# Patient Record
Sex: MALE | Race: WHITE | Employment: OTHER | URBAN - METROPOLITAN AREA
[De-identification: names, ages, dates, MRNs, and addresses within clinical notes are randomized per-mention and may not be internally consistent; named-entity substitution may affect disease eponyms.]

---

## 2017-06-27 PROBLEM — Z95.0 CARDIAC PACEMAKER IN SITU: Status: ACTIVE | Noted: 2017-06-27

## 2018-05-08 ENCOUNTER — TELEPHONE (OUTPATIENT)
Dept: CARDIOLOGY CLINIC | Age: 83
End: 2018-05-08

## 2018-05-21 ENCOUNTER — HOSPITAL ENCOUNTER (OUTPATIENT)
Age: 83
Discharge: HOME | End: 2018-05-23

## 2018-05-21 PROCEDURE — 88305 TISSUE EXAM BY PATHOLOGIST: CPT

## 2018-05-24 ENCOUNTER — NURSE ONLY (OUTPATIENT)
Dept: NON INVASIVE DIAGNOSTICS | Age: 83
End: 2018-05-24
Payer: MEDICARE

## 2018-05-24 DIAGNOSIS — Z95.0 CARDIAC PACEMAKER IN SITU: Primary | ICD-10-CM

## 2018-05-24 PROCEDURE — 93296 REM INTERROG EVL PM/IDS: CPT | Performed by: INTERNAL MEDICINE

## 2018-05-24 PROCEDURE — 93294 REM INTERROG EVL PM/LDLS PM: CPT | Performed by: INTERNAL MEDICINE

## 2018-06-01 ENCOUNTER — TELEPHONE (OUTPATIENT)
Dept: CARDIOLOGY CLINIC | Age: 83
End: 2018-06-01

## 2018-08-15 ENCOUNTER — HOSPITAL ENCOUNTER (EMERGENCY)
Age: 83
Discharge: HOME | End: 2018-08-15
Payer: MEDICARE

## 2018-08-15 VITALS
TEMPERATURE: 97.6 F | HEIGHT: 73 IN | SYSTOLIC BLOOD PRESSURE: 128 MMHG | WEIGHT: 195 LBS | DIASTOLIC BLOOD PRESSURE: 71 MMHG | BODY MASS INDEX: 25.84 KG/M2 | OXYGEN SATURATION: 98 % | RESPIRATION RATE: 18 BRPM | HEART RATE: 72 BPM

## 2018-08-15 DIAGNOSIS — L23.7 ALLERGIC CONTACT DERMATITIS DUE TO PLANT: Primary | ICD-10-CM

## 2018-08-15 PROCEDURE — 96372 THER/PROPH/DIAG INJ SC/IM: CPT

## 2018-08-15 PROCEDURE — 6370000000 HC RX 637 (ALT 250 FOR IP): Performed by: NURSE PRACTITIONER

## 2018-08-15 PROCEDURE — 99282 EMERGENCY DEPT VISIT SF MDM: CPT

## 2018-08-15 PROCEDURE — 6360000002 HC RX W HCPCS: Performed by: NURSE PRACTITIONER

## 2018-08-15 RX ORDER — METHYLPREDNISOLONE 4 MG/1
TABLET ORAL
Qty: 1 KIT | Status: SHIPPED | OUTPATIENT
Start: 2018-08-15 | End: 2018-08-21

## 2018-08-15 RX ORDER — LORATADINE 10 MG/1
10 TABLET ORAL DAILY
Qty: 10 TABLET | Refills: 0 | Status: SHIPPED | OUTPATIENT
Start: 2018-08-15 | End: 2018-08-25

## 2018-08-15 RX ORDER — FAMOTIDINE 20 MG/1
20 TABLET, FILM COATED ORAL ONCE
Status: COMPLETED | OUTPATIENT
Start: 2018-08-15 | End: 2018-08-15

## 2018-08-15 RX ORDER — METHYLPREDNISOLONE SODIUM SUCCINATE 125 MG/2ML
125 INJECTION, POWDER, LYOPHILIZED, FOR SOLUTION INTRAMUSCULAR; INTRAVENOUS ONCE
Status: COMPLETED | OUTPATIENT
Start: 2018-08-15 | End: 2018-08-15

## 2018-08-15 RX ADMIN — FAMOTIDINE 20 MG: 20 TABLET ORAL at 17:47

## 2018-08-15 RX ADMIN — METHYLPREDNISOLONE SODIUM SUCCINATE 125 MG: 125 INJECTION, POWDER, FOR SOLUTION INTRAMUSCULAR; INTRAVENOUS at 17:48

## 2018-08-15 NOTE — ED PROVIDER NOTES
shortness of breath, change in voice, perineal itching, metallic taste, feeling of sudden doom, light-headedness, loss of consciousness or additional symptoms. ROS   Pertinent positives and negatives are stated within HPI, all other systems reviewed and are negative. Past Surgical History:  has a past surgical history that includes Fixation Kyphoplasty (07/11/12); ECHO Compl W Dop Color Flow (7/26/2012); Prostate biopsy (9/6/12); fracture surgery (1975); Colonoscopy; ECHO Transesophageal (12/14/2012); ECHO Transesophageal (12/28/2012); transesophageal echocardiogram (12/28/2012); Aortic valve replacement (12-); Cardioversion (12/23/2013); ECHO Transesophageal (12/23/2013); Cardiac catheterization (11/25/2013); A-V cardiac pacemaker insertion (Left, 01/28/2014); joint replacement (Bilateral, 2002 AND 2003); other surgical history (12/18/15); and Varicose vein surgery. Social History:  reports that he quit smoking about 58 years ago. His smoking use included Cigarettes. He has a 0.60 pack-year smoking history. He has never used smokeless tobacco. He reports that he drinks about 3.5 oz of alcohol per week . He reports that he does not use drugs. Family History: family history includes COPD in his mother. Allergies: Patient has no known allergies. Physical Exam           ED Triage Vitals [08/15/18 1730]   BP Temp Temp Source Pulse Resp SpO2 Height Weight   128/71 97.6 °F (36.4 °C) Oral 72 18 98 % 6' 1\" (1.854 m) 195 lb (88.5 kg)      Oxygen Saturation Interpretation: Normal.    General Appearance/Constitutional:  Alert, development consistent with age. HEENT:  NC/NT. PERRLA. Airway patent. Neck:  Normal ROM. Supple. Respiratory:  Clear to auscultation and breath sounds equal.  CV:  Regular rate and rhythm, normal heart sounds, without pathological murmurs, ectopy, gallops, or rubs. GI:  Abdomen Soft, nontender, good bowel sounds. No firm or pulsatile mass.   Back:  No costovertebral tenderness. Extremities: No tenderness or edema noted. Integument:  Normal turgor. Warm, dry, erythematous raised welts noted on right antecubital, right abdomen and left shoulder region. Lymphatics: No lymphangitis or adenopathy noted. Neurological:  Oriented. Motor functions intact. Physical Exam   Skin:          Lab / Imaging Results     (All laboratory and radiology results have been personally reviewed by myself)  Labs:  No results found for this visit on 08/15/18. Imaging: All Radiology results interpreted by Radiologist unless otherwise noted. No orders to display       ED Course / Medical Decision Making     Medications   methylPREDNISolone sodium (SOLU-MEDROL) injection 125 mg (125 mg Intramuscular Given 8/15/18 1748)   famotidine (PEPCID) tablet 20 mg (20 mg Oral Given 8/15/18 1747)        Re-examination:  8/15/18       Time: 1750  Patients symptoms are improving. Consult(s):   None    Procedure(s):   none    MDM:   Allergic contact dermatitis due to unknown plant. Patient has no respiratory involvement and denies any chest pain, shortness of breath, wheezing or any other symptoms other than localized itching. Symptoms improving and will give a Medrol Dosepak with Claritin for home maintenance to prevent rebound reaction. Discussed importance of follow up with PCP for re evaluation. Counseling: The emergency provider has spoken with the patient and discussed todays results, in addition to providing specific details for the plan of care and counseling regarding the diagnosis and prognosis. Questions are answered at this time and they are agreeable with the plan. Assessment      1. Allergic contact dermatitis due to plant      Plan   Discharge to home  Patient condition is good    New Medications     New Prescriptions    LORATADINE (CLARITIN) 10 MG TABLET    Take 1 tablet by mouth daily for 10 days    METHYLPREDNISOLONE (MEDROL, PAYAM,) 4 MG TABLET    Take by mouth. Electronically signed by CROW Rdz CNP   DD: 8/15/18  **This report was transcribed using voice recognition software. Every effort was made to ensure accuracy; however, inadvertent computerized transcription errors may be present.   END OF ED PROVIDER NOTE      CROW Rdz CNP  08/16/18 7851

## 2018-08-23 ENCOUNTER — NURSE ONLY (OUTPATIENT)
Dept: NON INVASIVE DIAGNOSTICS | Age: 83
End: 2018-08-23
Payer: MEDICARE

## 2018-08-23 DIAGNOSIS — Z95.0 CARDIAC PACEMAKER IN SITU: Primary | ICD-10-CM

## 2018-08-23 PROCEDURE — 93296 REM INTERROG EVL PM/IDS: CPT | Performed by: INTERNAL MEDICINE

## 2018-08-23 PROCEDURE — 93294 REM INTERROG EVL PM/LDLS PM: CPT | Performed by: INTERNAL MEDICINE

## 2018-11-08 ENCOUNTER — HOSPITAL ENCOUNTER (OUTPATIENT)
Age: 83
Discharge: HOME | End: 2018-11-10
Payer: MEDICARE

## 2018-11-08 ENCOUNTER — HOSPITAL ENCOUNTER (OUTPATIENT)
Dept: GENERAL RADIOLOGY | Age: 83
Discharge: HOME | End: 2018-11-10
Payer: MEDICARE

## 2018-11-08 DIAGNOSIS — M25.511 RIGHT SHOULDER PAIN, UNSPECIFIED CHRONICITY: ICD-10-CM

## 2018-11-08 PROCEDURE — 73030 X-RAY EXAM OF SHOULDER: CPT

## 2018-11-23 ENCOUNTER — NURSE ONLY (OUTPATIENT)
Dept: NON INVASIVE DIAGNOSTICS | Age: 83
End: 2018-11-23
Payer: MEDICARE

## 2018-11-23 DIAGNOSIS — Z95.0 CARDIAC PACEMAKER IN SITU: Primary | ICD-10-CM

## 2018-11-23 PROCEDURE — 93294 REM INTERROG EVL PM/LDLS PM: CPT | Performed by: INTERNAL MEDICINE

## 2018-11-23 PROCEDURE — 93296 REM INTERROG EVL PM/IDS: CPT | Performed by: INTERNAL MEDICINE

## 2019-01-23 ENCOUNTER — HOSPITAL ENCOUNTER (OUTPATIENT)
Age: 84
Discharge: HOME | End: 2019-01-25
Payer: MEDICARE

## 2019-01-23 ENCOUNTER — HOSPITAL ENCOUNTER (OUTPATIENT)
Dept: GENERAL RADIOLOGY | Age: 84
Discharge: HOME | End: 2019-01-25
Payer: MEDICARE

## 2019-01-23 DIAGNOSIS — M25.561 ACUTE PAIN OF RIGHT KNEE: ICD-10-CM

## 2019-01-23 PROCEDURE — 73564 X-RAY EXAM KNEE 4 OR MORE: CPT

## 2019-02-21 ENCOUNTER — OFFICE VISIT (OUTPATIENT)
Dept: NON INVASIVE DIAGNOSTICS | Age: 84
End: 2019-02-21
Payer: MEDICARE

## 2019-02-21 VITALS
BODY MASS INDEX: 28.63 KG/M2 | HEART RATE: 72 BPM | HEIGHT: 73 IN | RESPIRATION RATE: 16 BRPM | WEIGHT: 216 LBS | SYSTOLIC BLOOD PRESSURE: 130 MMHG | DIASTOLIC BLOOD PRESSURE: 78 MMHG

## 2019-02-21 DIAGNOSIS — I49.5 SSS (SICK SINUS SYNDROME) (HCC): Chronic | ICD-10-CM

## 2019-02-21 DIAGNOSIS — Z95.0 CARDIAC PACEMAKER IN SITU: Primary | ICD-10-CM

## 2019-02-21 PROCEDURE — 99214 OFFICE O/P EST MOD 30 MIN: CPT | Performed by: INTERNAL MEDICINE

## 2019-02-21 PROCEDURE — 93280 PM DEVICE PROGR EVAL DUAL: CPT | Performed by: INTERNAL MEDICINE

## 2019-02-21 RX ORDER — OLMESARTAN MEDOXOMIL 20 MG/1
TABLET ORAL
COMMUNITY
Start: 2010-08-04 | End: 2019-02-21 | Stop reason: SDUPTHER

## 2019-02-21 RX ORDER — CETIRIZINE HYDROCHLORIDE 10 MG/1
10 TABLET ORAL DAILY
COMMUNITY
End: 2021-03-08 | Stop reason: ALTCHOICE

## 2019-03-08 ENCOUNTER — HOSPITAL ENCOUNTER (OUTPATIENT)
Dept: CT IMAGING | Age: 84
Discharge: HOME | End: 2019-03-10
Payer: MEDICARE

## 2019-03-08 ENCOUNTER — HOSPITAL ENCOUNTER (OUTPATIENT)
Age: 84
Discharge: HOME | End: 2019-03-10
Payer: MEDICARE

## 2019-03-08 DIAGNOSIS — J32.9 CHRONIC SINUSITIS, UNSPECIFIED LOCATION: ICD-10-CM

## 2019-03-08 PROCEDURE — 70486 CT MAXILLOFACIAL W/O DYE: CPT

## 2019-05-28 ENCOUNTER — NURSE ONLY (OUTPATIENT)
Dept: NON INVASIVE DIAGNOSTICS | Age: 84
End: 2019-05-28
Payer: MEDICARE

## 2019-05-28 DIAGNOSIS — Z95.0 CARDIAC PACEMAKER IN SITU: Primary | ICD-10-CM

## 2019-05-28 DIAGNOSIS — I48.0 PAROXYSMAL ATRIAL FIBRILLATION (HCC): ICD-10-CM

## 2019-05-28 PROCEDURE — 93294 REM INTERROG EVL PM/LDLS PM: CPT | Performed by: INTERNAL MEDICINE

## 2019-05-28 PROCEDURE — 93296 REM INTERROG EVL PM/IDS: CPT | Performed by: INTERNAL MEDICINE

## 2019-08-28 ENCOUNTER — NURSE ONLY (OUTPATIENT)
Dept: NON INVASIVE DIAGNOSTICS | Age: 84
End: 2019-08-28
Payer: MEDICARE

## 2019-08-28 DIAGNOSIS — Z95.0 CARDIAC PACEMAKER IN SITU: Primary | ICD-10-CM

## 2019-08-28 DIAGNOSIS — I48.0 PAROXYSMAL ATRIAL FIBRILLATION (HCC): ICD-10-CM

## 2019-08-28 PROCEDURE — 93294 REM INTERROG EVL PM/LDLS PM: CPT | Performed by: INTERNAL MEDICINE

## 2019-08-28 PROCEDURE — 93296 REM INTERROG EVL PM/IDS: CPT | Performed by: INTERNAL MEDICINE

## 2019-09-03 NOTE — PROGRESS NOTES
See [facility] [place] report. Remote monitoring reviewed over a 90 day period.   End of 90 day monitoring period date of service 08/28/19

## 2019-12-06 ENCOUNTER — TELEPHONE (OUTPATIENT)
Dept: NON INVASIVE DIAGNOSTICS | Age: 84
End: 2019-12-06

## 2020-02-28 ENCOUNTER — NURSE ONLY (OUTPATIENT)
Dept: NON INVASIVE DIAGNOSTICS | Age: 85
End: 2020-02-28
Payer: MEDICARE

## 2020-02-28 PROCEDURE — 93294 REM INTERROG EVL PM/LDLS PM: CPT | Performed by: INTERNAL MEDICINE

## 2020-02-28 PROCEDURE — 93296 REM INTERROG EVL PM/IDS: CPT | Performed by: INTERNAL MEDICINE

## 2020-03-03 NOTE — PROGRESS NOTES
See [facility] [place] report. Remote monitoring reviewed over a 90 day period.   End of 90 day monitoring period date of service 02/29/2020

## 2020-03-05 ENCOUNTER — OFFICE VISIT (OUTPATIENT)
Dept: CARDIOLOGY CLINIC | Age: 85
End: 2020-03-05
Payer: MEDICARE

## 2020-03-05 VITALS
DIASTOLIC BLOOD PRESSURE: 60 MMHG | HEIGHT: 72 IN | RESPIRATION RATE: 16 BRPM | HEART RATE: 66 BPM | BODY MASS INDEX: 28.63 KG/M2 | SYSTOLIC BLOOD PRESSURE: 116 MMHG | WEIGHT: 211.4 LBS

## 2020-03-05 PROCEDURE — 93000 ELECTROCARDIOGRAM COMPLETE: CPT | Performed by: INTERNAL MEDICINE

## 2020-03-05 PROCEDURE — 99213 OFFICE O/P EST LOW 20 MIN: CPT | Performed by: INTERNAL MEDICINE

## 2020-03-05 NOTE — PROGRESS NOTES
Highest education level: Not on file   Occupational History    Not on file   Social Needs    Financial resource strain: Not on file    Food insecurity:     Worry: Not on file     Inability: Not on file    Transportation needs:     Medical: Not on file     Non-medical: Not on file   Tobacco Use    Smoking status: Former Smoker     Packs/day: 0.30     Years: 2.00     Pack years: 0.60     Types: Cigarettes     Last attempt to quit: 1960     Years since quittin.2    Smokeless tobacco: Never Used   Substance and Sexual Activity    Alcohol use: Yes     Alcohol/week: 5.8 standard drinks     Comment: 3 drinks a week    Drug use: No    Sexual activity: Not on file   Lifestyle    Physical activity:     Days per week: Not on file     Minutes per session: Not on file    Stress: Not on file   Relationships    Social connections:     Talks on phone: Not on file     Gets together: Not on file     Attends Restorationism service: Not on file     Active member of club or organization: Not on file     Attends meetings of clubs or organizations: Not on file     Relationship status: Not on file    Intimate partner violence:     Fear of current or ex partner: Not on file     Emotionally abused: Not on file     Physically abused: Not on file     Forced sexual activity: Not on file   Other Topics Concern    Not on file   Social History Narrative    ** Merged History Encounter **            Allergies: Allergies   Allergen Reactions    Pollen Extract      Other reaction(s):  Other: See Comments  sneezing       Current Medications:  Current Outpatient Medications   Medication Sig Dispense Refill    cetirizine (ZYRTEC) 10 MG tablet Take 10 mg by mouth daily      levothyroxine (SYNTHROID) 25 MCG tablet Take 25 mcg by mouth daily      PRIMIDONE PO Take 100 mg by mouth 2 times daily   5    warfarin (COUMADIN) 6 MG tablet Take 6 mg by mouth [facility] manages pts coumadin      metoprolol (TOPROL-XL) 25 MG XL tablet Take 1 tablet by mouth 2 times daily. 60 tablet 3    magnesium oxide (MAG-OX) 400 (240 MG) MG tablet Take 1 tablet by mouth daily. (Patient taking differently: Take 250 mg by mouth daily ) 30 tablet 3    olmesartan (BENICAR) 20 MG tablet Take 1 tablet by mouth daily. 30 tablet 3     No current facility-administered medications for this visit. Physical Exam:  /60   Resp 16   Ht 6' (1.829 m)   Wt 211 lb 6.4 oz (95.9 kg)   BMI 28.67 kg/m²   Wt Readings from Last 3 Encounters:   03/05/20 211 lb 6.4 oz (95.9 kg)   02/21/19 216 lb (98 kg)   08/15/18 195 lb (88.5 kg)     Appearance: Awake, alert and oriented x 3, no acute respiratory distress  Skin: Intact, no rash  Head: Normocephalic, atraumatic  Eyes: EOMI, no conjunctival erythema  ENMT: No pharyngeal erythema, MMM, no rhinorrhea  Neck: Supple, no elevated JVP, no carotid bruits  Lungs: Clear to auscultation bilaterally. No wheezes, rales, or rhonchi.   Cardiac: Regular rate and rhythm, +S1S2, ESM 2/6 over the right upper sternal border   Abdomen: Soft, nontender, +bowel sounds  Extremities: Moves all extremities x 4, no lower extremity edema  Neurologic: No focal motor deficits apparent, normal mood and affect, alert and oriented x 3  Peripheral Pulses: Intact posterior tibial pulses bilaterally    Laboratory Tests:  Lab Results   Component Value Date    CREATININE 0.8 12/18/2015    BUN 13 12/18/2015     12/18/2015    K 4.3 12/18/2015    CL 98 12/18/2015    CO2 30 (H) 12/18/2015     Lab Results   Component Value Date    MG 2.1 09/25/2014     Lab Results   Component Value Date    WBC 4.7 12/18/2015    HGB 14.3 12/18/2015    HCT 43.3 12/18/2015    MCV 93.0 12/18/2015     12/18/2015     Lab Results   Component Value Date    ALT 14 08/24/2014    AST 38 08/24/2014    ALKPHOS 70 08/24/2014    BILITOT 0.3 08/24/2014     Lab Results   Component Value Date    CKTOTAL 43 12/16/2014    CKMB 1.4 12/16/2014    TROPONINI <0.01 12/16/2014    TROPONINI <0.01

## 2020-03-05 NOTE — PATIENT INSTRUCTIONS
· Continue current medications without any change. · Continue anticoagulation therapy with warfarin and rate control with metoprolol. · Follow up in one year and follow up with pcp for INR monitoring.

## 2020-05-29 ENCOUNTER — NURSE ONLY (OUTPATIENT)
Dept: NON INVASIVE DIAGNOSTICS | Age: 85
End: 2020-05-29
Payer: MEDICARE

## 2020-05-29 PROCEDURE — 93296 REM INTERROG EVL PM/IDS: CPT | Performed by: INTERNAL MEDICINE

## 2020-05-29 PROCEDURE — 93294 REM INTERROG EVL PM/LDLS PM: CPT | Performed by: INTERNAL MEDICINE

## 2020-08-14 ENCOUNTER — TELEPHONE (OUTPATIENT)
Dept: CARDIOLOGY CLINIC | Age: 85
End: 2020-08-14

## 2020-08-14 NOTE — TELEPHONE ENCOUNTER
Patient's wife called stating the neurologist wants patient to take Remeron for essential tremors. They would like to make sure this is okay with Dr. Boy Moore. Please advise.

## 2020-08-28 ENCOUNTER — NURSE ONLY (OUTPATIENT)
Dept: NON INVASIVE DIAGNOSTICS | Age: 85
End: 2020-08-28
Payer: MEDICARE

## 2020-08-28 PROCEDURE — 93296 REM INTERROG EVL PM/IDS: CPT | Performed by: INTERNAL MEDICINE

## 2020-08-28 PROCEDURE — 93294 REM INTERROG EVL PM/LDLS PM: CPT | Performed by: INTERNAL MEDICINE

## 2020-08-31 NOTE — PROGRESS NOTES
See [facility] [place] report. Remote monitoring reviewed over a 90 day period.   End of 90 day monitoring period date of service 08/29/2020

## 2020-11-30 ENCOUNTER — NURSE ONLY (OUTPATIENT)
Dept: NON INVASIVE DIAGNOSTICS | Age: 85
End: 2020-11-30
Payer: MEDICARE

## 2020-11-30 PROCEDURE — 93294 REM INTERROG EVL PM/LDLS PM: CPT | Performed by: INTERNAL MEDICINE

## 2020-11-30 PROCEDURE — 93296 REM INTERROG EVL PM/IDS: CPT | Performed by: INTERNAL MEDICINE

## 2020-12-01 NOTE — PROGRESS NOTES
See [facility] [place] report. Remote monitoring reviewed over a 90 day period.   End of 90 day monitoring period date of service 11/29/2020

## 2021-02-04 ENCOUNTER — TELEPHONE (OUTPATIENT)
Dept: ADMINISTRATIVE | Age: 86
End: 2021-02-04

## 2021-03-01 ENCOUNTER — NURSE ONLY (OUTPATIENT)
Dept: NON INVASIVE DIAGNOSTICS | Age: 86
End: 2021-03-01
Payer: MEDICARE

## 2021-03-01 DIAGNOSIS — I48.0 PAROXYSMAL ATRIAL FIBRILLATION (HCC): ICD-10-CM

## 2021-03-01 DIAGNOSIS — I49.5 SSS (SICK SINUS SYNDROME) (HCC): ICD-10-CM

## 2021-03-01 DIAGNOSIS — Z95.0 CARDIAC PACEMAKER IN SITU: Primary | ICD-10-CM

## 2021-03-01 PROCEDURE — 93294 REM INTERROG EVL PM/LDLS PM: CPT | Performed by: INTERNAL MEDICINE

## 2021-03-01 PROCEDURE — 93296 REM INTERROG EVL PM/IDS: CPT | Performed by: INTERNAL MEDICINE

## 2021-03-08 ENCOUNTER — OFFICE VISIT (OUTPATIENT)
Dept: CARDIOLOGY CLINIC | Age: 86
End: 2021-03-08
Payer: MEDICARE

## 2021-03-08 VITALS
HEIGHT: 72 IN | WEIGHT: 216 LBS | BODY MASS INDEX: 29.26 KG/M2 | HEART RATE: 66 BPM | SYSTOLIC BLOOD PRESSURE: 122 MMHG | DIASTOLIC BLOOD PRESSURE: 78 MMHG

## 2021-03-08 DIAGNOSIS — R60.0 BILATERAL EDEMA OF LOWER EXTREMITY: ICD-10-CM

## 2021-03-08 DIAGNOSIS — I48.0 PAROXYSMAL ATRIAL FIBRILLATION (HCC): Primary | Chronic | ICD-10-CM

## 2021-03-08 DIAGNOSIS — R06.09 DOE (DYSPNEA ON EXERTION): ICD-10-CM

## 2021-03-08 DIAGNOSIS — Z95.2 S/P AVR: ICD-10-CM

## 2021-03-08 PROCEDURE — 93000 ELECTROCARDIOGRAM COMPLETE: CPT | Performed by: INTERNAL MEDICINE

## 2021-03-08 PROCEDURE — 99214 OFFICE O/P EST MOD 30 MIN: CPT | Performed by: INTERNAL MEDICINE

## 2021-03-08 RX ORDER — MIRTAZAPINE 15 MG/1
15 TABLET, FILM COATED ORAL NIGHTLY
COMMUNITY
Start: 2021-02-03

## 2021-03-08 RX ORDER — CANDESARTAN 16 MG/1
8 TABLET ORAL DAILY
COMMUNITY
Start: 2020-12-07

## 2021-03-08 RX ORDER — GABAPENTIN 100 MG/1
300 CAPSULE ORAL 3 TIMES DAILY
COMMUNITY
Start: 2021-01-04

## 2021-03-08 NOTE — PROGRESS NOTES
OUTPATIENT CARDIOLOGY FOLLOW-UP    Name: Mackenzie Ansari    Age: 81 y.o. Primary Care Physician: Noralyn Litten, DO    Date of Service: 3/8/2021    Chief Complaint:   Chief Complaint   Patient presents with    Atrial Fibrillation    1 Year Follow Up       Interim History:   Mr. Emmanuel Snyder is a 67-year-old gentleman with history of severe aortic stenosis underwent bioprosthetic AVR on 12/10/2013, also had a maze and left atrial clipping, hypertension and hypothyroidism on hormonal replacement therapy. He had a symptomatic sick sinus syndrome with a history of tachybradycardia syndrome and received Medtronic dual-chamber pacemaker, history of proximal atrial fibrillation with rapid ventricular response and a bilateral pulmonary emboli in July 2012, history of L1 fracture secondary to fall status post kyphoplasty is here for follow-up visit. He was last seen in the office was in to 3/5/2020. Since his last visit, he has not had any ER visits or hospitalizations. He is having essential tremors and following with a neurologist at [facility]. He was on primidone for tremors which was discontinued. Currently he is taking Remeron 15 mg a day. No new cardiac complaints since last cardiology evaluation. He denies recent chest pain, SOB, palpitations, lightheadedness, dizziness, syncope, PND, or orthopnea. He is following with his primary care provider for INR monitoring and denies any bleeding complications including blood or black stools. SR on EKG.     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, TEMPORAY,HAD 2 EPISODES LASTED 10-15 SECONDS    Cancer ([address] 2013    skin    COPD (chronic obstructive pulmonary disease) ([address]     Emphysema of lung ([address]     Environmental allergies     POLLEN    Fractured pelvis ([address] 1975    and fractured wrist after fall from tree    Frequency of urination     Hx of blood clots 07/2012    after kyphoplasty,PE    Hypertension     pt wife states BP is controlled    PE (pulmonary embolism)     Pulmonary embolism ([address] 07/2012    after kyphoplasty    Right leg DVT (HCC)     SSS (sick sinus syndrome) ([address]     Thyroid disease     Wears dentures     PARTIAL UPPER       Past Surgical History:  Past Surgical History:   Procedure Laterality Date    A-V CARDIAC PACEMAKER INSERTION Left 01/28/2014    AORTIC VALVE REPLACEMENT  12-    bioprosthesis for severe AS    CARDIAC CATHETERIZATION  11/25/2013    CARDIOVERSION  12/23/2013    Dr Herminia Hernandez FLOW  7/26/2012         ECHOCARDIOGRAM TRANSESOPHAGEAL  12/14/2012         ECHOCARDIOGRAM TRANSESOPHAGEAL  12/28/2012         ECHOCARDIOGRAM TRANSESOPHAGEAL  12/23/2013         FIXATION KYPHOPLASTY  07/11/12    Kyphoplasty L1-L2, Bone BX, Insertion Epidural Cath with Steroid Injection    FRACTURE SURGERY  1975    wrist    JOINT REPLACEMENT Bilateral 2002 AND 2003    bilateral knee replacement    OTHER SURGICAL HISTORY  12/18/15    radiofrequency ablation r leg    PROSTATE BIOPSY  9/6/12    TRANSESOPHAGEAL ECHOCARDIOGRAM  12/28/2012    per Dr. Pat Mina. ..unable to cardiovert d/t clot in atrium    VARICOSE VEIN SURGERY         Family History:  Family History   Problem Relation Age of Onset    COPD Mother        Social History:  Social History     Socioeconomic History    Marital status:      Spouse name: Not on file    Number of children: Not on file    Years of education: Not on file    Highest education level: Not on file   Occupational History    Not on file   Social Needs    Financial resource strain: Not on file    Food insecurity     Worry: Not on file     Inability: Not on file    Transportation needs     Medical: Not on file     Non-medical: Not on file   Tobacco Use    Smoking status: Former Smoker     Packs/day: 0.30     Years: 2.00     Pack years: 0.60     Types: Cigarettes     Quit date: 1960     Years since quittin.2    Smokeless tobacco: Never Used   Substance and Sexual Activity    Alcohol use: Yes     Alcohol/week: 5.8 standard drinks     Comment: 3 drinks a week    Drug use: No    Sexual activity: Not on file   Lifestyle    Physical activity     Days per week: Not on file     Minutes per session: Not on file    Stress: Not on file   Relationships    Social connections     Talks on phone: Not on file     Gets together: Not on file     Attends Adventism service: Not on file     Active member of club or organization: Not on file     Attends meetings of clubs or organizations: Not on file     Relationship status: Not on file    Intimate partner violence     Fear of current or ex partner: Not on file     Emotionally abused: Not on file     Physically abused: Not on file     Forced sexual activity: Not on file   Other Topics Concern    Not on file   Social History Narrative    ** Merged History Encounter **            Allergies: Allergies   Allergen Reactions    Pollen Extract      Other reaction(s): Other: See Comments  sneezing       Current Medications:  Current Outpatient Medications   Medication Sig Dispense Refill    mirtazapine (REMERON) 15 MG tablet Start with half tab at night for 1 week, may increase to 1 tab at night.  gabapentin (NEURONTIN) 100 MG capsule 300 mg 2 times daily.        candesartan (ATACAND) 16 MG tablet       levothyroxine (SYNTHROID) 25 MCG tablet Take 25 mcg by mouth daily      warfarin (COUMADIN) 6 MG tablet Take 3 mg by mouth [facility] manages pts coumadin      metoprolol (TOPROL-XL) 25 MG XL tablet Take 1 tablet by mouth 2 times daily. 60 tablet 3    magnesium oxide (MAG-OX) 400 (240 MG) MG tablet Take 1 tablet by mouth daily. (Patient taking differently: Take 250 mg by mouth daily ) 30 tablet 3    cetirizine (ZYRTEC) 10 MG tablet Take 10 mg by mouth daily      PRIMIDONE PO Take 100 mg by mouth 2 times daily   5    olmesartan (BENICAR) 20 MG tablet Take 1 tablet by mouth daily. (Patient not taking: Reported on 3/8/2021) 30 tablet 3     No current facility-administered medications for this visit. Physical Exam:  /78   Pulse 66   Ht 6' (1.829 m)   Wt 216 lb (98 kg)   BMI 29.29 kg/m²   Wt Readings from Last 3 Encounters:   03/08/21 216 lb (98 kg)   03/05/20 211 lb 6.4 oz (95.9 kg)   02/21/19 216 lb (98 kg)     Appearance: Awake, alert and oriented x 3, no acute respiratory distress  Skin: Intact, no rash  Head: Normocephalic, atraumatic  Eyes: EOMI, no conjunctival erythema  ENMT: No pharyngeal erythema, MMM, no rhinorrhea  Neck: Supple, no elevated JVP, no carotid bruits  Lungs: Clear to auscultation bilaterally. No wheezes, rales, or rhonchi. Cardiac: Regular rate and rhythm, +S1S2, ESM 2/6 over the right upper sternal border, midsystolic murmur 3/6 heard over the left lower sternal border that increases with inspiration.   Abdomen: Soft, nontender, +bowel sounds  Extremities: Moves all extremities x 4, 2+lower extremity edema  Neurologic: No focal motor deficits apparent, normal mood and affect, alert and oriented x 3  Peripheral Pulses: Intact posterior tibial pulses bilaterally    Laboratory Tests:  Lab Results   Component Value Date    CREATININE 0.8 12/18/2015    BUN 13 12/18/2015     12/18/2015    K 4.3 12/18/2015    CL 98 12/18/2015    CO2 30 (H) 12/18/2015     Lab Results   Component Value Date    MG 2.1 09/25/2014     Lab Results   Component Value Date    WBC 4.7 12/18/2015    HGB 14.3 12/18/2015    HCT 43.3 12/18/2015    MCV 93.0 12/18/2015     12/18/2015     Lab Results   Component Value Date    ALT 14 08/24/2014    AST 38 08/24/2014    ALKPHOS 70 08/24/2014    BILITOT 0.3 08/24/2014     Lab Results   Component Value Date    CKTOTAL 43 12/16/2014    CKMB 1.4 12/16/2014    TROPONINI <0.01 12/16/2014    TROPONINI <0.01 02/01/2014    TROPONINI 0.03 12/22/2013     Lab Results   Component Value Date    INR 1.4 12/18/2015    INR 2.3 12/16/2014    INR 2.8 09/25/2014    PROTIME 15.3 (H) 12/18/2015    PROTIME 25.7 (H) 12/16/2014    PROTIME 30.5 (H) 09/25/2014     Lab Results   Component Value Date    TSH 3.830 12/16/2014     Lab Results   Component Value Date    LABA1C 5.9 12/09/2013     No results found for: EAG  Lab Results   Component Value Date    CHOL 189 08/25/2014     Lab Results   Component Value Date    TRIG 135 08/25/2014     Lab Results   Component Value Date    HDL 35 08/25/2014     Lab Results   Component Value Date    LDLCALC 127 (H) 08/25/2014     Lab Results   Component Value Date    LABVLDL 27 08/25/2014     No results found for: [facility]    Cardiac Tests:  ECG:Normal sinus rhythm, first-degree AV block, with intermittent ventricular pacing abnormal ECG. Echocardiogram: 3/31/2017   Normal left ventricle size and systolic function   Ejection fraction is visually estimated at 60%. [address] left ventricular concentric hypertrophy noted.   No wall motion abnormalities   The left atrium is severely dilated.   Severely enlarged right atrium.   Mild mitral regurgitation is present.   Well seated bio prosthetic aortic valve # 34   Pacer wire visualized in right ventricle.       Recent pacer evaluation done on 2/28/2020: Battery life 3 years, A pacing 70%, ventricular pacing 36%, mode switch 0%, AT AF burden 0%    The ASCVD Risk score (Cortez Bass., et al., 2013) failed to calculate for the following reasons:     The 2013 ASCVD risk score is only valid for ages 36 to 78        ASSESSMENT:  ·  Paroxysmal atrial fibrillation now in sinus rhythm, AF burden 0% since his last pacer evaluation. · Severity stenosis status post bioprosthetic AVR, MAZE procedure,  PRO clipping-2013  · Hypertension on Benicar, well controlled  · Sick sinus syndrome status post DDDR, [facility] pacer implanted on 1/28/2014  · Hypothyroidism on hormone replacement therapy  · Benign essential tremors  · Bilateral lower extremity edema and exertional dyspnea rule out pulmonary hypertension and prosthetic valve dysfunction. Plan:   · We will schedule him for an echocardiogram to assess bioprosthetic aortic valve function and to rule out prosthetic valve dysfunction and also to evaluate pulmonary hypertension. · Continue current medications without any change. · Continue anticoagulation therapy with warfarin and rate control with metoprolol. · Follow up in one year and follow up with pcp for INR monitoring. · Follow-up with me in 3 months     The patient's current medication list, allergies, problem list and results of all previously ordered testing were reviewed at today's visit.     Teo Barrett MD  [facility]) Cardiology

## 2021-03-08 NOTE — PATIENT INSTRUCTIONS
· We will schedule him for an echocardiogram to assess bioprosthetic aortic valve and to rule out prosthetic valve dysfunction and also to evaluate pulmonary hypertension. · Continue current medications without any change. · Continue anticoagulation therapy with warfarin and rate control with metoprolol. · Follow up in one year and follow up with pcp for INR monitoring.    · Follow-up with me in 3 months

## 2021-03-30 ENCOUNTER — TELEPHONE (OUTPATIENT)
Dept: CARDIOLOGY | Age: 86
End: 2021-03-30

## 2021-03-30 NOTE — TELEPHONE ENCOUNTER
Scheduled echo with patients wife.   Electronically signed by Steff Burroughs on 3/30/2021 at 11:37 AM

## 2021-04-14 ENCOUNTER — TELEPHONE (OUTPATIENT)
Dept: CARDIOLOGY | Age: 86
End: 2021-04-14

## 2021-04-15 ENCOUNTER — HOSPITAL ENCOUNTER (OUTPATIENT)
Dept: CARDIOLOGY | Age: 86
Discharge: HOME | End: 2021-04-15
Payer: MEDICARE

## 2021-04-15 LAB
LV EF: 63 %
LVEF MODALITY: NORMAL

## 2021-04-15 PROCEDURE — 93306 TTE W/DOPPLER COMPLETE: CPT

## 2021-04-19 ENCOUNTER — TELEPHONE (OUTPATIENT)
Dept: CARDIOLOGY CLINIC | Age: 86
End: 2021-04-19

## 2021-04-19 NOTE — TELEPHONE ENCOUNTER
----- Message from Jan Sanchez MD sent at 4/19/2021  1:59 PM EDT -----  His echo showed normal LV function but mitral appears leaking and it appears severe. I would recommend REZA for further evaluation.

## 2021-04-27 ENCOUNTER — TELEPHONE (OUTPATIENT)
Dept: CARDIOLOGY CLINIC | Age: 86
End: 2021-04-27

## 2021-04-27 NOTE — TELEPHONE ENCOUNTER
COVID scheduled for 4/30/21 ([address] [place] [address]) for REZA 5/6/21 [facility]). Patient notified to self quarantine after testing until procedure, except for INR on 5/5/21. Patient states he understands and will comply.

## 2021-04-30 ENCOUNTER — HOSPITAL ENCOUNTER (OUTPATIENT)
Age: 86
Discharge: HOME | End: 2021-05-02
Payer: MEDICARE

## 2021-04-30 DIAGNOSIS — Z01.818 PREOP TESTING: ICD-10-CM

## 2021-04-30 PROCEDURE — U0005 INFEC AGEN DETEC AMPLI PROBE: HCPCS

## 2021-04-30 PROCEDURE — U0003 INFECTIOUS AGENT DETECTION BY NUCLEIC ACID (DNA OR RNA); SEVERE ACUTE RESPIRATORY SYNDROME CORONAVIRUS 2 (SARS-COV-2) (CORONAVIRUS DISEASE [COVID-19]), AMPLIFIED PROBE TECHNIQUE, MAKING USE OF HIGH THROUGHPUT TECHNOLOGIES AS DESCRIBED BY CMS-2020-01-R: HCPCS

## 2021-05-01 LAB
SARS-COV-2: NOT DETECTED
SOURCE: NORMAL

## 2021-05-05 ENCOUNTER — TELEPHONE (OUTPATIENT)
Dept: CARDIOLOGY CLINIC | Age: 86
End: 2021-05-05

## 2021-05-05 NOTE — TELEPHONE ENCOUNTER
INR today (2.7). Per verbal from Dr. Fareed Hyatt, okay for REZA tomorrow, continue current dose of Coumadin. Patient's wife notified.

## 2021-05-06 ENCOUNTER — ANESTHESIA (OUTPATIENT)
Dept: NON INVASIVE DIAGNOSTICS | Age: 86
End: 2021-05-06

## 2021-05-06 ENCOUNTER — ANESTHESIA EVENT (OUTPATIENT)
Dept: NON INVASIVE DIAGNOSTICS | Age: 86
End: 2021-05-06

## 2021-05-06 ENCOUNTER — HOSPITAL ENCOUNTER (OUTPATIENT)
Dept: NON INVASIVE DIAGNOSTICS | Age: 86
Discharge: HOME | End: 2021-05-06
Payer: MEDICARE

## 2021-05-06 VITALS
HEART RATE: 65 BPM | SYSTOLIC BLOOD PRESSURE: 140 MMHG | DIASTOLIC BLOOD PRESSURE: 77 MMHG | BODY MASS INDEX: 28.71 KG/M2 | WEIGHT: 212 LBS | HEIGHT: 72 IN | OXYGEN SATURATION: 96 % | RESPIRATION RATE: 18 BRPM | TEMPERATURE: 97.2 F

## 2021-05-06 VITALS
OXYGEN SATURATION: 96 % | SYSTOLIC BLOOD PRESSURE: 92 MMHG | RESPIRATION RATE: 30 BRPM | DIASTOLIC BLOOD PRESSURE: 50 MMHG

## 2021-05-06 DIAGNOSIS — Z01.818 PREOP TESTING: Primary | ICD-10-CM

## 2021-05-06 LAB
LV EF: 63 %
LVEF MODALITY: NORMAL

## 2021-05-06 PROCEDURE — 93312 ECHO TRANSESOPHAGEAL: CPT | Performed by: INTERNAL MEDICINE

## 2021-05-06 PROCEDURE — 6360000002 HC RX W HCPCS: Performed by: NURSE ANESTHETIST, CERTIFIED REGISTERED

## 2021-05-06 PROCEDURE — 93320 DOPPLER ECHO COMPLETE: CPT

## 2021-05-06 PROCEDURE — 7100000011 HC PHASE II RECOVERY - ADDTL 15 MIN

## 2021-05-06 PROCEDURE — 6360000002 HC RX W HCPCS

## 2021-05-06 PROCEDURE — 7100000010 HC PHASE II RECOVERY - FIRST 15 MIN

## 2021-05-06 PROCEDURE — 93320 DOPPLER ECHO COMPLETE: CPT | Performed by: INTERNAL MEDICINE

## 2021-05-06 PROCEDURE — 3700000001 HC ADD 15 MINUTES (ANESTHESIA)

## 2021-05-06 PROCEDURE — 93325 DOPPLER ECHO COLOR FLOW MAPG: CPT

## 2021-05-06 PROCEDURE — 93312 ECHO TRANSESOPHAGEAL: CPT

## 2021-05-06 PROCEDURE — 3700000000 HC ANESTHESIA ATTENDED CARE

## 2021-05-06 PROCEDURE — 93325 DOPPLER ECHO COLOR FLOW MAPG: CPT | Performed by: INTERNAL MEDICINE

## 2021-05-06 PROCEDURE — 2580000003 HC RX 258: Performed by: NURSE ANESTHETIST, CERTIFIED REGISTERED

## 2021-05-06 RX ORDER — SODIUM CHLORIDE 0.9 % (FLUSH) 0.9 %
5-40 SYRINGE (ML) INJECTION PRN
Status: DISCONTINUED | OUTPATIENT
Start: 2021-05-06 | End: 2021-05-07 | Stop reason: HOSPADM

## 2021-05-06 RX ORDER — PROPOFOL 10 MG/ML
INJECTION, EMULSION INTRAVENOUS PRN
Status: DISCONTINUED | OUTPATIENT
Start: 2021-05-06 | End: 2021-05-06 | Stop reason: SDUPTHER

## 2021-05-06 RX ORDER — SODIUM CHLORIDE 9 MG/ML
25 INJECTION, SOLUTION INTRAVENOUS PRN
Status: DISCONTINUED | OUTPATIENT
Start: 2021-05-06 | End: 2021-05-07 | Stop reason: HOSPADM

## 2021-05-06 RX ORDER — SODIUM CHLORIDE 9 MG/ML
INJECTION, SOLUTION INTRAVENOUS CONTINUOUS PRN
Status: DISCONTINUED | OUTPATIENT
Start: 2021-05-06 | End: 2021-05-06 | Stop reason: SDUPTHER

## 2021-05-06 RX ORDER — SODIUM CHLORIDE 0.9 % (FLUSH) 0.9 %
5-40 SYRINGE (ML) INJECTION EVERY 12 HOURS SCHEDULED
Status: DISCONTINUED | OUTPATIENT
Start: 2021-05-06 | End: 2021-05-07 | Stop reason: HOSPADM

## 2021-05-06 RX ADMIN — SODIUM CHLORIDE: 9 INJECTION, SOLUTION INTRAVENOUS at 07:50

## 2021-05-06 RX ADMIN — PROPOFOL 250 MG: 10 INJECTION, EMULSION INTRAVENOUS at 08:30

## 2021-05-06 ASSESSMENT — PAIN DESCRIPTION - ORIENTATION
ORIENTATION: INNER
ORIENTATION: INNER

## 2021-05-06 ASSESSMENT — PAIN DESCRIPTION - PROGRESSION
CLINICAL_PROGRESSION: NOT CHANGED
CLINICAL_PROGRESSION: NOT CHANGED

## 2021-05-06 ASSESSMENT — PAIN DESCRIPTION - LOCATION
LOCATION: THROAT

## 2021-05-06 ASSESSMENT — PAIN DESCRIPTION - PAIN TYPE
TYPE: ACUTE PAIN
TYPE: ACUTE PAIN

## 2021-05-06 ASSESSMENT — PAIN SCALES - GENERAL: PAINLEVEL_OUTOF10: 0

## 2021-05-06 ASSESSMENT — COPD QUESTIONNAIRES: CAT_SEVERITY: MODERATE

## 2021-05-06 ASSESSMENT — PAIN - FUNCTIONAL ASSESSMENT: PAIN_FUNCTIONAL_ASSESSMENT: 0-10

## 2021-05-06 NOTE — ANESTHESIA POSTPROCEDURE EVALUATION
Department of Anesthesiology  Postprocedure Note    Patient: Kendall Munson  MRN: [id number]  YOB: 1935  Date of evaluation: 5/6/2021  Time:  11:01 AM     Procedure Summary     Date: 05/06/21 Room / Location: [facility] Echocardiography    Anesthesia Start: 0806 Anesthesia Stop: 6487    Procedure: TRANSESOPHAGEAL ECHO Diagnosis: Paroxysmal atrial fibrillation    Scheduled Providers:  Responsible Provider: Taj Deras MD    Anesthesia Type: MAC ASA Status: 3          Anesthesia Type: MAC    Molly Phase I: Molly Score: 10    Molly Phase II: Molly Score: 10    Last vitals: Reviewed and per EMR flowsheets.        Anesthesia Post Evaluation    Patient location during evaluation: PACU  Patient participation: complete - patient participated  Level of consciousness: awake and alert  Airway patency: patent  Nausea & Vomiting: no vomiting and no nausea  Complications: no  Cardiovascular status: hemodynamically stable  Respiratory status: acceptable  Hydration status: stable

## 2021-05-06 NOTE — PROCEDURES
Preprocedure diagnosis: Mod to severe MR    Procedures: REZA    Prior tests anticoagulated    Primary procedure  Written informed consent was obtained, the REZA was performed under stable fasting condition. The patient was set up for monitoring of surface EKG and pulse oximetry continuously. Blood pressure was monitored and with automatic cuff measurements. Supplemental oxygen was administered. The procedure was performed under anesthesia by anesthesiology. After ensuring adequate anesthesia, REZA probe was intubated without difficulty. Result: Moderate MR. Complication: None    Patient was monitored until awake and vitals remained stable. Courtney Bailey M.D.   [id number] [facility] cardiology

## 2021-05-06 NOTE — ANESTHESIA PRE PROCEDURE
facility-administered medications for this encounter. 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 Jose Landon AUG 2014    BILATERAL, TEMPORAY,HAD 2 EPISODES LASTED 10-15 SECONDS / 2016 / no further episodes    Cancer ([address] 2013    skin    COPD (chronic obstructive pulmonary disease) (HCC)     Emphysema of lung (HCC)     mild    Environmental allergies     POLLEN    Essential tremor     head and hands    Fractured pelvis ([address] 1975    and fractured wrist after fall from tree    Frequency of urination     [place] (hard of hearing)     no aides    Hx of blood clots 07/2012    after kyphoplasty,PE    Hypertension     Mitral regurgitation     per echo 4/15/2021/ moderate to severe    Pulmonary embolism ([address] 07/2012    after kyphoplasty    Right leg DVT (HCC)     SSS (sick sinus syndrome) ([address]     pacer in place / [facility]    Thyroid disease     Wears dentures     PARTIAL UPPER       Past Surgical History:        Procedure Laterality Date    A-V CARDIAC PACEMAKER INSERTION Left 01/28/2014    AORTIC VALVE REPLACEMENT  12-    bioprosthesis for severe AS    CARDIAC CATHETERIZATION  11/25/2013    CARDIOVERSION  12/23/2013    Dr Gregg Perry REZA/DCCV    COLONOSCOPY      ECHO COMPL W DOP COLOR FLOW 2012         ECHOCARDIOGRAM TRANSESOPHAGEAL  2012         ECHOCARDIOGRAM TRANSESOPHAGEAL  2012         ECHOCARDIOGRAM TRANSESOPHAGEAL  2013         FIXATION KYPHOPLASTY  12    Kyphoplasty L1-L2, Bone BX, Insertion Epidural Cath with Steroid Injection    FRACTURE SURGERY  1975    wrist    JOINT REPLACEMENT Bilateral  AND     bilateral knee replacement    OTHER SURGICAL HISTORY  12/18/15    radiofrequency ablation r leg    PROSTATE BIOPSY  12    TRANSESOPHAGEAL ECHOCARDIOGRAM  2012    per Dr. Aan Rosa. ..unable to cardiovert d/t clot in atrium    VARICOSE VEIN SURGERY         Social History:    Social History     Tobacco Use    Smoking status: Former Smoker     Packs/day: 0.30     Years: 2.00     Pack years: 0.60     Types: Cigarettes     Quit date: 1960     Years since quittin.3    Smokeless tobacco: Never Used   Substance Use Topics    Alcohol use: Yes     Alcohol/week: 5.8 standard drinks     Comment: 3 drinks a week                                Counseling given: Not Answered      Vital Signs (Current):   Vitals:    21 0657   BP: (!) 144/79   Pulse: 66   Resp: 20   SpO2: 95%   Weight: 212 lb (96.2 kg)   Height: 6' (1.829 m)                                              BP Readings from Last 3 Encounters:   21 (!) 144/79   21 122/78   20 116/60       NPO Status: Time of last liquid consumption:                         Time of last solid consumption:                         Date of last liquid consumption: 21                        Date of last solid food consumption: 21    BMI:   Wt Readings from Last 3 Encounters:   21 212 lb (96.2 kg)   21 216 lb (98 kg)   21 216 lb (98 kg)     Body mass index is 28.75 kg/m².     CBC:   Lab Results   Component Value Date    WBC 4.7 2015    RBC 4.65 2015    HGB 14.3 2015    HCT 43.3 2015    MCV 93.0 2015    RDW 12.8 12/18/2015     12/18/2015       CMP:   Lab Results   Component Value Date     12/18/2015    K 4.3 12/18/2015    CL 98 12/18/2015    CO2 30 12/18/2015    BUN 13 12/18/2015    CREATININE 0.8 12/18/2015    GFRAA >60 12/18/2015    LABGLOM >60 12/18/2015    GLUCOSE 100 12/18/2015    PROT 6.9 08/24/2014    CALCIUM 9.2 12/18/2015    BILITOT 0.3 08/24/2014    ALKPHOS 70 08/24/2014    AST 38 08/24/2014    ALT 14 08/24/2014       POC Tests: No results for input(s): POCGLU, POCNA, POCK, POCCL, POCBUN, POCHEMO, POCHCT in the last 72 hours. Coags:   Lab Results   Component Value Date    PROTIME 15.3 12/18/2015    INR 1.4 12/18/2015    APTT 42.0 12/16/2014       HCG (If Applicable): No results found for: PREGTESTUR, PREGSERUM, HCG, HCGQUANT     ABGs:   Lab Results   Component Value Date    M8STDLAW 98.3 07/09/2012        Type & Screen (If Applicable):  No results found for: LABABO, LABRH    Drug/Infectious Status (If Applicable):  No results found for: HIV, HEPCAB    COVID-19 Screening (If Applicable):   Lab Results   Component Value Date    COVID19 Not Detected 04/30/2021           Anesthesia Evaluation  Patient summary reviewed  Airway: Mallampati: II  TM distance: >3 FB   Neck ROM: full  Mouth opening: > = 3 FB Dental:    (+) partials      Pulmonary: breath sounds clear to auscultation  (+) COPD: moderate,                            ROS comment: Former smoker   Cardiovascular:    (+) hypertension:, valvular problems/murmurs (S/P AVR): AS and MR, pacemaker:, dysrhythmias: atrial fibrillation,       ECG reviewed  Rhythm: regular  Rate: normal  Echocardiogram reviewed                  Neuro/Psych:   (+) TIA,              ROS comment: Blindness  Elem GI/Hepatic/Renal:        (-) liver disease and no renal disease       Endo/Other:    (+) hypothyroidism::., malignancy/cancer (skin). Abdominal:         (-) obese     Vascular:   + DVT, PE.                                      Anesthesia Plan      MAC ASA 3       Induction: intravenous. MIPS: Prophylactic antiemetics administered. Anesthetic plan and risks discussed with patient. Plan discussed with CRNA.                   Wild Mosquera MD   5/6/2021

## 2021-05-06 NOTE — H&P
head and hands    Fractured pelvis ([address] 1975    and fractured wrist after fall from tree    Frequency of urination     [place] (hard of hearing)     no aides    Hx of blood clots 07/2012    after kyphoplasty,PE    Hypertension     Mitral regurgitation     per echo 4/15/2021/ moderate to severe    Pulmonary embolism ([address] 07/2012    after kyphoplasty    Right leg DVT (HCC)     SSS (sick sinus syndrome) ([address]     pacer in place / [facility]    Thyroid disease     Wears dentures     PARTIAL UPPER       Past Surgical History:    Past Surgical History:   Procedure Laterality Date    A-V CARDIAC PACEMAKER INSERTION Left 01/28/2014    AORTIC VALVE REPLACEMENT  12-    bioprosthesis for severe AS    CARDIAC CATHETERIZATION  11/25/2013    CARDIOVERSION  12/23/2013    Dr Leavitt Nahunta FLOW  7/26/2012         ECHOCARDIOGRAM TRANSESOPHAGEAL  12/14/2012         ECHOCARDIOGRAM TRANSESOPHAGEAL  12/28/2012         ECHOCARDIOGRAM TRANSESOPHAGEAL  12/23/2013         FIXATION KYPHOPLASTY  07/11/12    Kyphoplasty L1-L2, Bone BX, Insertion Epidural Cath with Steroid Injection    FRACTURE SURGERY  1975    wrist    JOINT REPLACEMENT Bilateral 2002 AND 2003    bilateral knee replacement    OTHER SURGICAL HISTORY  12/18/15    radiofrequency ablation r leg    PROSTATE BIOPSY  9/6/12    TRANSESOPHAGEAL ECHOCARDIOGRAM  12/28/2012    per Dr. Rocio Aguirre. ..unable to cardiovert d/t clot in atrium    VARICOSE VEIN SURGERY         Medications Prior to admit:  Prior to Admission medications    Medication Sig Start Date End Date Taking? Authorizing Provider   mirtazapine (REMERON) 15 MG tablet Take 15 mg by mouth nightly  2/3/21  Yes Historical Provider, MD   gabapentin (NEURONTIN) 100 MG capsule 300 mg 3 times daily.   1/4/21  Yes Historical Provider, MD   candesartan (ATACAND) 16 MG tablet Take 16 mg by mouth daily  12/7/20  Yes Historical Provider, MD   levothyroxine (SYNTHROID) 25 MCG tablet Take 25 mcg by mouth nightly  5/10/16  Yes Historical Provider, MD   warfarin (COUMADIN) 6 MG tablet Take 3.5 mg by mouth daily [facility] pts coumadin   Yes Historical Provider, MD   metoprolol (TOPROL-XL) 25 MG XL tablet Take 1 tablet by mouth 2 times daily. 14  Yes Carry MD Kina   magnesium oxide (MAG-OX) 400 (240 MG) MG tablet Take 1 tablet by mouth daily. Patient taking differently: Take 250 mg by mouth daily  14   Carry MD Kina       Current Medications:    No current facility-administered medications for this encounter. Facility-Administered Medications Ordered in Other Encounters: 0.9 % sodium chloride infusion, , , Continuous PRN    Allergies:  Pollen extract    Social History:    Social History     Socioeconomic History    Marital status:      Spouse name: Not on file    Number of children: Not on file    Years of education: Not on file    Highest education level: Not on file   Occupational History    Not on file   Social Needs    Financial resource strain: Not on file    Food insecurity     Worry: Not on file     Inability: Not on file    Transportation needs     Medical: Not on file     Non-medical: Not on file   Tobacco Use    Smoking status: Former Smoker     Packs/day: 0.30     Years: 2.00     Pack years: 0.60     Types: Cigarettes     Quit date: 1960     Years since quittin.3    Smokeless tobacco: Never Used   Substance and Sexual Activity    Alcohol use:  Yes     Alcohol/week: 5.8 standard drinks     Comment: 3 drinks a week    Drug use: Never    Sexual activity: Not on file   Lifestyle    Physical activity     Days per week: Not on file     Minutes per session: Not on file    Stress: Not on file   Relationships    Social connections     Talks on phone: Not on file     Gets together: Not on file     Attends Synagogue service: Not on file     Active member of club or organization: Not on file     Attends meetings of clubs or organizations: Not on file     Relationship status: Not on file    Intimate partner violence     Fear of current or ex partner: Not on file     Emotionally abused: Not on file     Physically abused: Not on file     Forced sexual activity: Not on file   Other Topics Concern    Not on file   Social History Narrative    ** Merged History Encounter **            Family History:   Family History   Problem Relation Age of Onset    COPD Mother        REVIEW OF SYSTEMS:     · Constitutional: Denies fatigue, fevers, chills or night sweats  · Eyes: Denies visual changes or drainage  · ENT: Denies headaches or hearing loss. No mouth sores or sore throat. No epistaxis   · Cardiovascular: Denies chest pain, pressure or palpitations. No lower extremity swelling. · Respiratory: Denies LUONG, cough, orthopnea or PND. No hemoptysis   · Gastrointestinal: Denies hematemesis or anorexia. No hematochezia or melena    · Genitourinary: Denies urgency, dysuria or hematuria. · Musculoskeletal: Denies gait disturbance, weakness or joint complaints  · Integumentary: Denies rash, hives or pruritis   · Neurological: Denies dizziness, headaches or seizures. No numbness or tingling  · Psychiatric: Denies anxiety or depression. · Endocrine: Denies temperature intolerance. No recent weight change. .  · Hematologic/Lymphatic: Denies abnormal bruising or bleeding. No swollen lymph nodes    PHYSICAL EXAM:   BP (!) 144/79   Pulse 66   Resp 20   Ht 6' (1.829 m)   Wt 212 lb (96.2 kg)   SpO2 95%   BMI 28.75 kg/m²   CONST:  Well developed, well nourished who appears of stated age. Awake, alert and cooperative. No apparent distress. HEENT:   Head- Normocephalic, atraumatic   Eyes- Conjunctivae pink, anicteric  Throat- Oral mucosa pink and moist  Neck-  No stridor, trachea midline, no jugular venous distention. No carotid bruit. CHEST: Chest symmetrical and non-tender to palpation.  No accessory muscle use or intercostal retractions

## 2021-05-06 NOTE — PROGRESS NOTES
PT POST REZA REPORT RECEIVED PT DROWSY AWAKE WILL WEAN O2AS TOLERATED  0915 O2 OFF AT THIS TIME PT MORE AWAKE TAKING WATER AND ICE CHIPS  0920 DR BERKOWITZ SPOKE WITH PT WIFE AND UPDATED ON PROCEDURE AND FOLLOW UP  0930 DISCHARGE INSTRUCTIONS GIVEN TO PT WIFE AT THIS TIME AND VERBALIZED UNDERSTANDING OF INSTRUCTIONS PAMPHLETS GIVEN  ASSISTING PT WITH GETTING DRESSED AND UPDATED ON DISCHARGE INSTRUCTIONS WITH PT AS WELL AND FOLLOW UP

## 2021-05-07 ENCOUNTER — TELEPHONE (OUTPATIENT)
Dept: CARDIOLOGY CLINIC | Age: 86
End: 2021-05-07

## 2021-05-07 NOTE — TELEPHONE ENCOUNTER
----- Message from Analisa Byrnes MD sent at 5/7/2021 12:33 PM EDT -----  REZA showed moderate mitral regurgitation. No surgery at this time continue ,current medication follow-up with me as scheduled.

## 2021-05-07 NOTE — TELEPHONE ENCOUNTER
Patient's wife notified of REZA results and Dr. Garcia's recommendations. F/U scheduled for 6/14/21 at 3:00 p.m.

## 2021-06-01 ENCOUNTER — NURSE ONLY (OUTPATIENT)
Dept: NON INVASIVE DIAGNOSTICS | Age: 86
End: 2021-06-01
Payer: MEDICARE

## 2021-06-01 DIAGNOSIS — I49.5 SSS (SICK SINUS SYNDROME) (HCC): ICD-10-CM

## 2021-06-01 DIAGNOSIS — I48.0 PAROXYSMAL ATRIAL FIBRILLATION (HCC): ICD-10-CM

## 2021-06-01 DIAGNOSIS — Z95.0 CARDIAC PACEMAKER IN SITU: Primary | ICD-10-CM

## 2021-06-02 PROCEDURE — 93296 REM INTERROG EVL PM/IDS: CPT | Performed by: INTERNAL MEDICINE

## 2021-06-02 PROCEDURE — 93294 REM INTERROG EVL PM/LDLS PM: CPT | Performed by: INTERNAL MEDICINE

## 2021-06-28 ENCOUNTER — OFFICE VISIT (OUTPATIENT)
Dept: CARDIOLOGY CLINIC | Age: 86
End: 2021-06-28
Payer: MEDICARE

## 2021-06-28 VITALS
SYSTOLIC BLOOD PRESSURE: 110 MMHG | RESPIRATION RATE: 18 BRPM | WEIGHT: 212.3 LBS | HEIGHT: 72 IN | DIASTOLIC BLOOD PRESSURE: 62 MMHG | BODY MASS INDEX: 28.76 KG/M2 | HEART RATE: 66 BPM

## 2021-06-28 DIAGNOSIS — R07.89 ATYPICAL CHEST PAIN: ICD-10-CM

## 2021-06-28 DIAGNOSIS — I48.0 PAROXYSMAL ATRIAL FIBRILLATION (HCC): Primary | ICD-10-CM

## 2021-06-28 PROCEDURE — 93000 ELECTROCARDIOGRAM COMPLETE: CPT | Performed by: INTERNAL MEDICINE

## 2021-06-28 PROCEDURE — 99214 OFFICE O/P EST MOD 30 MIN: CPT | Performed by: INTERNAL MEDICINE

## 2021-06-28 NOTE — PATIENT INSTRUCTIONS
· Echo results were reviewed with the patient and normally functioning valve. · Schedule for an exercise nuclear stress test to evaluate atypical chest pain, and advised to got to ER if the chest pain lasts more than 5 minutes. · Continue current medications without any change. · Continue anticoagulation therapy with warfarin and rate control with metoprolol. · Follow up with pcp for INR monitoring.    · Follow-up with me in 3 months

## 2021-06-28 NOTE — PROGRESS NOTES
OUTPATIENT CARDIOLOGY FOLLOW-UP    Name: Caleb Fontana    Age: 81 y.o. Primary Care Physician: Mendel Galt, DO    Date of Service: 6/28/2021    Chief Complaint:   Chief Complaint   Patient presents with    Atrial Fibrillation     3 month ov- pt has complaints of pressure in chest and swelling in feet and legs       Interim History:   Mr. Zoe Adair is a 28-year-old gentleman with history of severe aortic stenosis underwent bioprosthetic AVR on 12/10/2013, also had a maze and left atrial clipping, hypertension and hypothyroidism on hormonal replacement therapy. He had a symptomatic sick sinus syndrome with a history of tachybradycardia syndrome and received [employer]onic dual-chamber pacemaker, history of proximal atrial fibrillation with rapid ventricular response and a bilateral pulmonary emboli in July 2012, history of L1 fracture secondary to fall status post kyphoplasty is here for follow-up visit. He was last seen in the office was in to 3/8/2021. Since his last visit, he has not had any ER visits or hospitalizations. He is having essential tremors and following with a neurologist at [facility]. He was on primidone for tremors which was discontinued. Currently he is taking Remeron 15 mg a day. No new cardiac complaints since last cardiology evaluation except for intermittent midsternal chest pressure for the past 3 to 4 months. He has been experiencing chest pressure 5-6 over 10 that last for about 3 to 4 minutes happens both at rest as well as with exertion and did not notice any associated symptoms such as dyspnea, sweating or nausea. He denies any aggravating relieving factors. Pain subsides spontaneously. The last episode of discomfort he had was about a 3 to 4 days back. Currently, he denies active chest pain. He denies SOB, palpitations, lightheadedness, dizziness, syncope, PND, or orthopnea.  He is following with his primary care provider for INR monitoring and denies any bleeding complications including blood or black stools. SR on EKG.     Review of Systems:   Cardiac: As per HPI  General: No fever, chills  Pulmonary: As per HPI  HEENT: No visual disturbances, difficult swallowing  GI: No nausea, vomiting  Endocrine: No thyroid disease or DM  Musculoskeletal: BURNETT x 4, no focal motor deficits  Skin: Intact, no rashes  Neuro/Psych: No headache or seizures    Past Medical History:  Past Medical History:   Diagnosis Date    Anticoagulated on Coumadin     Anxiety     Arthritis     Atrial fibrillation ([address]     follows Dr. Jennifer Pringle AUG 2014    BILATERAL, TEMPORAY,HAD 2 EPISODES LASTED 10-15 SECONDS / 2016 / no further episodes    Cancer ([address] 2013    skin    COPD (chronic obstructive pulmonary disease) (HCC)     Emphysema of lung (HCC)     mild    Environmental allergies     POLLEN    Essential tremor     head and hands    Fractured pelvis ([address] 1975    and fractured wrist after fall from tree    Frequency of urination     [place] (hard of hearing)     no aides    Hx of blood clots 07/2012    after kyphoplasty,PE    Hypertension     Mitral regurgitation     per echo 4/15/2021/ moderate to severe    Pulmonary embolism ([address] 07/2012    after kyphoplasty    Right leg DVT (HCC)     SSS (sick sinus syndrome) ([address]     pacer in place / [facility]    Thyroid disease     Wears dentures     PARTIAL UPPER       Past Surgical History:  Past Surgical History:   Procedure Laterality Date    A-V CARDIAC PACEMAKER INSERTION Left 01/28/2014    AORTIC VALVE REPLACEMENT  12-    bioprosthesis for severe AS    CARDIAC CATHETERIZATION  11/25/2013    CARDIOVERSION  12/23/2013    Dr Russell Gaspar  7/26/2012         ECHOCARDIOGRAM TRANSESOPHAGEAL  12/14/2012         ECHOCARDIOGRAM TRANSESOPHAGEAL  12/28/2012         ECHOCARDIOGRAM TRANSESOPHAGEAL  12/23/2013         FIXATION KYPHOPLASTY  07/11/12    Kyphoplasty L1-L2, Bone BX, Insertion Epidural Cath with Steroid Injection    FRACTURE SURGERY  1975    wrist    JOINT REPLACEMENT Bilateral  AND     bilateral knee replacement    OTHER SURGICAL HISTORY  12/18/15    radiofrequency ablation r leg    PROSTATE BIOPSY  12    TRANSESOPHAGEAL ECHOCARDIOGRAM  2012    per Dr. Yumiko Lo. ..unable to cardiovert d/t clot in atrium    VARICOSE VEIN SURGERY         Family History:  Family History   Problem Relation Age of Onset    COPD Mother        Social History:  Social History     Socioeconomic History    Marital status:      Spouse name: Not on file    Number of children: Not on file    Years of education: Not on file    Highest education level: Not on file   Occupational History    Not on file   Tobacco Use    Smoking status: Former Smoker     Packs/day: 0.30     Years: 2.00     Pack years: 0.60     Types: Cigarettes     Quit date: 1960     Years since quittin.5    Smokeless tobacco: Never Used   Vaping Use    Vaping Use: Never used   Substance and Sexual Activity    Alcohol use: Yes     Alcohol/week: 5.8 standard drinks     Comment: 3 drinks a week    Drug use: Never    Sexual activity: Not on file   Other Topics Concern    Not on file   Social History Narrative    ** Merged History Encounter **          Social Determinants of Health     Financial Resource Strain:     Difficulty of Paying Living Expenses:    Food Insecurity:     Worried About Running Out of Food in the Last Year:     Ran Out of Food in the Last Year:    Transportation Needs:     Lack of Transportation (Medical):      Lack of Transportation (Non-Medical):    Physical Activity:     Days of Exercise per Week:     Minutes of Exercise per Session:    Stress:     Feeling of Stress :    Social Connections:     Frequency of Communication with Friends and Family:     Frequency of Social Gatherings with Friends and Family:     Attends Yazdanism Services:     Active Member of Clubs or Organizations:     Attends Club or Organization Meetings:     Marital Status:    Intimate Partner Violence:     Fear of Current or Ex-Partner:     Emotionally Abused:     Physically Abused:     Sexually Abused: Allergies: Allergies   Allergen Reactions    Pollen Extract      Other reaction(s): Other: See Comments  sneezing       Current Medications:  Current Outpatient Medications   Medication Sig Dispense Refill    mirtazapine (REMERON) 15 MG tablet Take 15 mg by mouth nightly       gabapentin (NEURONTIN) 100 MG capsule 300 mg 3 times daily.  candesartan (ATACAND) 16 MG tablet Take 16 mg by mouth daily       levothyroxine (SYNTHROID) 25 MCG tablet Take 25 mcg by mouth nightly       warfarin (COUMADIN) 6 MG tablet Take 3.5 mg by mouth daily [facility] manages pts coumadin      metoprolol (TOPROL-XL) 25 MG XL tablet Take 1 tablet by mouth 2 times daily. 60 tablet 3    magnesium oxide (MAG-OX) 400 (240 MG) MG tablet Take 1 tablet by mouth daily. (Patient taking differently: Take 250 mg by mouth daily ) 30 tablet 3     No current facility-administered medications for this visit. Physical Exam:  /62   Pulse 66   Resp 18   Ht 6' (1.829 m)   Wt 212 lb 4.8 oz (96.3 kg)   BMI 28.79 kg/m²   Wt Readings from Last 3 Encounters:   06/28/21 212 lb 4.8 oz (96.3 kg)   05/06/21 212 lb (96.2 kg)   04/30/21 216 lb (98 kg)     Appearance: Awake, alert and oriented x 3, no acute respiratory distress  Skin: Intact, no rash  Head: Normocephalic, atraumatic  Eyes: EOMI, no conjunctival erythema  ENMT: No pharyngeal erythema, MMM, no rhinorrhea  Neck: Supple, no elevated JVP, no carotid bruits  Lungs: Clear to auscultation bilaterally. No wheezes, rales, or rhonchi. Cardiac: Regular rate and rhythm, +S1S2, ESM 2/6 over the right upper sternal border, midsystolic murmur 3/6 heard over the left lower sternal border that increases with inspiration.   Abdomen: Soft, nontender, abnormalities   The left atrium is severely dilated.   Severely enlarged right atrium.   Mild mitral regurgitation is present.   Well seated bio prosthetic aortic valve # 29   Pacer wire visualized in right ventricle. TTE5/15/2021:  Normal left ventricle size and systolic function. Ejection fraction is visually estimated at 60-65%. No regional wall motion abnormalities seen. Mild concentric left ventricular hypertrophy. Stage III diastolic dysfunction. The left atrium is severely dilated. Mildly dilated right ventricle. Right ventricle global systolic function is normal . TAPSE 21 mm. Moderate-to-severe mitral regurgitation with anteriorly directed jet. Normally functioning bioprosthetic valve in aortic position. Aortic chaitanya peak velocity 2 m/s, mean gradient 9 mmHg. DVI 0.35. ACT 90   ms. No evidence of aortic valve regurgitation. Mild to moderate tricuspid regurgitation. RVSP is 37 mmHg. Normal estimated PA systolic pressure. No evidence for hemodynamically significant pericardial effusion. Consider REZA for further evaluation of mitral regurgitation. REZA5/6/2021    Normal left ventricle size and systolic function. Ejection fraction is visually estimated at 60-65%. No regional wall motion abnormalities seen. Normal left ventricle wall thickness. Mildly dilated left atrium. Normal right ventricular size and function. Moderate mitral regurgitation with anteriorly directed jet. VC 0.6 cm, ERO   0.6 cm2, no PV flow reversal.   Normally functioning bioprosthetic valve in aortic position. No hemodynamically significant aortic stenosis is present. TAYLOR by direct   planimetry is 2.5 cm2. Mild tricuspid regurgitation. RVSP is 26 mmHg. Normal estimated PA systolic pressure. No evidence for hemodynamically significant pericardial effusion. Recent pacer evaluation done on 2/28/2020:  Battery life 3 years, A pacing 70%, ventricular pacing 36%, mode switch 0%, AT AF

## 2021-07-12 ENCOUNTER — TELEPHONE (OUTPATIENT)
Dept: CARDIOLOGY | Age: 86
End: 2021-07-12

## 2021-07-26 ENCOUNTER — TELEPHONE (OUTPATIENT)
Dept: CARDIOLOGY CLINIC | Age: 86
End: 2021-07-26

## 2021-07-26 DIAGNOSIS — R07.9 CHEST PAIN, UNSPECIFIED TYPE: Primary | ICD-10-CM

## 2021-07-26 NOTE — TELEPHONE ENCOUNTER
He has predominantly atrial paced rhythm, technically he should go for exercise.,  Okay to switch to [facility] nuclear stress test

## 2021-07-27 ENCOUNTER — TELEPHONE (OUTPATIENT)
Dept: CARDIOLOGY | Age: 86
End: 2021-07-27

## 2021-07-28 ENCOUNTER — TELEPHONE (OUTPATIENT)
Dept: CARDIOLOGY | Age: 86
End: 2021-07-28

## 2021-07-29 ENCOUNTER — TELEPHONE (OUTPATIENT)
Dept: CARDIOLOGY | Age: 86
End: 2021-07-29

## 2021-07-29 NOTE — TELEPHONE ENCOUNTER
CALLED AND SPOKE WITH PATIENTS WIFE TO RESCHEDULE STRESS TEST FOR 08-03   REVIEWED COVID CHECKLIST WITH PATIENT.

## 2021-07-29 NOTE — TELEPHONE ENCOUNTER
CALLED PATIENT AND LEFT MESSAGE TO RESCHEDULE STRESS TEST.  WE WERE ABLE TO OBTAINED THE AUTH    Electronically signed by Yamilka Vegas on 7/29/2021 at 8:30 AM

## 2021-08-02 ENCOUNTER — TELEPHONE (OUTPATIENT)
Dept: CARDIOLOGY | Age: 86
End: 2021-08-02

## 2021-08-02 NOTE — TELEPHONE ENCOUNTER
Spoke with patient's wife and confirmed Lexiscan stress test on August 3, 2021 at 0730. Instructions for test and COVID-19 preprocedure checklist reviewed with wife.

## 2021-08-03 ENCOUNTER — HOSPITAL ENCOUNTER (OUTPATIENT)
Dept: CARDIOLOGY | Age: 86
Discharge: HOME | End: 2021-08-03
Payer: MEDICARE

## 2021-08-03 VITALS — WEIGHT: 212 LBS | BODY MASS INDEX: 28.71 KG/M2 | HEIGHT: 72 IN

## 2021-08-03 DIAGNOSIS — R07.89 ATYPICAL CHEST PAIN: ICD-10-CM

## 2021-08-03 DIAGNOSIS — R07.9 CHEST PAIN, UNSPECIFIED TYPE: ICD-10-CM

## 2021-08-03 PROCEDURE — 3430000000 HC RX DIAGNOSTIC RADIOPHARMACEUTICAL: Performed by: INTERNAL MEDICINE

## 2021-08-03 PROCEDURE — A9500 TC99M SESTAMIBI: HCPCS | Performed by: INTERNAL MEDICINE

## 2021-08-03 PROCEDURE — 78452 HT MUSCLE IMAGE SPECT MULT: CPT

## 2021-08-03 PROCEDURE — 6360000002 HC RX W HCPCS: Performed by: INTERNAL MEDICINE

## 2021-08-03 PROCEDURE — 93017 CV STRESS TEST TRACING ONLY: CPT

## 2021-08-03 PROCEDURE — 2580000003 HC RX 258: Performed by: INTERNAL MEDICINE

## 2021-08-03 RX ORDER — SODIUM CHLORIDE 0.9 % (FLUSH) 0.9 %
10 SYRINGE (ML) INJECTION PRN
Status: DISCONTINUED | OUTPATIENT
Start: 2021-08-03 | End: 2021-08-04 | Stop reason: HOSPADM

## 2021-08-03 RX ADMIN — SODIUM CHLORIDE, PRESERVATIVE FREE 10 ML: 5 INJECTION INTRAVENOUS at 10:44

## 2021-08-03 RX ADMIN — Medication 30.6 MILLICURIE: at 10:44

## 2021-08-03 RX ADMIN — SODIUM CHLORIDE, PRESERVATIVE FREE 10 ML: 5 INJECTION INTRAVENOUS at 10:45

## 2021-08-03 RX ADMIN — Medication 9.7 MILLICURIE: at 07:47

## 2021-08-03 RX ADMIN — SODIUM CHLORIDE, PRESERVATIVE FREE 10 ML: 5 INJECTION INTRAVENOUS at 07:47

## 2021-08-03 RX ADMIN — REGADENOSON 0.4 MG: 0.08 INJECTION, SOLUTION INTRAVENOUS at 10:44

## 2021-08-03 NOTE — PROCEDURES
[address] [facility] [address]  [phone]                Pharmacologic Stress Nuclear Gated SPECT Study    Name: [address] Account Number: [de-identified]    :  1935          Sex: male         Date of Study:  8/3/2021    Height: 6' (182.9 cm)         Weight: 212 lb (96.2 kg)     Ordering Provider: Tiffanie Morrow MD          PCP: Stephanie Cee DO      Cardiologist: Tiffanie Morrow MD     [address]          Interpreting Physician: Cole Oakley. Sandro Hutchison MD  _________________________________________________________________________________    Indication:   Detecting the presence and location of coronary artery disease    Clinical History:   Patient has {MISC; KNOWN HISTORY YES/NO:50231} of coronary artery disease. Resting ECG:    OK int *** sec, QRS int *** sec, QT int *** sec; HR *** bpm  {Resting ECG Findings:74771}    Procedure:   Pharmacologic stress testing was performed with regadenoson 0.4 mg for 15 seconds. Additionally, low-level exercise was performed along with the infusion. The heart rate was *** at baseline and asha to *** beats during the infusion. This corresponds to ***% of maximum predicted heart rate. The blood pressure at baseline was *** and blood pressure at the end of infusion was ***. Blood pressure response was {Blank  Single:::\"hypertensive\",\"hypotensive\",\"normal\"} during the stress procedure. The patient {Blank single:::\"experienced *** during the infusion\",\"tolerated the infusion well\"}. ECG during the infusion {Blank single:::\"developed ST segment changes consistent with ischemia\",\"did not change\"}. IMAGING: Myocardial perfusion imaging was performed at rest 30-35 minutes following the intravenous injection of *** mCi of (Tc-Sestamibi) followed by 10 ml of Normal Saline.   As per infusion protocol, the patient was injected intravenously with *** mCi of (Tc-Sestamibi) followed by 10 ml of Normal Saline. Gated post-stress tomographic imaging was performed 20-25 minutes after stress. FINDINGS: The overall quality of the study was {RATIN}. Left ventricular cavity size was noted to be {Blank single:54926::\"enlarged on rest study only\",\"enlarged on stress study only\",\"enlarged on both rest and stress studies\",\"normal\"}. Rotational analog analysis demonstrated {Nuclear Stress Test Findings:99644::\"no patient motion or abnormal extracardiac radioactivity\"}. The gated SPECT stress imaging in the short, vertical long, and horizontal long axis demonstrated normal homogeneous tracer distribution throughout the myocardium. IF TEST NORMAL-HIGHLIGHT AND DELETE FOLLOWING SECTIONS:    A {MILD, MOD, SEV:58580} defect was present in the {Nuclear Stress Test Defect Segments:75069} wall(s) that was  {Blank single:40473::\"small\",\"moderate\",\"large\"} sized by quantification. There also was a {MILD, MOD, SEV:66027} defect present in the {Nuclear Stress Test Defect Segments:29940} wall(s) that was  {Blank single:98310::\"small\",\"moderate\",\"large\"} sized by quantification:     The resting images {Blank single:74625::\"are normal\",\"reveal partial reversibility\",\"reveal complete reversibility\",\"show no change\"}. Gated SPECT left ventricular ejection fraction was calculated to be ***%, with {Myocardial Wall Motion:45634::\"normal myocardial thickening and wall motion\"}. Impression:    1. ECG during the infusion {Blank single:73409::\"developed ST segment changes consistent with ischemia\",\"did not change\"}. 2. The myocardial perfusion imaging was {Myocardial perfusion imagin}. IF TEST NORMAL-HIGHLIGHT AND DELETE FOLLOWING SECTIONS:  The abnormality was a {Myocardial imaging defects:50886}    3.  Overall left ventricular systolic function was {Blank single:80719::\"abnormal with regional wall motion abnormalities\",\"abnormal without regional wall motion abnormalities\",\"normal without regional wall motion abnormalities\"}. 4. {Blank single:00171::\"High\",\"Intermediate\",\"Low\"} risk {Blank single:92907::\"pre-operative\",\"post MI\",\"general\"} pharmacologic stress test.    Thank you for sending your patient to this [employer].      {Esignature:[id number]}

## 2021-08-03 NOTE — PROCEDURES
[address] [facility] [address] [facility] - [address], [address] [facility]  [phone]                Pharmacologic Stress Nuclear Gated SPECT Study    Name: [address] Account Number: [de-identified]    :  1935          Sex: male         Date of Study:  8/3/2021    Height: 6' (182.9 cm)         Weight: 212 lb (96.2 kg)     Ordering Provider: Hoang Correa MD          PCP: Morgan Glynn DO      Cardiologist: Hoang Correa MD              Interpreting Physician: Oj Koo MD   _________________________________________________________________________________    Indication:   Detecting the presence and location of coronary artery disease    Clinical History:   Patient has no known history of coronary artery disease. Resting ECG:    [place] int .24 sec, QRS int . 10 sec, QT int .38 sec; HR 65 bpm  Normal sinus rhythm, First degree AV block and Nonspecific ST-T wave changes    Procedure:   Pharmacologic stress testing was performed with regadenoson 0.4 mg for 15 seconds. Additionally, low-level exercise was performed along with the infusion. The heart rate was 65 at baseline and asha to 85 beats during the infusion. This corresponds to 63% of maximum predicted heart rate. The blood pressure at baseline was 140/68 and blood pressure at the end of infusion was 138/70. Blood pressure response was normal during the stress procedure. The patient experienced \"pressure on my heart\" and lightheadedness during the infusion which subsided in recovery    ECG during the infusion did not change. IMAGING: Myocardial perfusion imaging was performed at rest 30-35 minutes following the intravenous injection of 9.7 mCi of (Tc-Sestamibi) followed by 10 ml of Normal Saline. As per infusion protocol, the patient was injected intravenously with 30.6 mCi of (Tc-Sestamibi) followed by 10 ml of Normal Saline.   Gated post-stress tomographic imaging was performed 20-25 minutes after stress. FINDINGS: The overall quality of the study was fair. Left ventricular cavity size was noted to be normal.    Rotational analog analysis demonstrated no patient motion or abnormal extracardiac radioactivity. The gated SPECT stress imaging in the short, vertical long, and horizontal long axis demonstrated normal homogeneous tracer distribution throughout the myocardium. The resting images show no change. Gated SPECT left ventricular ejection fraction was calculated to be 60%, with normal myocardial thickening and wall motion. Impression:    1. ECG during the infusion did not change. 2. The myocardial perfusion imaging was normal with attenuation artifact. 3. Overall left ventricular systolic function was normal without regional wall motion abnormalities. 4. Low risk general pharmacologic stress test.    Thank you for sending your patient to this [employer].      Electronically signed by Watson Carrel, MD on 8/3/21 at 2:24 PM EDT

## 2021-08-04 ENCOUNTER — TELEPHONE (OUTPATIENT)
Dept: CARDIOLOGY CLINIC | Age: 86
End: 2021-08-04

## 2022-01-19 ENCOUNTER — TELEPHONE (OUTPATIENT)
Dept: NON INVASIVE DIAGNOSTICS | Age: 87
End: 2022-01-19

## 2022-01-19 NOTE — TELEPHONE ENCOUNTER
Returned patient wife, Marlen Hernandez, call. She was wondering when Vladimir Rosales was due for an office appointment with Dr. Marlen Koch. I told her I sent a message to the schedulers to make an appointment for Vlaidmir Rosales. I instructed her to contact the office if she does not hear back from somebody by next week. I did inform her we received a remote Merlin transmission on 12/2/21 which was WNL. His next remote pacemaker check will be on 3/3/22. [place] verbalized understanding.     Zhang Biswas RN, BSN  [facility]

## 2022-02-01 ENCOUNTER — OFFICE VISIT (OUTPATIENT)
Dept: CARDIOLOGY CLINIC | Age: 87
End: 2022-02-01
Payer: MEDICARE

## 2022-02-01 VITALS
BODY MASS INDEX: 28.81 KG/M2 | DIASTOLIC BLOOD PRESSURE: 80 MMHG | WEIGHT: 212.7 LBS | RESPIRATION RATE: 16 BRPM | HEART RATE: 67 BPM | SYSTOLIC BLOOD PRESSURE: 102 MMHG | HEIGHT: 72 IN

## 2022-02-01 DIAGNOSIS — I34.0 NONRHEUMATIC MITRAL VALVE REGURGITATION: ICD-10-CM

## 2022-02-01 DIAGNOSIS — I49.5 SSS (SICK SINUS SYNDROME) (HCC): ICD-10-CM

## 2022-02-01 DIAGNOSIS — I10 HTN (HYPERTENSION), BENIGN: Primary | ICD-10-CM

## 2022-02-01 DIAGNOSIS — I48.0 PAROXYSMAL ATRIAL FIBRILLATION (HCC): ICD-10-CM

## 2022-02-01 PROCEDURE — 93000 ELECTROCARDIOGRAM COMPLETE: CPT | Performed by: INTERNAL MEDICINE

## 2022-02-01 PROCEDURE — 99214 OFFICE O/P EST MOD 30 MIN: CPT | Performed by: INTERNAL MEDICINE

## 2022-02-01 NOTE — PATIENT INSTRUCTIONS
· Recent stress test results reviewed with the patient and there is no evidence of ischemia based on the stress test.  · BP is soft but without any symptoms, will decrease to Candersartan 8 mg po daily. · Continue current medications without any change. · Continue anticoagulation therapy with warfarin and rate control with metoprolol. · Follow up with pcp for INR monitoring.    · Follow-up with me in 6 months

## 2022-02-01 NOTE — PROGRESS NOTES
OUTPATIENT CARDIOLOGY FOLLOW-UP    Name: Ramirez Woods    Age: 81 y.o. Primary Care Physician: Homa Izaguirre DO    Date of Service: 2/1/2022    Chief Complaint:   Chief Complaint   Patient presents with    Atrial Fibrillation     Office Visit- Patient has no complaints. Interim History:   Mr. Nidhi Rolle is a 81-year-old gentleman with history of severe aortic stenosis underwent bioprosthetic AVR on 12/10/2013, also had a maze and left atrial clipping, hypertension and hypothyroidism on hormonal replacement therapy. He had a symptomatic sick sinus syndrome with a history of tachybradycardia syndrome and received Medtronic dual-chamber pacemaker, history of proximal atrial fibrillation with rapid ventricular response and a bilateral pulmonary emboli in July 2012, history of L1 fracture secondary to fall status post kyphoplasty is here for follow-up visit. He was last seen in the office was in to 6/28/2021. Since his last visit, he has not had any ER visits or hospitalizations. He is having essential tremors and following with a neurologist at [facility]. He was on primidone for tremors which was discontinued. Currently he is taking Remeron 15 mg a day and he thinks it is not helping at all, has difficulty buttoning his shirt. No new cardiac complaints since last cardiology evaluation. Currently, he denies active chest pain. He denies SOB, palpitations, lightheadedness, dizziness, syncope, PND, or orthopnea. He is following with his primary care provider for INR monitoring and denies any bleeding complications including blood or black stools. SR on EKG.     Review of Systems:   Cardiac: As per HPI  General: No fever, chills  Pulmonary: As per HPI  HEENT: No visual disturbances, difficult swallowing  GI: No nausea, vomiting  Endocrine: No thyroid disease or DM  Musculoskeletal: BURNETT x 4, no focal motor deficits  Skin: Intact, no rashes  Neuro/Psych: No headache or seizures    Past Medical History:  Past Medical History:   Diagnosis Date    Anticoagulated on Coumadin     Anxiety     Arthritis     Atrial fibrillation (HCC)     follows Dr. Gucci Saldana AUG 2014    BILATERAL, TEMPORAY,HAD 2 EPISODES LASTED 10-15 SECONDS / 2016 / no further episodes    Cancer ([address] 2013    skin    COPD (chronic obstructive pulmonary disease) (HCC)     Emphysema of lung (HCC)     mild    Environmental allergies     POLLEN    Essential tremor     head and hands    Fractured pelvis ([address] 1975    and fractured wrist after fall from tree    Frequency of urination     [place] (hard of hearing)     no aides    Hx of blood clots 07/2012    after kyphoplasty,PE    Hypertension     Mitral regurgitation     per echo 4/15/2021/ moderate to severe    Pulmonary embolism ([address] 07/2012    after kyphoplasty    Right leg DVT (HCC)     SSS (sick sinus syndrome) ([address]     pacer in place / [facility]    Thyroid disease     Wears dentures     PARTIAL UPPER       Past Surgical History:  Past Surgical History:   Procedure Laterality Date    A-V CARDIAC PACEMAKER INSERTION Left 01/28/2014    AORTIC VALVE REPLACEMENT  12-    bioprosthesis for severe AS    CARDIAC CATHETERIZATION  11/25/2013    CARDIOVERSION  12/23/2013    Dr Tobin Pallas  7/26/2012         ECHOCARDIOGRAM TRANSESOPHAGEAL  12/14/2012         ECHOCARDIOGRAM TRANSESOPHAGEAL  12/28/2012         ECHOCARDIOGRAM TRANSESOPHAGEAL  12/23/2013         FIXATION KYPHOPLASTY  07/11/12    Kyphoplasty L1-L2, Bone BX, Insertion Epidural Cath with Steroid Injection    FRACTURE SURGERY  1975    wrist    JOINT REPLACEMENT Bilateral 2002 AND 2003    bilateral knee replacement    OTHER SURGICAL HISTORY  12/18/15    radiofrequency ablation r leg    PROSTATE BIOPSY  9/6/12    TRANSESOPHAGEAL ECHOCARDIOGRAM  12/28/2012    per Dr. Tessie Martinez. ..unable to cardiovert d/t clot in atrium    VARICOSE VEIN SURGERY         Family History:  Family History   Problem Relation Age of Onset    COPD Mother        Social History:  Social History     Socioeconomic History    Marital status:      Spouse name: Not on file    Number of children: Not on file    Years of education: Not on file    Highest education level: Not on file   Occupational History    Not on file   Tobacco Use    Smoking status: Former Smoker     Packs/day: 0.30     Years: 2.00     Pack years: 0.60     Types: Cigarettes     Quit date: 1960     Years since quittin.1    Smokeless tobacco: Never Used   Vaping Use    Vaping Use: Never used   Substance and Sexual Activity    Alcohol use: Yes     Alcohol/week: 5.8 standard drinks     Comment: 3 drinks a week    Drug use: Never    Sexual activity: Not on file   Other Topics Concern    Not on file   Social History Narrative    ** Merged History Encounter **          Social Determinants of Health     Financial Resource Strain:     Difficulty of Paying Living Expenses: Not on file   Food Insecurity:     Worried About Running Out of Food in the Last Year: Not on file    [place] of Food in the Last Year: Not on file   Transportation Needs:     Lack of Transportation (Medical): Not on file    Lack of Transportation (Non-Medical):  Not on file   Physical Activity:     Days of Exercise per Week: Not on file    Minutes of Exercise per Session: Not on file   Stress:     Feeling of Stress : Not on file   Social Connections:     Frequency of Communication with Friends and Family: Not on file    Frequency of Social Gatherings with Friends and Family: Not on file    Attends Religion Services: Not on file    Active Member of Clubs or Organizations: Not on file    Attends Club or Organization Meetings: Not on file    Marital Status: Not on file   Intimate Partner Violence:     Fear of Current or Ex-Partner: Not on file    Emotionally Abused: Not on file    Physically Abused: Not on file    Sexually Abused: Not on file   Housing Stability:     Unable to Pay for Housing in the Last Year: Not on file    Number of Places Lived in the Last Year: Not on file    Unstable Housing in the Last Year: Not on file       Allergies: Allergies   Allergen Reactions    Pollen Extract      Other reaction(s): Other: See Comments denied respiratory problems  sneezing       Current Medications:  Current Outpatient Medications   Medication Sig Dispense Refill    mirtazapine (REMERON) 15 MG tablet Take 15 mg by mouth nightly       gabapentin (NEURONTIN) 100 MG capsule 300 mg 3 times daily.  candesartan (ATACAND) 16 MG tablet Take 16 mg by mouth daily       levothyroxine (SYNTHROID) 25 MCG tablet Take 25 mcg by mouth nightly       warfarin (COUMADIN) 6 MG tablet Take 3.5 mg by mouth daily [facility] manages pts coumadin      metoprolol (TOPROL-XL) 25 MG XL tablet Take 1 tablet by mouth 2 times daily. 60 tablet 3    magnesium oxide (MAG-OX) 400 (240 MG) MG tablet Take 1 tablet by mouth daily. (Patient taking differently: Take 250 mg by mouth daily ) 30 tablet 3     No current facility-administered medications for this visit. Physical Exam:  /80   Pulse 67   Resp 16   Ht 6' (1.829 m)   Wt 212 lb 11.2 oz (96.5 kg)   BMI 28.85 kg/m²   Wt Readings from Last 3 Encounters:   02/01/22 212 lb 11.2 oz (96.5 kg)   08/03/21 212 lb (96.2 kg)   06/28/21 212 lb 4.8 oz (96.3 kg)     Appearance: Awake, alert and oriented x 3, no acute respiratory distress  Skin: Intact, no rash  Head: Normocephalic, atraumatic  Eyes: EOMI, no conjunctival erythema  ENMT: No pharyngeal erythema, MMM, no rhinorrhea  Neck: Supple, no elevated JVP, no carotid bruits  Lungs: Clear to auscultation bilaterally. No wheezes, rales, or rhonchi.   Cardiac: Regular rate and rhythm, +S1S2, ESM 2/6 over the right upper sternal border, midsystolic murmur 3/6 heard over the left lower sternal border that increases with inspiration. Abdomen: Soft, nontender, +bowel sounds  Extremities: Moves all extremities x 4 trace lower extremity edema  Neurologic: No focal motor deficits apparent, normal mood and affect, alert and oriented x 3  Peripheral Pulses: Intact posterior tibial pulses bilaterally    Laboratory Tests:  Lab Results   Component Value Date    CREATININE 0.8 12/18/2015    BUN 13 12/18/2015     12/18/2015    K 4.3 12/18/2015    CL 98 12/18/2015    CO2 30 (H) 12/18/2015     Lab Results   Component Value Date    MG 2.1 09/25/2014     Lab Results   Component Value Date    WBC 4.7 12/18/2015    HGB 14.3 12/18/2015    HCT 43.3 12/18/2015    MCV 93.0 12/18/2015     12/18/2015     Lab Results   Component Value Date    ALT 14 08/24/2014    AST 38 08/24/2014    ALKPHOS 70 08/24/2014    BILITOT 0.3 08/24/2014     Lab Results   Component Value Date    CKTOTAL 43 12/16/2014    CKMB 1.4 12/16/2014    TROPONINI <0.01 12/16/2014    TROPONINI <0.01 02/01/2014    TROPONINI 0.03 12/22/2013     Lab Results   Component Value Date    INR 1.4 12/18/2015    INR 2.3 12/16/2014    INR 2.8 09/25/2014    PROTIME 15.3 (H) 12/18/2015    PROTIME 25.7 (H) 12/16/2014    PROTIME 30.5 (H) 09/25/2014     Lab Results   Component Value Date    TSH 3.830 12/16/2014     Lab Results   Component Value Date    LABA1C 5.9 12/09/2013     No results found for: EAG  Lab Results   Component Value Date    CHOL 189 08/25/2014     Lab Results   Component Value Date    TRIG 135 08/25/2014     Lab Results   Component Value Date    HDL 35 08/25/2014     Lab Results   Component Value Date    LDLCALC 127 (H) 08/25/2014     Lab Results   Component Value Date    LABVLDL 27 08/25/2014     No results found for: [facility]    Cardiac Tests:  ECG: AV sequential pacemaker with prolonged OR interval.        Echocardiogram: 3/31/2017   Normal left ventricle size and systolic function   Ejection fraction is visually estimated at 60%.    Mild left ventricular concentric hypertrophy noted.   No wall motion abnormalities   The left atrium is severely dilated.   Severely enlarged right atrium.   Mild mitral regurgitation is present.   Well seated bio prosthetic aortic valve # 29   Pacer wire visualized in right ventricle. TTE-5/15/2021:  Normal left ventricle size and systolic function. Ejection fraction is visually estimated at 60-65%. No regional wall motion abnormalities seen. Mild concentric left ventricular hypertrophy. Stage III diastolic dysfunction. The left atrium is severely dilated. Mildly dilated right ventricle. Right ventricle global systolic function is normal . TAPSE 21 mm. Moderate-to-severe mitral regurgitation with anteriorly directed jet. Normally functioning bioprosthetic valve in aortic position. Aortic chaitanya peak velocity 2 m/s, mean gradient 9 mmHg. DVI 0.35. ACT 90   ms. No evidence of aortic valve regurgitation. Mild to moderate tricuspid regurgitation. RVSP is 37 mmHg. Normal estimated PA systolic pressure. No evidence for hemodynamically significant pericardial effusion. Consider REZA for further evaluation of mitral regurgitation. REZA-5/6/2021    Normal left ventricle size and systolic function. Ejection fraction is visually estimated at 60-65%. No regional wall motion abnormalities seen. Normal left ventricle wall thickness. Mildly dilated left atrium. Normal right ventricular size and function. Moderate mitral regurgitation with anteriorly directed jet. VC 0.6 cm, ERO   0.6 cm2, no PV flow reversal.   Normally functioning bioprosthetic valve in aortic position. No hemodynamically significant aortic stenosis is present. TAYLOR by direct   planimetry is 2.5 cm2. Mild tricuspid regurgitation. RVSP is 26 mmHg. Normal estimated PA systolic pressure. No evidence for hemodynamically significant pericardial effusion. Lexiscan nuclear stress test-8/3/2021  Impression:    1.  ECG during the infusion did not change. 2. The myocardial perfusion imaging was normal with attenuation artifact. 3. Overall left ventricular systolic function was normal without regional wall motion abnormalities. 4. Low risk general pharmacologic stress test.      Recent pacer evaluation done on 2/28/2020: Battery life 3 years, A pacing 70%, ventricular pacing 36%, mode switch 0%, AT AF burden 0%    Pacer evaluation-12/2/2021  Implant Indication: AF, SSS, hx AVR:  Per medication list, pt taking Metoprolol S, Warfarin  Mode: DDDR LRL 65 bpm  Arrhythmias: None since 9/2021 routine report  Battery longevity 2 yrs:  Lead trends are stable and within normal limits  Presenting rhythm: ApVp  HR Histograms show normal HR distribution  RVp 72% (VIP on, extension 50ms)    The ASCVD Risk score (Emmanuel Emery, et al., 2013) failed to calculate for the following reasons: The 2013 ASCVD risk score is only valid for ages 36 to 78        ASSESSMENT:  · Atypical chest pain , no further episode of chest pain, stress test showed no evidence of ischemia. · Paroxysmal atrial fibrillation now in sinus rhythm, AF burden 0% since his last pacer evaluation. · Normally functioning bioprosthetic aortic valve, post bioprosthetic AVR, MAZE procedure,  PRO clipping-2013  · VHD: Moderate mitral regurgitation  · Hypertension on Benicar, well controlled  · Sick sinus syndrome status post DDDR, [facility] pacer implanted on 1/28/2014  · Hypothyroidism on hormone replacement therapy,  · Benign essential tremors  · Bilateral lower extremity edema, trace,stable     Plan:   · Recent stress test results reviewed with the patient and there is no evidence of ischemia based on the stress test.  · BP is soft but without any symptoms, will decrease Candesartan to 8 mg po daily. · Continue current medications without any change. · Continue anticoagulation therapy with warfarin and rate control with metoprolol. · Follow up with pcp for INR monitoring.    · Follow-up with me in 6 months     The patient's current medication list, allergies, problem list and results of all previously ordered testing were reviewed at today's visit.     Elizabeth Weber MD  [facility]) Cardiology

## 2022-05-05 ENCOUNTER — TELEPHONE (OUTPATIENT)
Dept: NON INVASIVE DIAGNOSTICS | Age: 87
End: 2022-05-05

## 2022-05-05 NOTE — TELEPHONE ENCOUNTER
Called and spoke to [facility] wife. I let her know that Yara Devine has not been seen since 2/21/2019. It has been longer than 3 years so we need a new pt referral from Dr Morena Pollard.  She was going to call to get the referral. I explained that we will call to scheduled back to schedule

## 2022-05-20 ENCOUNTER — TELEPHONE (OUTPATIENT)
Dept: NON INVASIVE DIAGNOSTICS | Age: 87
End: 2022-05-20

## 2022-05-26 ENCOUNTER — TELEPHONE (OUTPATIENT)
Dept: NON INVASIVE DIAGNOSTICS | Age: 87
End: 2022-05-26

## 2022-05-26 NOTE — TELEPHONE ENCOUNTER
Pt's wife called for an appt. MA adv Pt is scheduled on 06/03/2022 at 1:00 PM. Wife verbalized understanding.      Electronically signed by Jordon Perez MA on 5/26/2022 at 9:12 AM

## 2022-06-01 NOTE — PROGRESS NOTES
Electrophysiology Outpatient Progress Note    Amber Balderrama  1935  Date of Service: 6/3/2022  Referring Provider/PCP: Pepe Cuba DO  Primary Electrophysiologist: Clement Brambila MD    Chief Complaint: Pacemaker management     SUBJECTIVE: Amber Balderrama presents to the office today for a overdue office follow -up. He was last seen on 2/21/2019 and has not been seen since. The patient currently feels well and offers no complaints from a device POV. The device site looks well healed and free from infection or erosion. The patient denies any chest pain, dyspnea, palpitations, dizziness, syncope, orthopnea or paroxysmal nocturnal dyspnea.      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, bilateral legs [facility] 06/03/2022     Priority: Medium    Amaurosis 09/17/2014     Priority: Medium    Migraine 09/17/2014     Priority: Medium    Arthritis 08/08/2014     Priority: Medium    Acute maxillary sinusitis 07/25/2014     Priority: Medium    Ataxia 05/12/2014     Priority: Medium    Dysarthria 05/12/2014     Priority: Medium    Intervertebral cervical disc disorder with myelopathy, cervical region 05/12/2014     Priority: Medium    Tremor 05/12/2014     Priority: Medium    Ulnar neuropathy at elbow 05/12/2014     Priority: Medium    Essential tremor 04/10/2014     Priority: Medium    Gout 04/10/2014     Priority: Medium    Hypothyroidism 03/23/2014     Priority: Medium    Acute prostatitis 09/24/2012     Priority: Medium    Urinary tract infectious disease 09/24/2012     Priority: Medium    Hard, firm prostate 08/01/2012     Priority: Medium    Aortic valve disorder 04/29/2011     Priority: Medium    Hyperlipidemia 04/29/2011     Priority: Medium    Arthralgia of lower leg 03/03/2011     Priority: Medium    Benign prostatic hyperplasia without urinary obstruction 02/10/2011     Priority: Medium    Malaise and fatigue 02/10/2011     Priority: Medium    Cardiac pacemaker in situ 06/27/2017    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 11/03/2015    TIA (transient ischemic attack) 08/25/2014    SSS (sick sinus syndrome) ([facility]) 02/02/2014    S/P AVR 12/10/2013    MR (mitral regurgitation) 12/10/2013    Atrial fibrillation ([facility]) 07/25/2012    COPD (chronic obstructive pulmonary disease) ([facility] 07/25/2012    HTN (hypertension), benign 07/12/2012    Aortic stenosis 07/12/2012       Current Outpatient Medications   Medication Sig Dispense Refill    cetirizine (ZYRTEC) 10 MG tablet Take 10 mg by mouth daily      olmesartan (BENICAR) 20 mg tablet Take 20 mg by mouth daily      Omega-3 1000 MG CAPS Take by mouth daily      mirtazapine (REMERON) 15 MG tablet Take 15 mg by mouth nightly       gabapentin (NEURONTIN) 100 MG capsule 300 mg 3 times daily.  candesartan (ATACAND) 16 MG tablet Take 8 mg by mouth daily       levothyroxine (SYNTHROID) 25 MCG tablet Take 25 mcg by mouth nightly       warfarin (COUMADIN) 6 MG tablet Take 7 mg by mouth daily [facility] manages pts coumadin      metoprolol (TOPROL-XL) 25 MG XL tablet Take 1 tablet by mouth 2 times daily. 60 tablet 3    magnesium oxide (MAG-OX) 400 (240 MG) MG tablet Take 1 tablet by mouth daily. (Patient taking differently: Take 250 mg by mouth daily ) 30 tablet 3     No current facility-administered medications for this visit. Allergies   Allergen Reactions    Pollen Extract      Other reaction(s): Other: See Comments denied respiratory problems  sneezing     ROS:   Constitutional: Negative for fever, activity change and appetite change. HENT: Negative for epistaxis. Eyes: Negative for diploplia, blurred vision. Respiratory: Negative for cough, chest tightness, shortness of breath and wheezing. Cardiovascular: pertinent positives in HPI  Gastrointestinal: Negative for abdominal pain and blood in stool.    All other review of systems are negative     PHYSICAL EXAM:  Vitals:    06/03/22 1303   BP: 102/62   Site: Left Upper Arm   Position: Sitting   Cuff Size: Medium Adult   Pulse: 66   Resp: 16   Weight: 210 lb 3.2 oz (95.3 kg)   Height: 6' (1.829 m)      Constitutional: Oriented to person, place, and time. Well-developed and cooperative. Head: Normocephalic and atraumatic. Cardiovascular:  Normal S1/ S2, Feet appear to be well perfused. Regular rhythm present. PMI is not displaced. Pulmonary/Chest: Effort normal and breath sounds normal. No respiratory distress. Musculoskeletal: Normal range of motion of all extremities, no muscle weakness. Neurological: Alert and oriented to person, place, and time. Gait normal.   Skin: Skin is warm and dry. No bruising, no ecchymosis and no rash noted. Extremity: No clubbing or cyanosis. No edema. Psychiatric: Normal mood and affect. Thought content normal.   PPM site: Left chest wall; site well healed. No erosion, infection or migration. TTE  4/15/2021  Findings      Left Ventricle   Normal left ventricle size and systolic function. Ejection fraction is visually estimated at 60-65%. No regional wall motion abnormalities seen. Mild concentric left ventricular hypertrophy. Stage III diastolic dysfunction. Right Ventricle   Mildly dilated right ventricle. Right ventricle global systolic function is normal . TAPSE 21 mm. Left Atrium   The left atrium is severely dilated. Right Atrium   Markedly enlarged right atrium size. Mitral Valve   Mild mitral annular calcification. No evidence of mitral valve stenosis. Moderate-to-severe mitral regurgitation with anteriorly directed jet. Tricuspid Valve   The tricuspid valve appears structurally normal.   Mild to moderate tricuspid regurgitation. RVSP is 37 mmHg. Normal estimated PA systolic pressure. Aortic Valve   Normally functioning bioprosthetic valve in aortic position.    Aortic chaitanya peak velocity 2 m/s, mean gradient 9 mmHg. DVI 0.35. ACT 90   ms. No evidence of aortic valve regurgitation. Pulmonic Valve   Pulmonic valve is structurally normal.   Physiologic and/or trace pulmonic regurgitation present. No evidence of pulmonic valve stenosis. Pericardial Effusion   No evidence for hemodynamically significant pericardial effusion. Pleural Effusion   No evidence of pleural effusion. Aorta   Mildly dilated aortic root (3.8 cm). .   Miscellaneous   The inferior vena cava diameter is normal with normal respiratory   variation. Conclusions      Summary   Normal left ventricle size and systolic function. Ejection fraction is visually estimated at 60-65%. No regional wall motion abnormalities seen. Mild concentric left ventricular hypertrophy. Stage III diastolic dysfunction. The left atrium is severely dilated. Mildly dilated right ventricle. Right ventricle global systolic function is normal . TAPSE 21 mm. Moderate-to-severe mitral regurgitation with anteriorly directed jet. Normally functioning bioprosthetic valve in aortic position. Aortic chaitanya peak velocity 2 m/s, mean gradient 9 mmHg. DVI 0.35. ACT 90   ms. No evidence of aortic valve regurgitation. Mild to moderate tricuspid regurgitation. RVSP is 37 mmHg. Normal estimated PA systolic pressure. No evidence for hemodynamically significant pericardial effusion. Consider REZA for further evaluation of mitral regurgitation.       Signature      ----------------------------------------------------------------   Electronically signed by Aurelio Galvan MD(Interpreting   physician) on 04/16/2021 09:19 PM   ----------------------------------------------------------------     M-Mode/2D Measurements & Calculations      LV Diastolic    LV Systolic Dimension: 3.6   AV Cusp Separation: 1.9 cmLA   Dimension: 4.9  cm                           Dimension: 5 cmAO Root   cm              LV Volume Diastolic: 307.0   Dimension: 3.8 cm LV FS:26.5 %    ml   LV PW           LV Volume Systolic: 84.5 ml   Diastolic: 1.2  LV EDV/LV EDV Index: 113.4   cm              ml/52 ml/m^2LV ESV/LV ESV    RV Diastolic Dimension: 3.5   LV PW Systolic: Index: 97.3 GM/66RW/ m^2     cm   1.7 cm          EF Calculated: 52 %   Septum          LV Mass Index: 122 l/min*m^2 LA/Aorta: 7.93   Diastolic: 1.5   cm                                           LA volume/Index: 110.8 ml   Septum          LVOT: 2.1 cm                 /53FF/Z^5   Systolic: 1.8                                RA Area: 34.1 cm^2   cm   CO: 4.14 l/min                               IVC Expiration: 1.8 cm   CI: 1.88   l/m*m^2   LV Mass: 267.94   g     Doppler Measurements & Calculations      MV Peak E-Wave: 1.45 AV Peak Velocity: 2    LVOT Peak Velocity: 0.73 m/s   m/s                  m/s                    LVOT Mean Velocity: 0.53 m/s   MV Peak A-Wave: 0.68 AV Peak Gradient:      LVOT Peak Gradient: 2.1   m/s                  16.03 mmHg             mmHgLVOT Mean Gradient: 1.2   MV E/A Ratio: 2.14   AV Mean Velocity: 1.39 mmHg   MV Peak Gradient:    m/s                    Estimated RVSP: 37.4 mmHg   10.5 mmHg            AV Mean Gradient: 8.6  Estimated RAP:3 mmHg   MV Mean Gradient:    mmHg   3.6 mmHg             AV VTI: 47.2 cm   MV Mean Velocity:    AV Area                TR Velocity:2.93 m/s   0.84 m/s             (Continuity):1.35 cm^2 TR Gradient:34.41 mmHg   MV Deceleration      AV Acceleration Time:  PV Peak Velocity: 0.61 m/s   Time: 179.3 msec     115.3 msec             PV Peak Gradient: 1.49 mmHg   MV P1/2t: 51.2 msec  LVOT VTI: 18.4 cm      PV Mean Velocity: 0.45 m/s   MVA by PHT:4.3 cm^2  IVRT: 51.9 msec        PV Mean Gradient: 0.9 mmHg   MV Area              Estimated PASP: 37.41   (continuity): 1.8    mmHg                   LA ED Velocity: 1.52 m/s   cm^2   MV E' Septal   Velocity: 0.05 m/s   MV E' Lateral   Velocity: 13 m/s   MR Velocity: 4.7 m/s   MR VTI: 158.4 cm    Pharmacologic Stress motion.     Impression:    1. ECG during the infusion did not change. 2. The myocardial perfusion imaging was normal with attenuation artifact. 3. Overall left ventricular systolic function was normal without regional wall motion abnormalities. 4. Low risk general pharmacologic stress test.      Device Interrogation: 6/3/22  Underlying rhythm:  Ap Vs  Make/Model:  [employer] Dual chamber PPM ( [employer])   Mode:  DDDR   Battery Voltage/Longevity:  Pacing: A: 93%  RV: 87%   P wave: 2.4 mV  Impedance: 410 ohms   Threshold: 0.75 V @ 0.5 ms  RV R wave: 4.5 mV  Impedance: 360 ohms   Threshold: 1.25 V @ 0.5 ms  Episodes: none  Reprogramming included: see below  Overall device function is normal    All device programmable settings were evaluated per above and in the scanned document, along with iterative adjustments (capture thresholds) to assess and select the most appropriate final programming to provide for consistent delivery of the appropriate therapy and to verify function of the device. EKG 06/03/22: A paced V sensed 66 bpm, QTc 422 msec. Assessment:     1. Paroxysmal atrial fibrillation   - History of.  - [id number]- VASc score of 3, on Coumadin; managed by PCP; denies any bleeding issues   - History of MAZE procedure in the past   - TTE 3/2017: LV EF 60%. - Receiving Toprol XL for rate control.  - AF burden 0%. - Re-education on importance of well controlled HTN (goal BP < 130/80), adequate weight control (goal BMI of < 27), physical activity consisting of moderate cardiopulmonary exercise up to a goal of 250 min/wk, daily compliance with CPAP in treating sleep apnea, smoking cessation and limited ETOH intake. 2. Pacemaker in situ   - Placed by Dr Afua Beck in January 2014. - [employer]; dual chamber.  - Compliant with remotes. - Normal device function.       3. Aortic valve replacement   - Bioprosthetic.  - Following with Cardiology.      4. History of pulmonary embolism  - Diagnosed in 2012.  - On Coumadin.     5. Hypertension  - Well controlled today. - On Atacand and Toprol XL.     6. COPD  - Management per PCP      7. Gout  - Management per PCP     8. Hypothyroidism  - On Synthroid. - Management per PCP. Plan:     1. Normal pacemaker function  2. Continue Toprol XL 25 mg bid  3. Continue Coumadin for goal INR 2-3, managed by PCP. 4. Remote transmission every 91 days   5. Follow-up in 1 year or sooner for symptoms. Encouraged the patient to call the office for any questions or concerns. Thank you very much to allowing me to participate in the patient's care. I have spent a total of 45 minutes with the patient and the family reviewing the above stated recommendations. And a total of >50% of that time involved face-to-face time providing counseling and/or coordination of care with the other providers, preparation for the clinic visit, reviewing records/tests, counseling/education of the patient, ordering medications/tests/procedures, coordinating care, and documenting clinical information in the EHR.      Ronel Santizo MD  Cardiac Electrophysiology  [address]   [facility]  6/3/22   1:22 PM

## 2022-06-03 ENCOUNTER — OFFICE VISIT (OUTPATIENT)
Dept: NON INVASIVE DIAGNOSTICS | Age: 87
End: 2022-06-03
Payer: MEDICARE

## 2022-06-03 VITALS
BODY MASS INDEX: 28.47 KG/M2 | WEIGHT: 210.2 LBS | HEIGHT: 72 IN | DIASTOLIC BLOOD PRESSURE: 62 MMHG | HEART RATE: 66 BPM | SYSTOLIC BLOOD PRESSURE: 102 MMHG | RESPIRATION RATE: 16 BRPM

## 2022-06-03 DIAGNOSIS — I48.0 PAROXYSMAL ATRIAL FIBRILLATION (HCC): ICD-10-CM

## 2022-06-03 DIAGNOSIS — I49.5 SSS (SICK SINUS SYNDROME) (HCC): ICD-10-CM

## 2022-06-03 DIAGNOSIS — Z95.0 CARDIAC PACEMAKER IN SITU: Primary | ICD-10-CM

## 2022-06-03 PROBLEM — I70.203 UNSPECIFIED ATHEROSCLEROSIS OF NATIVE ARTERIES OF EXTREMITIES, BILATERAL LEGS (HCC): Status: ACTIVE | Noted: 2022-06-03

## 2022-06-03 PROCEDURE — 1123F ACP DISCUSS/DSCN MKR DOCD: CPT | Performed by: INTERNAL MEDICINE

## 2022-06-03 PROCEDURE — 99204 OFFICE O/P NEW MOD 45 MIN: CPT | Performed by: INTERNAL MEDICINE

## 2022-06-03 RX ORDER — CHLORAL HYDRATE 500 MG
CAPSULE ORAL DAILY
COMMUNITY

## 2022-06-03 RX ORDER — OLMESARTAN MEDOXOMIL 20 MG/1
20 TABLET ORAL DAILY
COMMUNITY

## 2022-06-03 RX ORDER — CETIRIZINE HYDROCHLORIDE 10 MG/1
10 TABLET ORAL DAILY
COMMUNITY

## 2022-09-02 ENCOUNTER — OFFICE VISIT (OUTPATIENT)
Dept: CARDIOLOGY CLINIC | Age: 87
End: 2022-09-02
Payer: MEDICARE

## 2022-09-02 VITALS
HEIGHT: 72 IN | BODY MASS INDEX: 29.04 KG/M2 | RESPIRATION RATE: 18 BRPM | DIASTOLIC BLOOD PRESSURE: 78 MMHG | SYSTOLIC BLOOD PRESSURE: 112 MMHG | WEIGHT: 214.4 LBS | HEART RATE: 66 BPM

## 2022-09-02 DIAGNOSIS — I48.0 PAROXYSMAL ATRIAL FIBRILLATION (HCC): Primary | ICD-10-CM

## 2022-09-02 PROCEDURE — 1123F ACP DISCUSS/DSCN MKR DOCD: CPT | Performed by: INTERNAL MEDICINE

## 2022-09-02 PROCEDURE — 93000 ELECTROCARDIOGRAM COMPLETE: CPT | Performed by: INTERNAL MEDICINE

## 2022-09-02 PROCEDURE — 99214 OFFICE O/P EST MOD 30 MIN: CPT | Performed by: INTERNAL MEDICINE

## 2022-09-02 NOTE — PROGRESS NOTES
OUTPATIENT CARDIOLOGY FOLLOW-UP    Name: Humza Toledo    Age: 81 y.o. Primary Care Physician: Shima Gasca DO    Date of Service: 9/2/2022    Chief Complaint:   Chief Complaint   Patient presents with    Atrial Fibrillation     6 month OV- Patient has no complaints. Interim History:   Mr. Sanjeev Ivory is a 73-year-old gentleman with history of severe aortic stenosis underwent bioprosthetic AVR on 12/10/2013, also had a maze and left atrial clipping, hypertension and hypothyroidism on hormonal replacement therapy. He had a symptomatic sick sinus syndrome with a history of tachybradycardia syndrome and received Medtronic dual-chamber pacemaker, history of proximal atrial fibrillation with rapid ventricular response and a bilateral pulmonary emboli in July 2012, history of L1 fracture secondary to fall status post kyphoplasty is here for follow-up visit. He was last seen in the office was in to 2/1/22. Since his last visit, he has not had any ER visits or hospitalizations. He is having essential tremors and following with a neurologist at [facility]. He was on primidone for tremors which was discontinued. Currently he is taking Remeron 15 mg a day and he thinks it is not helping but feels a little better, has difficulty buttoning his shirt. No new cardiac complaints since last cardiology evaluation. Currently, he denies active chest pain. He denies SOB, palpitations, lightheadedness, dizziness, syncope, PND, or orthopnea. He is following with his primary care provider for INR monitoring and denies any bleeding complications including blood or black stools. SR on EKG.     Review of Systems:   Cardiac: As per HPI  General: No fever, chills  Pulmonary: As per HPI  HEENT: No visual disturbances, difficult swallowing  GI: No nausea, vomiting  Endocrine: No thyroid disease or DM  Musculoskeletal: BURNETT x 4, no focal motor deficits  Skin: Intact, no rashes  Neuro/Psych: No headache or seizures    Past Medical History:  Past Medical History:   Diagnosis Date    Anticoagulated on Coumadin     Anxiety     Arthritis     Atrial fibrillation ([address]     follows Dr. Jaimie Paul AUG 2014    BILATERAL, TEMPORAY,HAD 2 EPISODES LASTED 10-15 SECONDS / 2016 / no further episodes    Cancer ([address] 2013    skin    COPD (chronic obstructive pulmonary disease) (HCC)     Emphysema of lung (HCC)     mild    Environmental allergies     POLLEN    Essential tremor     head and hands    Fractured pelvis ([address] 1975    and fractured wrist after fall from tree    Frequency of urination     [place] ([place] of hearing)     no aides    Hx of blood clots 07/2012    after kyphoplasty,PE    Hypertension     Mitral regurgitation     per echo 4/15/2021/ moderate to severe    Pulmonary embolism ([address] 07/2012    after kyphoplasty    Right leg DVT (HCC)     SSS (sick sinus syndrome) ([address]     pacer in place / [facility]    Thyroid disease     Wears dentures     PARTIAL UPPER       Past Surgical History:  Past Surgical History:   Procedure Laterality Date    A-V CARDIAC PACEMAKER INSERTION Left 01/28/2014    AORTIC VALVE REPLACEMENT  12-    bioprosthesis for severe AS    CAPSULOTOMY, HAND  12/23/2013    Dr Jackeline Mendosa  11/25/2013    COLONOSCOPY      ECHO COMPL W DOP COLOR FLOW  7/26/2012         ECHOCARDIOGRAM TRANSESOPHAGEAL  12/14/2012         ECHOCARDIOGRAM TRANSESOPHAGEAL  12/28/2012         ECHOCARDIOGRAM TRANSESOPHAGEAL  12/23/2013         FIXATION KYPHOPLASTY  07/11/12    Kyphoplasty L1-L2, Bone BX, Insertion Epidural Cath with Steroid Injection    FRACTURE SURGERY  1975    wrist    JOINT REPLACEMENT Bilateral 2002 AND 2003    bilateral knee replacement    OTHER SURGICAL HISTORY  12/18/15    radiofrequency ablation r leg    PROSTATE BIOPSY  9/6/12    TRANSESOPHAGEAL ECHOCARDIOGRAM  12/28/2012    per Dr. Davis Vera. ..unable to cardiovert d/t clot in atrium    VARICOSE VEIN SURGERY Family History:  Family History   Problem Relation Age of Onset    COPD Mother        Social History:  Social History     Socioeconomic History    Marital status:      Spouse name: Not on file    Number of children: Not on file    Years of education: Not on file    Highest education level: Not on file   Occupational History    Not on file   Tobacco Use    Smoking status: Former     Packs/day: 0.30     Years: 2.00     Pack years: 0.60     Types: Cigarettes     Quit date: 1960     Years since quittin.7    Smokeless tobacco: Never   Vaping Use    Vaping Use: Never used   Substance and Sexual Activity    Alcohol use: Yes     Alcohol/week: 5.8 standard drinks     Comment: 3 drinks a week    Drug use: Never    Sexual activity: Not on file   Other Topics Concern    Not on file   Social History Narrative    ** Merged History Encounter **          Social Determinants of Health     Financial Resource Strain: Not on file   Food Insecurity: Not on file   Transportation Needs: Not on file   Physical Activity: Not on file   Stress: Not on file   Social Connections: Not on file   Intimate Partner Violence: Not on file   Housing Stability: Not on file       Allergies: Allergies   Allergen Reactions    Pollen Extract      Other reaction(s): Other: See Comments denied respiratory problems  sneezing       Current Medications:  Current Outpatient Medications   Medication Sig Dispense Refill    cetirizine (ZYRTEC) 10 MG tablet Take 10 mg by mouth daily      olmesartan (BENICAR) 20 MG tablet Take 20 mg by mouth daily      Omega-3 1000 MG CAPS Take by mouth daily      mirtazapine (REMERON) 15 MG tablet Take 15 mg by mouth nightly       gabapentin (NEURONTIN) 100 MG capsule 300 mg 3 times daily.        candesartan (ATACAND) 16 MG tablet Take 8 mg by mouth daily       levothyroxine (SYNTHROID) 25 MCG tablet Take 25 mcg by mouth nightly       warfarin (COUMADIN) 6 MG tablet Take 7 mg by mouth daily [facility] pts coumadin      metoprolol (TOPROL-XL) 25 MG XL tablet Take 1 tablet by mouth 2 times daily. 60 tablet 3    magnesium oxide (MAG-OX) 400 (240 MG) MG tablet Take 1 tablet by mouth daily. (Patient taking differently: Take 250 mg by mouth daily) 30 tablet 3     No current facility-administered medications for this visit. Physical Exam:  /78   Pulse 66   Resp 18   Ht 6' (1.829 m)   Wt 214 lb 6.4 oz (97.3 kg)   BMI 29.08 kg/m²   Wt Readings from Last 3 Encounters:   09/02/22 214 lb 6.4 oz (97.3 kg)   06/03/22 210 lb 3.2 oz (95.3 kg)   02/01/22 212 lb 11.2 oz (96.5 kg)     Appearance: Awake, alert and oriented x 3, no acute respiratory distress  Skin: Intact, no rash  Head: Normocephalic, atraumatic  Eyes: EOMI, no conjunctival erythema  ENMT: No pharyngeal erythema, MMM, no rhinorrhea  Neck: Supple, no elevated JVP, no carotid bruits  Lungs: Clear to auscultation bilaterally. No wheezes, rales, or rhonchi. Cardiac: Regular rate and rhythm, +S1S2, ESM 2/6 over the right upper sternal border, midsystolic murmur 3/6 heard over the left lower sternal border that increases with inspiration.   Abdomen: Soft, nontender, +bowel sounds  Extremities: Moves all extremities x 4 trace lower extremity edema  Neurologic: No focal motor deficits apparent, normal mood and affect, alert and oriented x 3  Peripheral Pulses: Intact posterior tibial pulses bilaterally    Laboratory Tests:  Lab Results   Component Value Date    CREATININE 0.8 12/18/2015    BUN 13 12/18/2015     12/18/2015    K 4.3 12/18/2015    CL 98 12/18/2015    CO2 30 (H) 12/18/2015     Lab Results   Component Value Date/Time    MG 2.1 09/25/2014 08:15 AM     Lab Results   Component Value Date    WBC 4.7 12/18/2015    HGB 14.3 12/18/2015    HCT 43.3 12/18/2015    MCV 93.0 12/18/2015     12/18/2015     Lab Results   Component Value Date    ALT 14 08/24/2014    AST 38 08/24/2014    ALKPHOS 70 08/24/2014    BILITOT 0.3 08/24/2014     Lab Results   Component Value Date    CKTOTAL 43 12/16/2014    CKMB 1.4 12/16/2014    TROPONINI <0.01 12/16/2014    TROPONINI <0.01 02/01/2014    TROPONINI 0.03 12/22/2013     Lab Results   Component Value Date    INR 1.4 12/18/2015    INR 2.3 12/16/2014    INR 2.8 09/25/2014    PROTIME 15.3 (H) 12/18/2015    PROTIME 25.7 (H) 12/16/2014    PROTIME 30.5 (H) 09/25/2014     Lab Results   Component Value Date    TSH 3.830 12/16/2014     Lab Results   Component Value Date    LABA1C 5.9 12/09/2013     No results found for: EAG  Lab Results   Component Value Date    CHOL 189 08/25/2014     Lab Results   Component Value Date    TRIG 135 08/25/2014     Lab Results   Component Value Date    HDL 35 08/25/2014     Lab Results   Component Value Date    LDLCALC 127 (H) 08/25/2014     Lab Results   Component Value Date    LABVLDL 27 08/25/2014     No results found for: CHOLHDLRATIO    Cardiac Tests:  ECG: AV sequential pacemaker with prolonged ND interval. No changes noted. Echocardiogram: 3/31/2017   Normal left ventricle size and systolic function   Ejection fraction is visually estimated at 60%. Mild left ventricular concentric hypertrophy noted. No wall motion abnormalities   The left atrium is severely dilated. Severely enlarged right atrium. Mild mitral regurgitation is present. Well seated bio prosthetic aortic valve # 29   Pacer wire visualized in right ventricle. TTE-5/15/2021:  Normal left ventricle size and systolic function. Ejection fraction is visually estimated at 60-65%. No regional wall motion abnormalities seen. Mild concentric left ventricular hypertrophy. Stage III diastolic dysfunction. The left atrium is severely dilated. Mildly dilated right ventricle. Right ventricle global systolic function is normal . TAPSE 21 mm. Moderate-to-severe mitral regurgitation with anteriorly directed jet. Normally functioning bioprosthetic valve in aortic position.    Aortic chaitanya peak velocity 2 m/s, mean gradient 9 mmHg. DVI 0.35. ACT 90   ms. No evidence of aortic valve regurgitation. Mild to moderate tricuspid regurgitation. RVSP is 37 mmHg. Normal estimated PA systolic pressure. No evidence for hemodynamically significant pericardial effusion. Consider REZA for further evaluation of mitral regurgitation. REZA-5/6/2021    Normal left ventricle size and systolic function. Ejection fraction is visually estimated at 60-65%. No regional wall motion abnormalities seen. Normal left ventricle wall thickness. Mildly dilated left atrium. Normal right ventricular size and function. Moderate mitral regurgitation with anteriorly directed jet. VC 0.6 cm, ERO   0.6 cm2, no PV flow reversal.   Normally functioning bioprosthetic valve in aortic position. No hemodynamically significant aortic stenosis is present. TAYLOR by direct   planimetry is 2.5 cm2. Mild tricuspid regurgitation. RVSP is 26 mmHg. Normal estimated PA systolic pressure. No evidence for hemodynamically significant pericardial effusion. Lexiscan nuclear stress test-8/3/2021  Impression:    ECG during the infusion did not change. The myocardial perfusion imaging was normal with attenuation artifact. Overall left ventricular systolic function was normal without regional wall motion abnormalities. Low risk general pharmacologic stress test.      Recent pacer evaluation done on 2/28/2020: Battery life 3 years, A pacing 70%, ventricular pacing 36%, mode switch 0%, AT AF burden 0%    Pacer evaluation-6/3/2022: Battery life 1.2 years, atrial pacing 93%, ventricular pacing 87%, mode switches 0%. No other arrhythmias noted. Pacer evaluation-12/2/2021  Implant Indication: AF, SSS, hx AVR:  Per medication list, pt taking Metoprolol S, Warfarin  Mode: DDDR LRL 65 bpm  Arrhythmias: None since 9/2021 routine report  Battery longevity 2 yrs:  Lead trends are stable and within normal limits  Presenting rhythm:  ApVp  HR Histograms show normal HR distribution  RVp 72% (VIP on, extension 50ms)    The ASCVD Risk score (Nohemi Minor., et al., 2013) failed to calculate for the following reasons: The 2013 ASCVD risk score is only valid for ages 36 to 78        ASSESSMENT:  Atypical chest pain , no further episode of chest pain, stress test showed no evidence of ischemia, no further episodes of chest pain  Paroxysmal atrial fibrillation now in sinus rhythm, AF burden 0% since his last pacer evaluation. Normally functioning bioprosthetic aortic valve, post bioprosthetic AVR, MAZE procedure,  PRO clipping-2013  VHD: Moderate mitral regurgitation  Hypertension on Benicar, well controlled blood pressure stable and well-controlled  Sick sinus syndrome status post DDDR, [facility] pacer implanted on 1/28/2014, battery life 1.2 years remaining  Hypothyroidism on hormone replacement therapy,  Benign essential tremors  Bilateral lower extremity edema, trace,stable     Plan:   Continue candesartan to 8 mg po daily. Continue current medications without any change. Continue anticoagulation therapy with warfarin and rate control with metoprolol. Follow up with pcp for INR monitoring. Pacer evaluation results reviewed and advised to follow up with EP service closely for possible generator change in the near future. Follow-up with me in 6 months     The patient's current medication list, allergies, problem list and results of all previously ordered testing were reviewed at today's visit.     Milind Christie MD  [facility]) Cardiology

## 2022-09-02 NOTE — PATIENT INSTRUCTIONS
Continue candesartan to 8 mg po daily. Continue current medications without any change. Continue anticoagulation therapy with warfarin and rate control with metoprolol. Follow up with pcp for INR monitoring. Pacer evaluation results reviewed and advised to follow up with EP service closely for possible generator change in the near future.   Follow-up with me in 6 months

## 2022-09-30 ENCOUNTER — TELEPHONE (OUTPATIENT)
Dept: NON INVASIVE DIAGNOSTICS | Age: 87
End: 2022-09-30

## 2022-09-30 NOTE — TELEPHONE ENCOUNTER
I left a message for Kimmie Hoang to contact the office. I do need verification I can speak with her as Yevonne Pippins is not listed in contact information. I left a message on patient's home voicemail to see if I can speak with Fern العلي. I will await call back.     Gita Bernal RN, BSN  [facility]

## 2022-09-30 NOTE — TELEPHONE ENCOUNTER
I called the patient's daughter, Julius Herman, back. Julius Herman let me speak with her father who gave me verbal permission to speak with his daughter, Julius Herman. Julius Herman wants her father to have an appointment with the EP providers following her father's discharge from [facility] ([facility] Julius Herman stated her father's discharge instructions stated he needs seen by his cardiology/pacemaker physician within 2 weeks for device generator change and dysrhythmias. I reviewed the [employer] Merlin [employer] device summary. The voltage is 2.71 (KD @ 2.6 V), estimated longevity is 3.7 months. The report does not have any episodes noted since 9/1/2022. This was our last remote transmission. He is having battery checks monthly. There were no arrhythmias on the 9/1/22 Merlin transmission. I told Julius Herman I will show the reports to Dr. Kristine Muir. I told her we do not bring the patients in for a generator change out until the device triggers KD. Julius Herman wants her father seen as soon as possible. I told her I will speak with her once I gather all the information for Dr Kristine Muir to review. Julius Herman was acceptable with this plan.     Isidro Alvarez RN, BSN  [facility]

## 2022-10-03 NOTE — TELEPHONE ENCOUNTER
I called Garo Eubanks, patient's daughter, and informed her the record from [facility] is reviewed. There were no dysrhythmias on Merlin report. Device has not triggered KD. I informed Garo Eubanks her father is due for another remote Merlin transmission on Monday, 10/10/22 (he is getting monthly battery checks). We will see what the remote shows. We will be in touch after Monday's remote.     Chinedu Hollingsworth RN, BSN  [facility]

## 2022-10-10 NOTE — TELEPHONE ENCOUNTER
There was not a remote Merlin transmission on Jordon's pacemaker this morning. I left a message for him to call the office so I can explain to him how to send a remote Merlin transmission. I will await patient's call.     Chinedu Hollingsworth RN, BSN  [facility]

## 2022-10-11 ENCOUNTER — TELEPHONE (OUTPATIENT)
Dept: NON INVASIVE DIAGNOSTICS | Age: 87
End: 2022-10-11

## 2022-10-11 NOTE — TELEPHONE ENCOUNTER
I notified daughter, Morena Becker transmission shows 3 month to KD, no arrhythmias noted. She voiced understanding.     Gita Bernal RN, BSN  [facility]

## 2023-02-10 ENCOUNTER — TELEPHONE (OUTPATIENT)
Dept: NON INVASIVE DIAGNOSTICS | Age: 88
End: 2023-02-10

## 2023-02-10 NOTE — TELEPHONE ENCOUNTER
Daughter, Elodia Cedillo, called. She wanted to know if her father would be able to use an induction stove. I gave her the number for [employer] to ask that question. She will call the company.       Damon Rodney RN, BSN  [facility]

## 2023-04-24 ENCOUNTER — TELEPHONE (OUTPATIENT)
Dept: NON INVASIVE DIAGNOSTICS | Age: 88
End: 2023-04-24

## 2023-04-24 DIAGNOSIS — Z95.0 PACEMAKER: Primary | ICD-10-CM

## 2023-04-24 NOTE — TELEPHONE ENCOUNTER
Patient will be contacted by coordinator to schedule procedure.      Electronically signed by Yanet Carr MA on 4/24/2023 at 10:43 AM

## 2023-04-24 NOTE — TELEPHONE ENCOUNTER
----- Message from Rayne Madrid RN sent at 4/24/2023 10:12 AM EDT -----  Jacque Rosas MD    Please schedule echo and then gen change. Hold Warfarin 5 days before the procedure. Thanks.

## 2023-04-25 ENCOUNTER — TELEPHONE (OUTPATIENT)
Dept: NON INVASIVE DIAGNOSTICS | Age: 88
End: 2023-04-25

## 2023-04-25 NOTE — TELEPHONE ENCOUNTER
----- Message from Mavis Stroud RN sent at 4/24/2023 10:12 AM EDT -----  Fallon Williamson MD    Please schedule echo and then gen change. Hold Warfarin 5 days before the procedure. Thanks.

## 2023-04-26 ENCOUNTER — TELEPHONE (OUTPATIENT)
Dept: NON INVASIVE DIAGNOSTICS | Age: 88
End: 2023-04-26

## 2023-04-26 NOTE — TELEPHONE ENCOUNTER
Left message for patient to call office to schedule echo and gen change.      Electronically signed by Kenia Hammond MA on 4/26/2023 at 8:15 AM

## 2023-04-28 ENCOUNTER — TELEPHONE (OUTPATIENT)
Dept: NON INVASIVE DIAGNOSTICS | Age: 88
End: 2023-04-28

## 2023-04-28 NOTE — TELEPHONE ENCOUNTER
Patient agreed to echo and gen change. Patient transferred to echo dept to be scheduled and will be contacted with procedure date once the echo is scheduled.     Electronically signed by Starr Franco MA on 4/28/2023 at 12:06 PM

## 2023-05-17 ENCOUNTER — TELEPHONE (OUTPATIENT)
Dept: NON INVASIVE DIAGNOSTICS | Age: 88
End: 2023-05-17

## 2023-05-17 NOTE — TELEPHONE ENCOUNTER
Please check prior auth.    06/01/2023    Kyle Farris PGR - [facility]    CPT: 85918  ICD: W96.414    Electronically signed by Loi Starr MA on 5/17/2023 at 1:14 PM

## 2023-05-18 NOTE — TELEPHONE ENCOUNTER
Approved through [address] [facility] #: [id number] from 05/18/2023 to 11/14/2023

## 2023-06-01 ENCOUNTER — TELEPHONE (OUTPATIENT)
Dept: NON INVASIVE DIAGNOSTICS | Age: 88
End: 2023-06-01

## 2023-06-01 ENCOUNTER — TELEPHONE (OUTPATIENT)
Dept: CARDIOLOGY | Age: 88
End: 2023-06-01

## 2023-06-01 NOTE — TELEPHONE ENCOUNTER
CALLED PATIENT AND LEFT MESSAGE TO RESCHEDULE ECHO THAT WAS SCHEDULED FOR THIS MORNING. PATIENT WAS A NO SHOW.     Electronically signed by Flo Noonan on 6/1/2023 at 9:46 AM

## 2023-06-02 ENCOUNTER — TELEPHONE (OUTPATIENT)
Dept: NON INVASIVE DIAGNOSTICS | Age: 88
End: 2023-06-02

## 2023-06-02 NOTE — TELEPHONE ENCOUNTER
Message left on wife's cell that patient procedure will be cancelled on 06/06/2023.     Electronically signed by Trudy Mayer MA on 6/2/2023 at 9:01 AM

## 2023-06-06 NOTE — TELEPHONE ENCOUNTER
Daughter, Jairo Wolff, called about patient's procedure. It was explained that several messages have been left with contact numbers in chart and there has not been any return calls. Jesica Liu states she is to be contacted instead of parents but she is not listed as a person we can speak with. Attempted to contact patient and spouse to get auth to speak with daughter but there was no answer and no return calls. Patient needs to have an echo scheduled and completed prior to getting a PGR but can't be reached.      Electronically signed by Kahtrin Shi MA on 6/6/2023 at 7:32 AM

## 2023-07-14 ENCOUNTER — HOSPITAL ENCOUNTER (OUTPATIENT)
Dept: CARDIOLOGY | Age: 88
Discharge: HOME | End: 2023-07-14
Payer: MEDICARE

## 2023-07-14 DIAGNOSIS — Z95.0 PACEMAKER: ICD-10-CM

## 2023-07-14 LAB
LV EF: 63 %
LVEF MODALITY: NORMAL

## 2023-07-14 PROCEDURE — 93306 TTE W/DOPPLER COMPLETE: CPT

## 2023-07-14 PROCEDURE — 93308 TTE F-UP OR LMTD: CPT

## 2023-07-18 ENCOUNTER — TELEPHONE (OUTPATIENT)
Dept: NON INVASIVE DIAGNOSTICS | Age: 88
End: 2023-07-18

## 2023-07-18 NOTE — TELEPHONE ENCOUNTER
----- Message from Deion Rudd MD sent at 7/18/2023  8:05 AM EDT -----  Echo showed normal LV EF. Please schedule gen change. Hold Coumadin 3 days before the procedure.  Thanks.  ----- Message -----  From: Aaron Hurst Incoming Cardiology Results From [facility]  Sent: 7/14/2023   6:55 PM EDT  To: Deion Rudd MD

## 2023-07-18 NOTE — TELEPHONE ENCOUNTER
Pt notified of results and verbalized understanding.     Electronically signed by Salena Antony MA on 7/18/2023 at 1:26 PM

## 2023-07-31 ENCOUNTER — TELEPHONE (OUTPATIENT)
Dept: NON INVASIVE DIAGNOSTICS | Age: 88
End: 2023-07-31

## 2023-08-01 ENCOUNTER — TELEPHONE (OUTPATIENT)
Dept: CARDIAC CATH/INVASIVE PROCEDURES | Age: 88
End: 2023-08-01

## 2023-08-02 ENCOUNTER — ANESTHESIA (OUTPATIENT)
Dept: CARDIAC CATH/INVASIVE PROCEDURES | Age: 88
End: 2023-08-02

## 2023-08-02 ENCOUNTER — HOSPITAL ENCOUNTER (OUTPATIENT)
Dept: GENERAL RADIOLOGY | Age: 88
Discharge: HOME | End: 2023-08-04
Payer: MEDICARE

## 2023-08-02 ENCOUNTER — ANESTHESIA EVENT (OUTPATIENT)
Dept: CARDIAC CATH/INVASIVE PROCEDURES | Age: 88
End: 2023-08-02

## 2023-08-02 ENCOUNTER — HOSPITAL ENCOUNTER (OUTPATIENT)
Dept: CARDIAC CATH/INVASIVE PROCEDURES | Age: 88
Discharge: HOME | End: 2023-08-02
Payer: MEDICARE

## 2023-08-02 VITALS
HEART RATE: 60 BPM | SYSTOLIC BLOOD PRESSURE: 176 MMHG | TEMPERATURE: 97.1 F | RESPIRATION RATE: 16 BRPM | DIASTOLIC BLOOD PRESSURE: 91 MMHG | OXYGEN SATURATION: 95 %

## 2023-08-02 PROBLEM — Z45.010 PACEMAKER AT END OF BATTERY LIFE: Status: ACTIVE | Noted: 2023-08-02

## 2023-08-02 LAB
ANION GAP SERPL CALCULATED.3IONS-SCNC: 7 MMOL/L (ref 7–16)
BASOPHILS # BLD: 0.04 K/UL (ref 0–0.2)
BASOPHILS NFR BLD: 1 % (ref 0–2)
BUN SERPL-MCNC: 23 MG/DL (ref 6–23)
CALCIUM SERPL-MCNC: 9.5 MG/DL (ref 8.6–10.2)
CHLORIDE SERPL-SCNC: 103 MMOL/L (ref 98–107)
CO2 SERPL-SCNC: 33 MMOL/L (ref 22–29)
CREAT SERPL-MCNC: 1.1 MG/DL (ref 0.7–1.2)
EOSINOPHIL # BLD: 0.38 K/UL (ref 0.05–0.5)
EOSINOPHILS RELATIVE PERCENT: 7 % (ref 0–6)
ERYTHROCYTE [DISTWIDTH] IN BLOOD BY AUTOMATED COUNT: 13.3 % (ref 11.5–15)
GFR SERPL CREATININE-BSD FRML MDRD: >60 ML/MIN/1.73M2
GLUCOSE SERPL-MCNC: 99 MG/DL (ref 74–99)
HCT VFR BLD AUTO: 41.8 % (ref 37–54)
HGB BLD-MCNC: 13.3 G/DL (ref 12.5–16.5)
IMM GRANULOCYTES # BLD AUTO: <0.03 K/UL (ref 0–0.58)
IMM GRANULOCYTES NFR BLD: 0 % (ref 0–5)
INR PPP: 2
LYMPHOCYTES NFR BLD: 1.33 K/UL (ref 1.5–4)
LYMPHOCYTES RELATIVE PERCENT: 23 % (ref 20–42)
MAGNESIUM SERPL-MCNC: 2 MG/DL (ref 1.6–2.6)
MCH RBC QN AUTO: 29.4 PG (ref 26–35)
MCHC RBC AUTO-ENTMCNC: 31.8 G/DL (ref 32–34.5)
MCV RBC AUTO: 92.5 FL (ref 80–99.9)
MONOCYTES NFR BLD: 0.47 K/UL (ref 0.1–0.95)
MONOCYTES NFR BLD: 8 % (ref 2–12)
NEUTROPHILS NFR BLD: 62 % (ref 43–80)
NEUTS SEG NFR BLD: 3.59 K/UL (ref 1.8–7.3)
PLATELET # BLD AUTO: 177 K/UL (ref 130–450)
PMV BLD AUTO: 9.1 FL (ref 7–12)
POTASSIUM SERPL-SCNC: 4.4 MMOL/L (ref 3.5–5)
PROTHROMBIN TIME: 21.6 SEC (ref 9.3–12.4)
RBC # BLD AUTO: 4.52 M/UL (ref 3.8–5.8)
SODIUM SERPL-SCNC: 143 MMOL/L (ref 132–146)
WBC OTHER # BLD: 5.8 K/UL (ref 4.5–11.5)

## 2023-08-02 PROCEDURE — 2580000003 HC RX 258

## 2023-08-02 PROCEDURE — 93005 ELECTROCARDIOGRAM TRACING: CPT | Performed by: INTERNAL MEDICINE

## 2023-08-02 PROCEDURE — 6360000002 HC RX W HCPCS

## 2023-08-02 PROCEDURE — C1889 IMPLANT/INSERT DEVICE, NOC: HCPCS

## 2023-08-02 PROCEDURE — 3700000000 HC ANESTHESIA ATTENDED CARE

## 2023-08-02 PROCEDURE — 71045 X-RAY EXAM CHEST 1 VIEW: CPT

## 2023-08-02 PROCEDURE — 33228 REMV&REPLC PM GEN DUAL LEAD: CPT | Performed by: INTERNAL MEDICINE

## 2023-08-02 PROCEDURE — 83735 ASSAY OF MAGNESIUM: CPT

## 2023-08-02 PROCEDURE — 85025 COMPLETE CBC W/AUTO DIFF WBC: CPT

## 2023-08-02 PROCEDURE — 80048 BASIC METABOLIC PNL TOTAL CA: CPT

## 2023-08-02 PROCEDURE — 3700000001 HC ADD 15 MINUTES (ANESTHESIA)

## 2023-08-02 PROCEDURE — 33228 REMV&REPLC PM GEN DUAL LEAD: CPT

## 2023-08-02 PROCEDURE — C1785 PMKR, DUAL, RATE-RESP: HCPCS

## 2023-08-02 PROCEDURE — 85610 PROTHROMBIN TIME: CPT

## 2023-08-02 PROCEDURE — 2500000003 HC RX 250 WO HCPCS

## 2023-08-02 PROCEDURE — 2720000010 HC SURG SUPPLY STERILE

## 2023-08-02 PROCEDURE — 2709999900 HC NON-CHARGEABLE SUPPLY

## 2023-08-02 RX ORDER — CEFAZOLIN SODIUM 1 G/3ML
INJECTION, POWDER, FOR SOLUTION INTRAMUSCULAR; INTRAVENOUS PRN
Status: DISCONTINUED | OUTPATIENT
Start: 2023-08-02 | End: 2023-08-02 | Stop reason: SDUPTHER

## 2023-08-02 RX ORDER — FENTANYL CITRATE 50 UG/ML
INJECTION, SOLUTION INTRAMUSCULAR; INTRAVENOUS PRN
Status: DISCONTINUED | OUTPATIENT
Start: 2023-08-02 | End: 2023-08-02 | Stop reason: SDUPTHER

## 2023-08-02 RX ORDER — PROPOFOL 10 MG/ML
INJECTION, EMULSION INTRAVENOUS CONTINUOUS PRN
Status: DISCONTINUED | OUTPATIENT
Start: 2023-08-02 | End: 2023-08-02 | Stop reason: SDUPTHER

## 2023-08-02 RX ORDER — SODIUM CHLORIDE 9 MG/ML
INJECTION, SOLUTION INTRAVENOUS CONTINUOUS PRN
Status: DISCONTINUED | OUTPATIENT
Start: 2023-08-02 | End: 2023-08-02 | Stop reason: SDUPTHER

## 2023-08-02 RX ORDER — SODIUM CHLORIDE 0.9 % (FLUSH) 0.9 %
5-40 SYRINGE (ML) INJECTION PRN
Status: DISCONTINUED | OUTPATIENT
Start: 2023-08-02 | End: 2023-08-03 | Stop reason: HOSPADM

## 2023-08-02 RX ORDER — ONDANSETRON 2 MG/ML
4 INJECTION INTRAMUSCULAR; INTRAVENOUS EVERY 6 HOURS PRN
Status: DISCONTINUED | OUTPATIENT
Start: 2023-08-02 | End: 2023-08-03 | Stop reason: HOSPADM

## 2023-08-02 RX ORDER — MIDAZOLAM HYDROCHLORIDE 1 MG/ML
INJECTION INTRAMUSCULAR; INTRAVENOUS PRN
Status: DISCONTINUED | OUTPATIENT
Start: 2023-08-02 | End: 2023-08-02 | Stop reason: SDUPTHER

## 2023-08-02 RX ORDER — SODIUM CHLORIDE 9 MG/ML
INJECTION, SOLUTION INTRAVENOUS PRN
Status: DISCONTINUED | OUTPATIENT
Start: 2023-08-02 | End: 2023-08-03 | Stop reason: HOSPADM

## 2023-08-02 RX ORDER — SODIUM CHLORIDE 0.9 % (FLUSH) 0.9 %
5-40 SYRINGE (ML) INJECTION EVERY 12 HOURS SCHEDULED
Status: DISCONTINUED | OUTPATIENT
Start: 2023-08-02 | End: 2023-08-03 | Stop reason: HOSPADM

## 2023-08-02 RX ORDER — DOXYCYCLINE HYCLATE 100 MG
100 TABLET ORAL 2 TIMES DAILY
Qty: 14 TABLET | Refills: 0 | Status: SHIPPED | OUTPATIENT
Start: 2023-08-02 | End: 2023-08-09

## 2023-08-02 RX ADMIN — SODIUM CHLORIDE: 9 INJECTION, SOLUTION INTRAVENOUS at 11:40

## 2023-08-02 RX ADMIN — PROPOFOL 15 MCG/KG/MIN: 10 INJECTION, EMULSION INTRAVENOUS at 11:45

## 2023-08-02 RX ADMIN — FENTANYL CITRATE 50 MCG: 50 INJECTION, SOLUTION INTRAMUSCULAR; INTRAVENOUS at 11:45

## 2023-08-02 RX ADMIN — CEFAZOLIN 2 G: 1 INJECTION, POWDER, FOR SOLUTION INTRAMUSCULAR; INTRAVENOUS at 11:52

## 2023-08-02 RX ADMIN — MIDAZOLAM 1 MG: 1 INJECTION INTRAMUSCULAR; INTRAVENOUS at 11:45

## 2023-08-02 ASSESSMENT — LIFESTYLE VARIABLES: SMOKING_STATUS: 0

## 2023-08-02 NOTE — H&P
Electrophysiology History and Physical Examination    Reymundo Litten  1935  Date of Service: 8/2/2023  Referring Provider/PCP: Synthia Eisenmenger, DO  Primary Electrophysiologist: Shira Espinoza MD    Chief Complaint: Pacemaker management     SUBJECTIVE: Reymundo Litten presents to the office today for a overdue office follow -up. He was last seen on 2/21/2019 and has not been seen since. The patient currently feels well and offers no complaints from a device POV. The device site looks well healed and free from infection or erosion. The patient denies any chest pain, dyspnea, palpitations, dizziness, syncope, orthopnea or paroxysmal nocturnal dyspnea. 8/2/23: The patient is seen in the hospital for elective pacemaker pulse generator change. KD since 4/12/23. [place]ott pulse generator and Medtronic MRI compatible leads. Patient and family agree to switch generator to Medtronic to make the system MRI compatible. Echocardiogram showed LV EF 60-65%. Risks, benefits, and alternatives of pacemaker generator replacement were discussed in detail today. These risks include but are not limited to bleeding,infection, lead damage or discovery of a non-functional lead which would require lead revision and risks of blood clot, pneumothorax, hemothorax, cardiac perforation and tamponade, lead dislodgement, contrast induced nephropathy leading to short or even long term dialysis, vascular injury requiring emergent surgical repair, stroke and even death. The patient understands these risks and agrees to proceed today.     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

## 2023-08-02 NOTE — ANESTHESIA POSTPROCEDURE EVALUATION
Department of Anesthesiology  Postprocedure Note    Patient: Echo Nice  MRN: [id number]  YOB: 1935  Date of evaluation: 8/2/2023      Procedure Summary     Date: 08/02/23 [place] [facility] [place] [facility] [place]    Anesthesia Start: 8765 Anesthesia Stop: 6537    Procedure: PACEMAKER GENERATOR CHANGE W ANESTHESIA Diagnosis: Encounter for adjustment and management of other part of cardiac pacemaker    Scheduled Providers: CROW Galvan - CRNA;  Yonny Wells MD Responsible Provider: Yonny Wells MD    Anesthesia Type: MAC ASA Status: 3          Anesthesia Type: MAC    Molly Phase I:      Molly Phase II:        Anesthesia Post Evaluation    Patient location during evaluation: PACU  Patient participation: complete - patient participated  Level of consciousness: awake  Pain score: 0  Airway patency: patent  Nausea & Vomiting: no nausea  Complications: no  Cardiovascular status: hemodynamically stable  Respiratory status: acceptable  Hydration status: stable  Multimodal analgesia pain management approach

## 2023-08-02 NOTE — OP NOTE
[address] [facility] Electrophysiology  Procedure Report  PATIENT: [address] [facility] RECORD NUMBER: [id number]  DATE OF PROCEDURE:  8/2/2023  ATTENDING ELECTROPHYSIOLOGIST:  Sera Sotelo MD  REFERRING PHYSICIAN: Dr. Dameon Calvillo    Procedure Performed:  1. Generator Replacement of a  Dual Chamber Permanent Pacemaker (Medtronic)    Indication for Procedure:  1. Pacemaker pulse generator at elective replacement interval    Flouroscopy: 0 min  Complications: none immediately apparent  EBL: minimal  Specimens: none  Contrast: 0 cc    FINDINGS:  Implanted device information:  1. New Pulse Generator is a Medtronic. Serial # [id number]  Placement: Left pectoral subcutaneous  Date of implant: 8/2/2023  2. Old Pulse Generator is an Abbott. Serial # [id number]  Placement: Left pectoral subcutaneous  Date of explant: 8/2/2023  Date of implant: 1/28/2014  3. Right atrial lead parameters are as follows:  Medtronic  Model # [id number]. Serial # [id number]  Lead position: RA  Date of implant: 1/28/2014  P-waves: 1.8 mV  Pacing threshold: 0.75 V at 0.4 ms. Impedance: 437 ohms. 4. Right ventricular lead parameters are as follows:  Medtronic  Model [id number]. Serial # [id number]  Lead position: RV  Date of implant: 1/28/2014  R-waves: 4.4 mV  Pacing threshold: 1.0 V at 0.4 ms. Impedance: 399 ohms. 5. Bradycardia parameters:  Mode: AAIR <-> DDDR  Base Rate: 60  AV delay:  340/350  Max Tracking Rate: 130    DETAILS OF THE OPERATION: The risks, benefits, alternatives of the procedure were explained to patient and family. They consented and agreed to proceed. Written consent was obtained in the chart. Blood products are not routinely needed for such procedures. The patient was brought to the Electrophysiology lab in a fasting and non-sedated state. The patient had electrocardiographic and hemodynamic monitoring equipment placed.  During the case the patient was under the

## 2023-08-03 LAB
EKG ATRIAL RATE: 60 BPM
EKG P AXIS: 89 DEGREES
EKG P-R INTERVAL: 394 MS
EKG Q-T INTERVAL: 452 MS
EKG QRS DURATION: 110 MS
EKG QTC CALCULATION (BAZETT): 452 MS
EKG R AXIS: 37 DEGREES
EKG T AXIS: 22 DEGREES
EKG VENTRICULAR RATE: 60 BPM

## 2023-08-16 ENCOUNTER — NURSE ONLY (OUTPATIENT)
Dept: NON INVASIVE DIAGNOSTICS | Age: 88
End: 2023-08-16
Payer: MEDICARE

## 2023-08-16 DIAGNOSIS — Z95.0 CARDIAC PACEMAKER IN SITU: ICD-10-CM

## 2023-08-16 DIAGNOSIS — I49.5 SSS (SICK SINUS SYNDROME) (HCC): Primary | ICD-10-CM

## 2023-08-16 PROCEDURE — 93280 PM DEVICE PROGR EVAL DUAL: CPT | Performed by: INTERNAL MEDICINE

## 2023-08-16 NOTE — PATIENT INSTRUCTIONS
You may shower starting now    Call if any signs or symptoms of infection [phone] ext: [phone]  Fevers, chills, redness, swelling or drainage.        Hook up home  Monitor  Textingly Stay connected: [phone]

## 2023-09-09 PROCEDURE — 93296 REM INTERROG EVL PM/IDS: CPT | Performed by: INTERNAL MEDICINE

## 2023-09-09 PROCEDURE — 93294 REM INTERROG EVL PM/LDLS PM: CPT | Performed by: INTERNAL MEDICINE

## 2023-11-17 ENCOUNTER — HOSPITAL ENCOUNTER (OUTPATIENT)
Dept: GENERAL RADIOLOGY | Age: 88
End: 2023-11-17
Attending: FAMILY MEDICINE
Payer: MEDICARE

## 2023-11-17 ENCOUNTER — HOSPITAL ENCOUNTER (OUTPATIENT)
Age: 88
End: 2023-11-17
Payer: MEDICARE

## 2023-11-17 DIAGNOSIS — M25.562 LEFT KNEE PAIN, UNSPECIFIED CHRONICITY: ICD-10-CM

## 2023-11-17 DIAGNOSIS — M25.561 RIGHT KNEE PAIN, UNSPECIFIED CHRONICITY: ICD-10-CM

## 2023-11-17 PROCEDURE — 73564 X-RAY EXAM KNEE 4 OR MORE: CPT

## 2024-03-07 ENCOUNTER — TELEPHONE (OUTPATIENT)
Dept: ADMINISTRATIVE | Age: 89
End: 2024-03-07

## 2024-03-07 NOTE — TELEPHONE ENCOUNTER
Pt's daughter Martha calling for HFU apt/timing with Dr. Garcia dx: CHF discharged on Tue from [facility].  She is req, March 12th same day as PCP apt - before or after 12:30 - they are coming from [facility].  [phone].

## 2024-03-07 NOTE — TELEPHONE ENCOUNTER
Spoke with patient's daughter (Martha).  Advised her there is no availability on 3/12/24.  Please advise regarding hospital F/U.

## 2024-03-08 NOTE — TELEPHONE ENCOUNTER
Patient's last visit with me on 9/2/2022, he can see Janice or Miesha if they are available next week if not, he can follow-up with me in 1 to 2 weeks.

## 2024-03-11 NOTE — TELEPHONE ENCOUNTER
There were two cancellations for 3/12/24.  Spoke with patient's daughter (Martha), F./U scheduled for 3/12/24 at 3:00 p.m.

## 2024-03-12 ENCOUNTER — APPOINTMENT (OUTPATIENT)
Dept: CT IMAGING | Age: 89
End: 2024-03-12
Payer: MEDICARE

## 2024-03-12 ENCOUNTER — TELEPHONE (OUTPATIENT)
Dept: ADMINISTRATIVE | Age: 89
End: 2024-03-12

## 2024-03-12 ENCOUNTER — HOSPITAL ENCOUNTER (INPATIENT)
Age: 89
LOS: 2 days | Discharge: HOME | End: 2024-03-14
Attending: EMERGENCY MEDICINE | Admitting: FAMILY MEDICINE
Payer: MEDICARE

## 2024-03-12 ENCOUNTER — APPOINTMENT (OUTPATIENT)
Dept: GENERAL RADIOLOGY | Age: 89
End: 2024-03-12
Payer: MEDICARE

## 2024-03-12 DIAGNOSIS — R42 LIGHTHEADED: ICD-10-CM

## 2024-03-12 DIAGNOSIS — Z79.01 ANTICOAGULATED ON COUMADIN: ICD-10-CM

## 2024-03-12 DIAGNOSIS — D64.9 SYMPTOMATIC ANEMIA: Primary | ICD-10-CM

## 2024-03-12 LAB
ABO + RH BLD: NORMAL
ALBUMIN SERPL-MCNC: 3.4 G/DL (ref 3.5–5.2)
ALP SERPL-CCNC: 90 U/L (ref 40–129)
ALT SERPL-CCNC: 13 U/L (ref 0–40)
ANION GAP SERPL CALCULATED.3IONS-SCNC: 7 MMOL/L (ref 7–16)
ARM BAND NUMBER: NORMAL
AST SERPL-CCNC: 20 U/L (ref 0–39)
BASOPHILS # BLD: 0.01 K/UL (ref 0–0.2)
BASOPHILS NFR BLD: 0 % (ref 0–2)
BILIRUB SERPL-MCNC: 0.3 MG/DL (ref 0–1.2)
BLOOD BANK SAMPLE EXPIRATION: NORMAL
BLOOD GROUP ANTIBODIES SERPL: NEGATIVE
BNP SERPL-MCNC: 2291 PG/ML (ref 0–450)
BUN SERPL-MCNC: 22 MG/DL (ref 6–23)
CALCIUM SERPL-MCNC: 8.4 MG/DL (ref 8.6–10.2)
CHLORIDE SERPL-SCNC: 105 MMOL/L (ref 98–107)
CO2 SERPL-SCNC: 29 MMOL/L (ref 22–29)
CREAT SERPL-MCNC: 1.1 MG/DL (ref 0.7–1.2)
EOSINOPHIL # BLD: 0.35 K/UL (ref 0.05–0.5)
EOSINOPHILS RELATIVE PERCENT: 7 % (ref 0–6)
ERYTHROCYTE [DISTWIDTH] IN BLOOD BY AUTOMATED COUNT: 14.1 % (ref 11.5–15)
GFR SERPL CREATININE-BSD FRML MDRD: >60 ML/MIN/1.73M2
GLUCOSE SERPL-MCNC: 99 MG/DL (ref 74–99)
HCT VFR BLD AUTO: 25.9 % (ref 37–54)
HCT VFR BLD AUTO: 27.2 % (ref 37–54)
HGB BLD-MCNC: 7.9 G/DL (ref 12.5–16.5)
HGB BLD-MCNC: 8.1 G/DL (ref 12.5–16.5)
IMM GRANULOCYTES # BLD AUTO: <0.03 K/UL (ref 0–0.58)
IMM GRANULOCYTES NFR BLD: 0 % (ref 0–5)
INR PPP: 3.3
LACTATE BLDV-SCNC: 1 MMOL/L (ref 0.5–2.2)
LIPASE SERPL-CCNC: 16 U/L (ref 13–60)
LYMPHOCYTES NFR BLD: 1.6 K/UL (ref 1.5–4)
LYMPHOCYTES RELATIVE PERCENT: 30 % (ref 20–42)
MCH RBC QN AUTO: 26.9 PG (ref 26–35)
MCHC RBC AUTO-ENTMCNC: 29.8 G/DL (ref 32–34.5)
MCV RBC AUTO: 90.4 FL (ref 80–99.9)
MONOCYTES NFR BLD: 0.4 K/UL (ref 0.1–0.95)
MONOCYTES NFR BLD: 8 % (ref 2–12)
NEUTROPHILS NFR BLD: 55 % (ref 43–80)
NEUTS SEG NFR BLD: 2.95 K/UL (ref 1.8–7.3)
PLATELET # BLD AUTO: 198 K/UL (ref 130–450)
PMV BLD AUTO: 9 FL (ref 7–12)
POTASSIUM SERPL-SCNC: 3.7 MMOL/L (ref 3.5–5)
PROT SERPL-MCNC: 7 G/DL (ref 6.4–8.3)
PROTHROMBIN TIME: 35.9 SEC (ref 9.3–12.4)
RBC # BLD AUTO: 3.01 M/UL (ref 3.8–5.8)
SODIUM SERPL-SCNC: 141 MMOL/L (ref 132–146)
TROPONIN I SERPL HS-MCNC: 16 NG/L (ref 0–11)
TROPONIN I SERPL HS-MCNC: 18 NG/L (ref 0–11)
TROPONIN I SERPL HS-MCNC: 19 NG/L (ref 0–11)
WBC OTHER # BLD: 5.3 K/UL (ref 4.5–11.5)

## 2024-03-12 PROCEDURE — 99285 EMERGENCY DEPT VISIT HI MDM: CPT

## 2024-03-12 PROCEDURE — 83880 ASSAY OF NATRIURETIC PEPTIDE: CPT

## 2024-03-12 PROCEDURE — 85610 PROTHROMBIN TIME: CPT

## 2024-03-12 PROCEDURE — 2060000000 HC ICU INTERMEDIATE R&B

## 2024-03-12 PROCEDURE — 86900 BLOOD TYPING SEROLOGIC ABO: CPT

## 2024-03-12 PROCEDURE — 80053 COMPREHEN METABOLIC PANEL: CPT

## 2024-03-12 PROCEDURE — 6370000000 HC RX 637 (ALT 250 FOR IP): Performed by: NURSE PRACTITIONER

## 2024-03-12 PROCEDURE — 83605 ASSAY OF LACTIC ACID: CPT

## 2024-03-12 PROCEDURE — 86850 RBC ANTIBODY SCREEN: CPT

## 2024-03-12 PROCEDURE — 71045 X-RAY EXAM CHEST 1 VIEW: CPT

## 2024-03-12 PROCEDURE — 96374 THER/PROPH/DIAG INJ IV PUSH: CPT

## 2024-03-12 PROCEDURE — 2580000003 HC RX 258: Performed by: NURSE PRACTITIONER

## 2024-03-12 PROCEDURE — 85025 COMPLETE CBC W/AUTO DIFF WBC: CPT

## 2024-03-12 PROCEDURE — 6360000002 HC RX W HCPCS: Performed by: STUDENT IN AN ORGANIZED HEALTH CARE EDUCATION/TRAINING PROGRAM

## 2024-03-12 PROCEDURE — 85014 HEMATOCRIT: CPT

## 2024-03-12 PROCEDURE — 84484 ASSAY OF TROPONIN QUANT: CPT

## 2024-03-12 PROCEDURE — C9113 INJ PANTOPRAZOLE SODIUM, VIA: HCPCS | Performed by: STUDENT IN AN ORGANIZED HEALTH CARE EDUCATION/TRAINING PROGRAM

## 2024-03-12 PROCEDURE — 93005 ELECTROCARDIOGRAM TRACING: CPT | Performed by: STUDENT IN AN ORGANIZED HEALTH CARE EDUCATION/TRAINING PROGRAM

## 2024-03-12 PROCEDURE — 83690 ASSAY OF LIPASE: CPT

## 2024-03-12 PROCEDURE — 85018 HEMOGLOBIN: CPT

## 2024-03-12 PROCEDURE — 86901 BLOOD TYPING SEROLOGIC RH(D): CPT

## 2024-03-12 PROCEDURE — 70450 CT HEAD/BRAIN W/O DYE: CPT

## 2024-03-12 RX ORDER — LEVOTHYROXINE SODIUM 0.03 MG/1
25 TABLET ORAL NIGHTLY
Status: DISCONTINUED | OUTPATIENT
Start: 2024-03-12 | End: 2024-03-14 | Stop reason: HOSPADM

## 2024-03-12 RX ORDER — GABAPENTIN 400 MG/1
400 CAPSULE ORAL 4 TIMES DAILY
Status: DISCONTINUED | OUTPATIENT
Start: 2024-03-12 | End: 2024-03-14 | Stop reason: HOSPADM

## 2024-03-12 RX ORDER — SODIUM CHLORIDE 0.9 % (FLUSH) 0.9 %
5-40 SYRINGE (ML) INJECTION EVERY 12 HOURS SCHEDULED
Status: DISCONTINUED | OUTPATIENT
Start: 2024-03-12 | End: 2024-03-14 | Stop reason: HOSPADM

## 2024-03-12 RX ORDER — ACETAMINOPHEN 325 MG/1
650 TABLET ORAL EVERY 6 HOURS PRN
Status: DISCONTINUED | OUTPATIENT
Start: 2024-03-12 | End: 2024-03-14 | Stop reason: HOSPADM

## 2024-03-12 RX ORDER — SODIUM CHLORIDE 0.9 % (FLUSH) 0.9 %
5-40 SYRINGE (ML) INJECTION PRN
Status: DISCONTINUED | OUTPATIENT
Start: 2024-03-12 | End: 2024-03-14 | Stop reason: HOSPADM

## 2024-03-12 RX ORDER — METRONIDAZOLE 500 MG/1
250 TABLET ORAL 4 TIMES DAILY
Status: DISCONTINUED | OUTPATIENT
Start: 2024-03-12 | End: 2024-03-14 | Stop reason: HOSPADM

## 2024-03-12 RX ORDER — PROPRANOLOL HCL 60 MG
60 CAPSULE, EXTENDED RELEASE 24HR ORAL DAILY
COMMUNITY

## 2024-03-12 RX ORDER — PANTOPRAZOLE SODIUM 40 MG/10ML
40 INJECTION, POWDER, LYOPHILIZED, FOR SOLUTION INTRAVENOUS ONCE
Status: COMPLETED | OUTPATIENT
Start: 2024-03-12 | End: 2024-03-12

## 2024-03-12 RX ORDER — ONDANSETRON 4 MG/1
4 TABLET, ORALLY DISINTEGRATING ORAL EVERY 8 HOURS PRN
Status: DISCONTINUED | OUTPATIENT
Start: 2024-03-12 | End: 2024-03-14 | Stop reason: HOSPADM

## 2024-03-12 RX ORDER — SODIUM CHLORIDE 9 MG/ML
INJECTION, SOLUTION INTRAVENOUS PRN
Status: DISCONTINUED | OUTPATIENT
Start: 2024-03-12 | End: 2024-03-14 | Stop reason: HOSPADM

## 2024-03-12 RX ORDER — ATORVASTATIN CALCIUM 20 MG/1
20 TABLET, FILM COATED ORAL NIGHTLY
COMMUNITY

## 2024-03-12 RX ORDER — LANOLIN ALCOHOL/MO/W.PET/CERES
400 CREAM (GRAM) TOPICAL DAILY
Status: DISCONTINUED | OUTPATIENT
Start: 2024-03-13 | End: 2024-03-14 | Stop reason: HOSPADM

## 2024-03-12 RX ORDER — BISMUTH SUBSALICYLATE 262 MG/1
524 TABLET, CHEWABLE ORAL
Status: DISCONTINUED | OUTPATIENT
Start: 2024-03-12 | End: 2024-03-12 | Stop reason: CLARIF

## 2024-03-12 RX ORDER — ESCITALOPRAM OXALATE 20 MG/1
20 TABLET ORAL DAILY
COMMUNITY

## 2024-03-12 RX ORDER — METRONIDAZOLE 250 MG/1
250 TABLET ORAL 4 TIMES DAILY
Status: ON HOLD | COMMUNITY
End: 2024-03-14 | Stop reason: HOSPADM

## 2024-03-12 RX ORDER — ONDANSETRON 2 MG/ML
4 INJECTION INTRAMUSCULAR; INTRAVENOUS EVERY 6 HOURS PRN
Status: DISCONTINUED | OUTPATIENT
Start: 2024-03-12 | End: 2024-03-14 | Stop reason: HOSPADM

## 2024-03-12 RX ORDER — WARFARIN SODIUM 2.5 MG/1
2.5 TABLET ORAL EVERY OTHER DAY
Status: ON HOLD | COMMUNITY
End: 2024-03-14

## 2024-03-12 RX ORDER — PANTOPRAZOLE SODIUM 40 MG/1
40 TABLET, DELAYED RELEASE ORAL 2 TIMES DAILY
COMMUNITY

## 2024-03-12 RX ORDER — BISACODYL 10 MG
10 SUPPOSITORY, RECTAL RECTAL DAILY PRN
Status: DISCONTINUED | OUTPATIENT
Start: 2024-03-12 | End: 2024-03-14 | Stop reason: HOSPADM

## 2024-03-12 RX ORDER — DOXYCYCLINE HYCLATE 100 MG
100 TABLET ORAL 4 TIMES DAILY
COMMUNITY

## 2024-03-12 RX ORDER — ESCITALOPRAM OXALATE 10 MG/1
20 TABLET ORAL DAILY
Status: DISCONTINUED | OUTPATIENT
Start: 2024-03-13 | End: 2024-03-14 | Stop reason: HOSPADM

## 2024-03-12 RX ORDER — ATORVASTATIN CALCIUM 20 MG/1
20 TABLET, FILM COATED ORAL NIGHTLY
Status: DISCONTINUED | OUTPATIENT
Start: 2024-03-12 | End: 2024-03-14 | Stop reason: HOSPADM

## 2024-03-12 RX ORDER — ACETAMINOPHEN 650 MG/1
650 SUPPOSITORY RECTAL EVERY 6 HOURS PRN
Status: DISCONTINUED | OUTPATIENT
Start: 2024-03-12 | End: 2024-03-14 | Stop reason: HOSPADM

## 2024-03-12 RX ORDER — PROPRANOLOL HCL 60 MG
60 CAPSULE, EXTENDED RELEASE 24HR ORAL DAILY
Status: DISCONTINUED | OUTPATIENT
Start: 2024-03-13 | End: 2024-03-14 | Stop reason: HOSPADM

## 2024-03-12 RX ORDER — DOCUSATE SODIUM 100 MG/1
100 CAPSULE, LIQUID FILLED ORAL 2 TIMES DAILY
Status: DISCONTINUED | OUTPATIENT
Start: 2024-03-12 | End: 2024-03-14 | Stop reason: HOSPADM

## 2024-03-12 RX ORDER — BISMUTH SUBSALICYLATE 262 MG/1
524 TABLET, CHEWABLE ORAL
COMMUNITY

## 2024-03-12 RX ORDER — DOCUSATE SODIUM 100 MG/1
100 CAPSULE, LIQUID FILLED ORAL 2 TIMES DAILY
COMMUNITY

## 2024-03-12 RX ADMIN — ATORVASTATIN CALCIUM 20 MG: 20 TABLET, FILM COATED ORAL at 21:45

## 2024-03-12 RX ADMIN — METRONIDAZOLE 250 MG: 500 TABLET ORAL at 21:45

## 2024-03-12 RX ADMIN — LEVOTHYROXINE SODIUM 25 MCG: 25 TABLET ORAL at 21:45

## 2024-03-12 RX ADMIN — GABAPENTIN 400 MG: 400 CAPSULE ORAL at 21:45

## 2024-03-12 RX ADMIN — DOCUSATE SODIUM 100 MG: 100 CAPSULE, LIQUID FILLED ORAL at 21:45

## 2024-03-12 RX ADMIN — BISMUTH SUBSALICYLATE 30 ML: 525 LIQUID ORAL at 21:46

## 2024-03-12 RX ADMIN — SODIUM CHLORIDE, PRESERVATIVE FREE 10 ML: 5 INJECTION INTRAVENOUS at 21:46

## 2024-03-12 RX ADMIN — PANTOPRAZOLE SODIUM 40 MG: 40 INJECTION, POWDER, FOR SOLUTION INTRAVENOUS at 15:19

## 2024-03-12 ASSESSMENT — PAIN SCALES - GENERAL
PAINLEVEL_OUTOF10: 0
PAINLEVEL_OUTOF10: 0

## 2024-03-12 ASSESSMENT — LIFESTYLE VARIABLES
HOW OFTEN DO YOU HAVE A DRINK CONTAINING ALCOHOL: NEVER
HOW MANY STANDARD DRINKS CONTAINING ALCOHOL DO YOU HAVE ON A TYPICAL DAY: PATIENT DOES NOT DRINK

## 2024-03-12 ASSESSMENT — PAIN - FUNCTIONAL ASSESSMENT: PAIN_FUNCTIONAL_ASSESSMENT: 0-10

## 2024-03-12 NOTE — ED NOTES
ED to Inpatient Handoff Report    Notified Elida that electronic handoff available and patient ready for transport to room 621.    Safety Risks: risk of falls    Patient in Restraints: no    Constant Observer or Patient : no    Telemetry Monitoring Ordered :Yes      Cardiac Rhythm: Atrial paced    Order to transfer to unit without monitor:YES   Time completed: 1855    Deterioration Index Score:   Predictive Model Details          31  Factor Value    Calculated 3/12/2024 19:28 43% Age 88 years old    Deterioration Index Model 30% Respiratory rate 12     13% Cardiac rhythm Atrial paced     5% Systolic 140     5% Hematocrit abnormal (27.2 %)     2% Sodium 141 mmol/L     1% Pulse 61     1% Potassium 3.7 mmol/L     0% Temperature 97.2 °F (36.2 °C)     0% WBC count 5.3 k/uL     0% Pulse oximetry 96 %        Vitals:    03/12/24 1454 03/12/24 1458 03/12/24 1755 03/12/24 1855   BP:  136/82 135/77 (!) 140/74   Pulse:  70 59 61   Resp:  17 14 12   Temp:    97.2 °F (36.2 °C)   TempSrc:       SpO2:  98%  96%   Weight: 108.9 kg (240 lb)      Height: 1.829 m (6')            Opportunity for questions and clarification was provided.

## 2024-03-12 NOTE — TELEPHONE ENCOUNTER
Daughter calling to cancel apt today with Dr. Garcia - per PCP - they are taking pt to the hospital to be admitted [facility].  low HgB, high INR.

## 2024-03-12 NOTE — ED PROVIDER NOTES
[facility] EMERGENCY DEPARTMENT  EMERGENCY DEPARTMENT ENCOUNTER        Pt Name: Jordon Olmstead  MRN: [id number]  Birthdate 1935  Date of evaluation: 3/12/2024  Provider: Vasiliy Melchor DO  PCP: James Hensley DO  Note Started: 2:45 PM EDT 3/12/24    CHIEF COMPLAINT       Chief Complaint   Patient presents with    low hbg, high inr     Recent admit at [facility] for gi bleed, last hbg one week ago 9.4, today 7.4, INR 3.7 today at PCP    GI Bleeding     On coumadin, was never discontinued after admit at [facility]       HISTORY OF PRESENT ILLNESS: 1 or more Elements   History From: Patient    Limitations to history : None    Jordon Olmstead is a 88 y.o. male who presents to the emergency department for complaint of low hemoglobin.  Patient recently admitted to [facility] for GI bleed.  He is on Coumadin with history of mechanical valve.  Patient underwent EGD that showed no varices and just erosive gastropathy.  He was discharged home on Coumadin.  Patient had lab work today notable for a drop in his hemoglobin 7.4 with INR of 3.7.  He reports some lightheadedness.  He states he has been having darker stools for the past 2 days.  No report of any nausea, vomiting, chest pain, shortness of breath, abdominal pain, no hematochezia.    Nursing Notes were all reviewed and agreed with or any disagreements were addressed in the HPI.      REVIEW OF EXTERNAL NOTE :       Discharge summary 2/26/2024 reviewed.  Patient was admitted for acute exacerbation of CHF, anemia due to active bleeding status post blood transfusion.    REVIEW OF SYSTEMS :           Positives and Pertinent negatives as per HPI.     SURGICAL HISTORY     Past Surgical History:   Procedure Laterality Date    A-V CARDIAC PACEMAKER INSERTION Left 01/28/2014    AORTIC VALVE REPLACEMENT  12/10/2013    bioprosthesis for severe AS    CARDIAC CATHETERIZATION  11/25/2013    CARDIOVERSION  12/23/2013    Dr Medina REZA/[facility]     require blood transfusion.  On reevaluation, patient unable to ambulate due to lightheadedness.  Concern for symptomatic anemia.  With shared decision-making, patient agreeable with plan for admission for further evaluation and management.  Internal medicine accepted patient for admission.    CONSULTS:   IP CONSULT TO INTERNAL MEDICINE        I am the Primary Clinician of Record.    FINAL IMPRESSION      1. Symptomatic anemia    2. Lightheaded    3. Anticoagulated on Coumadin          DISPOSITION/PLAN     DISPOSITION Admitted 03/12/2024 07:06:13 PM      PATIENT REFERRED TO:  No follow-up provider specified.    DISCHARGE MEDICATIONS:  New Prescriptions    No medications on file       DISCONTINUED MEDICATIONS:  Discontinued Medications    No medications on file              (Please note that portions of this note were completed with a voice recognition program.  Efforts were made to edit the dictations but occasionally words are mis-transcribed.)    Vasiliy Melchor DO (electronically signed)

## 2024-03-13 LAB
ANION GAP SERPL CALCULATED.3IONS-SCNC: 8 MMOL/L (ref 7–16)
BASOPHILS # BLD: 0.02 K/UL (ref 0–0.2)
BASOPHILS NFR BLD: 1 % (ref 0–2)
BUN SERPL-MCNC: 18 MG/DL (ref 6–23)
CALCIUM SERPL-MCNC: 8 MG/DL (ref 8.6–10.2)
CHLORIDE SERPL-SCNC: 104 MMOL/L (ref 98–107)
CO2 SERPL-SCNC: 29 MMOL/L (ref 22–29)
CREAT SERPL-MCNC: 1 MG/DL (ref 0.7–1.2)
EKG ATRIAL RATE: 59 BPM
EKG Q-T INTERVAL: 476 MS
EKG QRS DURATION: 112 MS
EKG QTC CALCULATION (BAZETT): 479 MS
EKG R AXIS: 9 DEGREES
EKG T AXIS: 7 DEGREES
EKG VENTRICULAR RATE: 61 BPM
EOSINOPHIL # BLD: 0.32 K/UL (ref 0.05–0.5)
EOSINOPHILS RELATIVE PERCENT: 8 % (ref 0–6)
ERYTHROCYTE [DISTWIDTH] IN BLOOD BY AUTOMATED COUNT: 14.2 % (ref 11.5–15)
GFR SERPL CREATININE-BSD FRML MDRD: >60 ML/MIN/1.73M2
GLUCOSE SERPL-MCNC: 89 MG/DL (ref 74–99)
HCT VFR BLD AUTO: 23.5 % (ref 37–54)
HCT VFR BLD AUTO: 25 % (ref 37–54)
HCT VFR BLD AUTO: 25.5 % (ref 37–54)
HGB BLD-MCNC: 7.1 G/DL (ref 12.5–16.5)
HGB BLD-MCNC: 7.6 G/DL (ref 12.5–16.5)
HGB BLD-MCNC: 7.9 G/DL (ref 12.5–16.5)
IMM GRANULOCYTES # BLD AUTO: <0.03 K/UL (ref 0–0.58)
IMM GRANULOCYTES NFR BLD: 1 % (ref 0–5)
LYMPHOCYTES NFR BLD: 1.23 K/UL (ref 1.5–4)
LYMPHOCYTES RELATIVE PERCENT: 31 % (ref 20–42)
MAGNESIUM SERPL-MCNC: 2 MG/DL (ref 1.6–2.6)
MCH RBC QN AUTO: 27 PG (ref 26–35)
MCHC RBC AUTO-ENTMCNC: 30.2 G/DL (ref 32–34.5)
MCV RBC AUTO: 89.4 FL (ref 80–99.9)
MONOCYTES NFR BLD: 0.34 K/UL (ref 0.1–0.95)
MONOCYTES NFR BLD: 9 % (ref 2–12)
NEUTROPHILS NFR BLD: 52 % (ref 43–80)
NEUTS SEG NFR BLD: 2.05 K/UL (ref 1.8–7.3)
PLATELET # BLD AUTO: 170 K/UL (ref 130–450)
PMV BLD AUTO: 9.2 FL (ref 7–12)
POTASSIUM SERPL-SCNC: 3.8 MMOL/L (ref 3.5–5)
RBC # BLD AUTO: 2.63 M/UL (ref 3.8–5.8)
SODIUM SERPL-SCNC: 141 MMOL/L (ref 132–146)
WBC OTHER # BLD: 4 K/UL (ref 4.5–11.5)

## 2024-03-13 PROCEDURE — 83735 ASSAY OF MAGNESIUM: CPT

## 2024-03-13 PROCEDURE — 6360000002 HC RX W HCPCS: Performed by: NURSE PRACTITIONER

## 2024-03-13 PROCEDURE — 85018 HEMOGLOBIN: CPT

## 2024-03-13 PROCEDURE — 6370000000 HC RX 637 (ALT 250 FOR IP)

## 2024-03-13 PROCEDURE — 2580000003 HC RX 258: Performed by: NURSE PRACTITIONER

## 2024-03-13 PROCEDURE — 85014 HEMATOCRIT: CPT

## 2024-03-13 PROCEDURE — 80048 BASIC METABOLIC PNL TOTAL CA: CPT

## 2024-03-13 PROCEDURE — C9113 INJ PANTOPRAZOLE SODIUM, VIA: HCPCS | Performed by: NURSE PRACTITIONER

## 2024-03-13 PROCEDURE — 6370000000 HC RX 637 (ALT 250 FOR IP): Performed by: NURSE PRACTITIONER

## 2024-03-13 PROCEDURE — 85025 COMPLETE CBC W/AUTO DIFF WBC: CPT

## 2024-03-13 PROCEDURE — 93010 ELECTROCARDIOGRAM REPORT: CPT | Performed by: INTERNAL MEDICINE

## 2024-03-13 PROCEDURE — 2060000000 HC ICU INTERMEDIATE R&B

## 2024-03-13 RX ORDER — PANTOPRAZOLE SODIUM 40 MG/1
40 TABLET, DELAYED RELEASE ORAL
Status: DISCONTINUED | OUTPATIENT
Start: 2024-03-14 | End: 2024-03-14 | Stop reason: HOSPADM

## 2024-03-13 RX ORDER — SUCRALFATE 1 G/1
1 TABLET ORAL
Status: DISCONTINUED | OUTPATIENT
Start: 2024-03-13 | End: 2024-03-14 | Stop reason: HOSPADM

## 2024-03-13 RX ADMIN — BISMUTH SUBSALICYLATE 30 ML: 525 LIQUID ORAL at 22:16

## 2024-03-13 RX ADMIN — SUCRALFATE 1 G: 1 TABLET ORAL at 17:19

## 2024-03-13 RX ADMIN — DOCUSATE SODIUM 100 MG: 100 CAPSULE, LIQUID FILLED ORAL at 10:13

## 2024-03-13 RX ADMIN — SODIUM CHLORIDE, PRESERVATIVE FREE 10 ML: 5 INJECTION INTRAVENOUS at 22:30

## 2024-03-13 RX ADMIN — SODIUM CHLORIDE, PRESERVATIVE FREE 10 ML: 5 INJECTION INTRAVENOUS at 10:15

## 2024-03-13 RX ADMIN — METRONIDAZOLE 250 MG: 500 TABLET ORAL at 13:14

## 2024-03-13 RX ADMIN — SUCRALFATE 1 G: 1 TABLET ORAL at 22:15

## 2024-03-13 RX ADMIN — BISMUTH SUBSALICYLATE 30 ML: 525 LIQUID ORAL at 10:21

## 2024-03-13 RX ADMIN — BISMUTH SUBSALICYLATE 30 ML: 525 LIQUID ORAL at 05:51

## 2024-03-13 RX ADMIN — BISMUTH SUBSALICYLATE 30 ML: 525 LIQUID ORAL at 17:19

## 2024-03-13 RX ADMIN — METRONIDAZOLE 250 MG: 500 TABLET ORAL at 10:13

## 2024-03-13 RX ADMIN — SUCRALFATE 1 G: 1 TABLET ORAL at 10:13

## 2024-03-13 RX ADMIN — METRONIDAZOLE 250 MG: 500 TABLET ORAL at 22:15

## 2024-03-13 RX ADMIN — PROPRANOLOL HYDROCHLORIDE 60 MG: 60 CAPSULE, EXTENDED RELEASE ORAL at 10:13

## 2024-03-13 RX ADMIN — MAGNESIUM OXIDE 400 MG (241.3 MG MAGNESIUM) TABLET 400 MG: TABLET at 10:13

## 2024-03-13 RX ADMIN — ESCITALOPRAM OXALATE 20 MG: 10 TABLET ORAL at 10:13

## 2024-03-13 RX ADMIN — ATORVASTATIN CALCIUM 20 MG: 20 TABLET, FILM COATED ORAL at 22:15

## 2024-03-13 RX ADMIN — GABAPENTIN 400 MG: 400 CAPSULE ORAL at 22:15

## 2024-03-13 RX ADMIN — SODIUM CHLORIDE, PRESERVATIVE FREE 40 MG: 5 INJECTION INTRAVENOUS at 02:55

## 2024-03-13 RX ADMIN — GABAPENTIN 400 MG: 400 CAPSULE ORAL at 17:19

## 2024-03-13 RX ADMIN — LEVOTHYROXINE SODIUM 25 MCG: 25 TABLET ORAL at 22:15

## 2024-03-13 RX ADMIN — METRONIDAZOLE 250 MG: 500 TABLET ORAL at 17:36

## 2024-03-13 RX ADMIN — GABAPENTIN 400 MG: 400 CAPSULE ORAL at 13:14

## 2024-03-13 RX ADMIN — GABAPENTIN 400 MG: 400 CAPSULE ORAL at 10:13

## 2024-03-13 NOTE — CARE COORDINATION
Transition of Care-Met with patient this am, he is independent from home.   Patient lives in [place], he travels to the area to see his PCP-Dr. James Hensley, preferred pharmacy is [facility]. He resides with his wife Raquel in a one story home, he is set up to start home care from [facility] this week, just discharged last week. Plan to cycle H&H. His daughter will transport home. No anticipated needs.    Radha DANIELSN, RN  [facility]

## 2024-03-13 NOTE — PROGRESS NOTES
While rounding unable to complete visit as patient is not available for visit.  remains available for support.

## 2024-03-13 NOTE — PROGRESS NOTES
4 Eyes Skin Assessment     NAME:  Jordon Olmstead  YOB: 1935  MEDICAL RECORD NUMBER:  [id number]    The patient is being assessed for  Admission    I agree that at least one RN has performed a thorough Head to Toe Skin Assessment on the patient. ALL assessment sites listed below have been assessed.      Areas assessed by both nurses:    Head, Face, Ears, Shoulders, Back, Chest, Arms, Elbows, Hands, Sacrum. Buttock, Coccyx, Ischium, Legs. Feet and Heels, and Under Medical Devices         Does the Patient have a Wound? No noted wound(s)       Vincent Prevention initiated by RN: No  Wound Care Orders initiated by RN: No    Pressure Injury (Stage 3,4, Unstageable, DTI, NWPT, and Complex wounds) if present, place Wound referral order by RN under : No    New Ostomies, if present place, Ostomy referral order under : No     Nurse 1 eSignature: Electronically signed by Cecile Chowdary RN on 3/12/24 at 8:46 PM EDT    **SHARE this note so that the co-signing nurse can place an eSignature**    Nurse 2 eSignature: Electronically signed by Carmen Salas RN on 3/12/24 at 8:48 PM EDT

## 2024-03-13 NOTE — CONSULTS
GENERAL SURGERY  CONSULT NOTE    Patient's Name/Date of Birth: Jordon Olmstead / 1935    Date: March 13, 2024     PCP: James Hensley DO     Chief Complaint:   Chief Complaint   Patient presents with    low hbg, high inr     Recent admit at [facility] for gi bleed, last hbg one week ago 9.4, today 7.4, INR 3.7 today at PCP    GI Bleeding     On coumadin, was never discontinued after admit at [facility]       Physician Consulted: Dr. Weldon   Reason for Consult: Symptomatic anemia  Referring Physician: Dr. Jun VALLADARES  Jordon Olmstead is a 88 y.o. male who presents for evaluation of symptomatic anemia.  Patient presented to the emergency room following abnormal lab values that showed a hemoglobin of 7.4 and INR 3.7 Patient was recently seen at [facility] for a GI bleed.  At that time underwent an EGD that showed erosive gastropathy.  Patient states that he does endorse some darker stools over last couple days.  Reports some occasional lightheadedness.  Denies any nausea, vomiting, abdominal pain.  Does not smoke.  Not take any NSAIDs.  Labs reviewed.  Hemoglobin 8.1, 7.9, 7.1.  Patient is on Coumadin for history of a mechanical heart valve.    Patient had a colonoscopy on 3/21 at [facility] which showed multiple diverticula within the sigmoid colon, hemorrhoids, and a submucosal nodule of the sigmoid colon that was tattooed.         Past Medical History:   Diagnosis Date    Anticoagulated on Coumadin     Anxiety     Arthritis     Atrial fibrillation (HCC)     follows Dr. Garcia    Blindness LAST WEEK AUG 2014    BILATERAL, TEMPORAY,HAD 2 EPISODES LASTED 10-15 SECONDS / 2016 / no further episodes    Cancer (HCC) 2013    skin    COPD (chronic obstructive pulmonary disease) (HCC)     Emphysema of lung (HCC)     mild    Environmental allergies     POLLEN    Essential tremor     head and hands    Fractured pelvis (HCC) 1975    and fractured wrist after fall from tree    Frequency of urination     [place] (hard of hearing)

## 2024-03-13 NOTE — H&P
[facility]  History and Physical      CHIEF COMPLAINT:    Chief Complaint   Patient presents with    low hbg, high inr     Recent admit at [facility] for gi bleed, last hbg one week ago 9.4, today 7.4, INR 3.7 today at PCP    GI Bleeding     On coumadin, was never discontinued after admit at [facility]        Patient of James Hensley  presents with:  Symptomatic anemia    History of Present Illness:   Patient is an 88-year-old male with a past medical history of atrial fibrillation anticoagulated on Coumadin, COPD, pulmonary emboli, SSS status post pacemaker placement, hypothyroidism who presents to the emergency room for low hemoglobin with a high INR, GI bleeding.  On arrival to emergency patient had labs that revealed an elevated proBNP of 2, 291, H&H of 8.1/27.2 with an INR of 3.3.  CT head was negative for anything acute as well as a chest x-ray.  Patient was recently admitted at the [facility] for GI bleeding where he underwent a EGD and C-scope however was never discontinued off of his Coumadin on discharge.  Patient is admitted to [facility] with telemetry for further workup and treatment.      REVIEW OF SYSTEMS:  Pertinent negatives are above in HPI.  10 point ROS otherwise negative.      Past Medical History:   Diagnosis Date    Anticoagulated on Coumadin     Anxiety     Arthritis     Atrial fibrillation (HCC)     follows Dr. Garcia    Blindness LAST WEEK AUG 2014    BILATERAL, TEMPORAY,HAD 2 EPISODES LASTED 10-15 SECONDS / 2016 / no further episodes    Cancer (HCC) 2013    skin    COPD (chronic obstructive pulmonary disease) (HCC)     Emphysema of lung (HCC)     mild    Environmental allergies     POLLEN    Essential tremor     head and hands    Fractured pelvis (HCC) 1975    and fractured wrist after fall from tree    Frequency of urination     [place] (hard of hearing)     no aides    Hx of blood clots 07/2012    after kyphoplasty,PE    Hypertension     Mitral regurgitation     per echo  mother.      PHYSICAL EXAM:    Vitals:  BP (!) 114/50   Pulse 73   Temp 99 °F (37.2 °C) (Oral)   Resp 18   Ht 1.829 m (6')   Wt 111.9 kg (246 lb 9.6 oz)   SpO2 90%   BMI 33.44 kg/m²       General appearance: NAD, conversant, diffuse pallor  Eyes: Sclerae anicteric, PERRLA  HEENT: AT/NC, MMM  Neck: FROM, supple, no thyromegaly  Lymph: No cervical / supraclavicular lymphadenopathy  Lungs: Clear to auscultation, WOB normal  CV: RRR, murmur present  Abdomen: Soft, non-tender; no masses or HSM, +BS  Extremities: FROM without synovitis.  No clubbing or cyanosis of the hands.  Skin: no rash, induration, lesions, or ulcers  Psych: Calm and cooperative.  Normal judgement and insight.  Normal mood and affect.  Neuro: Alert and interactive, face symmetric, speech fluent.    LABS:  All labs reviewed.  Of note:  CBC:   Lab Results   Component Value Date/Time    WBC 4.0 03/13/2024 06:00 AM    RBC 2.63 03/13/2024 06:00 AM    HGB 7.1 03/13/2024 06:00 AM    HCT 23.5 03/13/2024 06:00 AM    MCV 89.4 03/13/2024 06:00 AM    MCH 27.0 03/13/2024 06:00 AM    MCHC 30.2 03/13/2024 06:00 AM    RDW 14.2 03/13/2024 06:00 AM     03/13/2024 06:00 AM    MPV 9.2 03/13/2024 06:00 AM     CMP:    Lab Results   Component Value Date/Time     03/13/2024 06:00 AM    K 3.8 03/13/2024 06:00 AM     03/13/2024 06:00 AM    CO2 29 03/13/2024 06:00 AM    BUN 18 03/13/2024 06:00 AM    CREATININE 1.0 03/13/2024 06:00 AM    GFRAA >60 12/18/2015 10:05 AM    LABGLOM >60 03/13/2024 06:00 AM    GLUCOSE 89 03/13/2024 06:00 AM    PROT 7.0 03/12/2024 03:07 PM    LABALBU 3.4 03/12/2024 03:07 PM    CALCIUM 8.0 03/13/2024 06:00 AM    BILITOT 0.3 03/12/2024 03:07 PM    ALKPHOS 90 03/12/2024 03:07 PM    AST 20 03/12/2024 03:07 PM    ALT 13 03/12/2024 03:07 PM       Imaging:  CT head: Negative  Chest x-ray negative    EKG:  Junctional rhythm      ASSESSMENT/PLAN:  Principal Problem:    Symptomatic anemia  Resolved Problems:    * No resolved hospital

## 2024-03-13 NOTE — ACP (ADVANCE CARE PLANNING)
Advance Care Planning   Healthcare Decision Maker:    Primary Decision Maker: Raquel Olmstead [facility] - [phone]    Click here to complete Healthcare Decision Makers including selection of the Healthcare Decision Maker Relationship (ie \"Primary\").

## 2024-03-14 VITALS
TEMPERATURE: 98.3 F | HEART RATE: 63 BPM | SYSTOLIC BLOOD PRESSURE: 114 MMHG | OXYGEN SATURATION: 93 % | DIASTOLIC BLOOD PRESSURE: 59 MMHG | HEIGHT: 72 IN | RESPIRATION RATE: 18 BRPM | WEIGHT: 240 LBS | BODY MASS INDEX: 32.51 KG/M2

## 2024-03-14 LAB
ANION GAP SERPL CALCULATED.3IONS-SCNC: 8 MMOL/L (ref 7–16)
BASOPHILS # BLD: 0.01 K/UL (ref 0–0.2)
BASOPHILS NFR BLD: 0 % (ref 0–2)
BUN SERPL-MCNC: 14 MG/DL (ref 6–23)
CALCIUM SERPL-MCNC: 8.2 MG/DL (ref 8.6–10.2)
CHLORIDE SERPL-SCNC: 103 MMOL/L (ref 98–107)
CO2 SERPL-SCNC: 27 MMOL/L (ref 22–29)
CREAT SERPL-MCNC: 1 MG/DL (ref 0.7–1.2)
EOSINOPHIL # BLD: 0.4 K/UL (ref 0.05–0.5)
EOSINOPHILS RELATIVE PERCENT: 9 % (ref 0–6)
ERYTHROCYTE [DISTWIDTH] IN BLOOD BY AUTOMATED COUNT: 14 % (ref 11.5–15)
GFR SERPL CREATININE-BSD FRML MDRD: >60 ML/MIN/1.73M2
GLUCOSE SERPL-MCNC: 96 MG/DL (ref 74–99)
HCT VFR BLD AUTO: 27 % (ref 37–54)
HGB BLD-MCNC: 8.2 G/DL (ref 12.5–16.5)
IMM GRANULOCYTES # BLD AUTO: 0.03 K/UL (ref 0–0.58)
IMM GRANULOCYTES NFR BLD: 1 % (ref 0–5)
INR PPP: 3.4
LYMPHOCYTES NFR BLD: 1.19 K/UL (ref 1.5–4)
LYMPHOCYTES RELATIVE PERCENT: 25 % (ref 20–42)
MCH RBC QN AUTO: 27 PG (ref 26–35)
MCHC RBC AUTO-ENTMCNC: 30.4 G/DL (ref 32–34.5)
MCV RBC AUTO: 88.8 FL (ref 80–99.9)
MONOCYTES NFR BLD: 0.4 K/UL (ref 0.1–0.95)
MONOCYTES NFR BLD: 9 % (ref 2–12)
NEUTROPHILS NFR BLD: 57 % (ref 43–80)
NEUTS SEG NFR BLD: 2.68 K/UL (ref 1.8–7.3)
PLATELET # BLD AUTO: 198 K/UL (ref 130–450)
PMV BLD AUTO: 9.2 FL (ref 7–12)
POTASSIUM SERPL-SCNC: 3.8 MMOL/L (ref 3.5–5)
PROTHROMBIN TIME: 38.7 SEC (ref 9.3–12.4)
RBC # BLD AUTO: 3.04 M/UL (ref 3.8–5.8)
SODIUM SERPL-SCNC: 138 MMOL/L (ref 132–146)
WBC OTHER # BLD: 4.7 K/UL (ref 4.5–11.5)

## 2024-03-14 PROCEDURE — 80048 BASIC METABOLIC PNL TOTAL CA: CPT

## 2024-03-14 PROCEDURE — 85025 COMPLETE CBC W/AUTO DIFF WBC: CPT

## 2024-03-14 PROCEDURE — 2580000003 HC RX 258: Performed by: NURSE PRACTITIONER

## 2024-03-14 PROCEDURE — 6370000000 HC RX 637 (ALT 250 FOR IP)

## 2024-03-14 PROCEDURE — 36415 COLL VENOUS BLD VENIPUNCTURE: CPT

## 2024-03-14 PROCEDURE — 6370000000 HC RX 637 (ALT 250 FOR IP): Performed by: NURSE PRACTITIONER

## 2024-03-14 PROCEDURE — 6370000000 HC RX 637 (ALT 250 FOR IP): Performed by: SURGERY

## 2024-03-14 PROCEDURE — 85610 PROTHROMBIN TIME: CPT

## 2024-03-14 RX ORDER — WARFARIN SODIUM 2.5 MG/1
2.5 TABLET ORAL EVERY OTHER DAY
Qty: 30 TABLET | Refills: 3 | Status: SHIPPED | OUTPATIENT
Start: 2024-03-25

## 2024-03-14 RX ORDER — SUCRALFATE 1 G/1
1 TABLET ORAL
Qty: 120 TABLET | Refills: 3 | Status: SHIPPED | OUTPATIENT
Start: 2024-03-14

## 2024-03-14 RX ORDER — WARFARIN SODIUM 6 MG/1
3 TABLET ORAL EVERY OTHER DAY
Qty: 30 TABLET | Refills: 3 | Status: SHIPPED | OUTPATIENT
Start: 2024-03-25

## 2024-03-14 RX ORDER — METRONIDAZOLE 250 MG/1
250 TABLET ORAL 4 TIMES DAILY
Qty: 22 TABLET | Refills: 0 | Status: SHIPPED | OUTPATIENT
Start: 2024-03-14 | End: 2024-03-20

## 2024-03-14 RX ADMIN — SUCRALFATE 1 G: 1 TABLET ORAL at 06:10

## 2024-03-14 RX ADMIN — SUCRALFATE 1 G: 1 TABLET ORAL at 11:11

## 2024-03-14 RX ADMIN — PANTOPRAZOLE SODIUM 40 MG: 40 TABLET, DELAYED RELEASE ORAL at 06:10

## 2024-03-14 RX ADMIN — BISMUTH SUBSALICYLATE 30 ML: 525 LIQUID ORAL at 06:10

## 2024-03-14 RX ADMIN — BISMUTH SUBSALICYLATE 30 ML: 525 LIQUID ORAL at 11:11

## 2024-03-14 RX ADMIN — PROPRANOLOL HYDROCHLORIDE 60 MG: 60 CAPSULE, EXTENDED RELEASE ORAL at 09:32

## 2024-03-14 RX ADMIN — METRONIDAZOLE 250 MG: 500 TABLET ORAL at 12:42

## 2024-03-14 RX ADMIN — SODIUM CHLORIDE, PRESERVATIVE FREE 10 ML: 5 INJECTION INTRAVENOUS at 09:32

## 2024-03-14 RX ADMIN — GABAPENTIN 400 MG: 400 CAPSULE ORAL at 09:29

## 2024-03-14 RX ADMIN — GABAPENTIN 400 MG: 400 CAPSULE ORAL at 12:43

## 2024-03-14 RX ADMIN — ESCITALOPRAM OXALATE 20 MG: 10 TABLET ORAL at 09:30

## 2024-03-14 RX ADMIN — METRONIDAZOLE 250 MG: 500 TABLET ORAL at 09:30

## 2024-03-14 RX ADMIN — MAGNESIUM OXIDE 400 MG (241.3 MG MAGNESIUM) TABLET 400 MG: TABLET at 09:30

## 2024-03-14 NOTE — PROGRESS NOTES
Daughter called unit regarding discharge instructions. Provided daughter with detailed discharge instructions and answered all questions in detail.

## 2024-03-14 NOTE — PROGRESS NOTES
[facility]   Progress note      Subjective:    The patient is awake and alert.  Sitting up in bed. No complaitns. Has questions about his diet. Anxious for dc. Hgb has remained stable with no further bleeding.  No acute events overnight.    Denies chest pain, angina, SOB     Objective:    BP (!) 146/68   Pulse 71   Temp 97.9 °F (36.6 °C) (Axillary)   Resp 18   Ht 1.829 m (6')   Wt 108.9 kg (240 lb)   SpO2 92%   BMI 32.55 kg/m²     In: 180 [P.O.:180]  Out: 100   In: 180   Out: 100 [Urine:100]    General appearance: NAD, conversant, diffuse pallor  Eyes: Sclerae anicteric, PERRLA  HEENT: AT/NC, MMM  Neck: FROM, supple, no thyromegaly  Lymph: No cervical / supraclavicular lymphadenopathy  Lungs: Clear to auscultation, WOB normal  CV: RRR, murmur present  Abdomen: Soft, non-tender; no masses or HSM, +BS  Extremities: FROM without synovitis.  No clubbing or cyanosis of the hands.  Skin: no rash, induration, lesions, or ulcers  Psych: Calm and cooperative.  Normal judgement and insight.  Normal mood and affect.  Neuro: Alert and interactive, face symmetric, speech fluent.     Recent Labs     03/12/24  1507 03/12/24  2240 03/13/24  0600 03/13/24  1415 03/13/24  2222 03/14/24  0608   WBC 5.3  --  4.0*  --   --  4.7   HGB 8.1*   < > 7.1* 7.6* 7.9* 8.2*   HCT 27.2*   < > 23.5* 25.0* 25.5* 27.0*     --  170  --   --  198    < > = values in this interval not displayed.       Recent Labs     03/12/24  1507 03/13/24  0600 03/14/24  0608    141 138   K 3.7 3.8 3.8    104 103   CO2 29 29 27   BUN 22 18 14   CREATININE 1.1 1.0 1.0   CALCIUM 8.4* 8.0* 8.2*       Assessment:    Principal Problem:    Symptomatic anemia  Resolved Problems:    * No resolved hospital problems. *      Plan:    Patient is a 88-year-old male admitted to [facility] for  Symptomatic anemia  -Monitor labs  -H&H 8.1/27.2 > 7.1/23.5  -General surgery following  -No plans for EGD/C-scope at this time given recent [facility]  admission for GI bleed with scopes performed  -Continue PPI and Carafate  -Okay for diet from general surgery standpoint     Atrial fibrillation  -Continue home propranolol for rate control  -Hold home Coumadin  --he has a mechanical valve, INR supposed to be 2.5-3.5, currently 3.3; would not reverse unless there is bleeding  --sees cardio at      3/14/2024  --holding anticoags  --hgb is 8.2  --check INR  --discuss with surgery when safe to resume anticoagulation is setting of bioprosthetic valve s/p avr, fib, pacer  --will also see if we can reach cardio at  regarding this  --no invasive interventions planned at this time, pt is comfortable  --bland diet  --ppi therapy  --carafate    Medication for other comorbidities continue as appropriate dose adjustment as necessary.     DVT prophylaxis PCD's  PT OT  Discharge planning        Code status: Full  Requires Inpatient level of care    I have spent a total time of 25 minutes of this patient encounter reviewing chart, labs, radiological reports coordinating care with interdisciplinary teams, face to face encounter with patient, providing counseling/education to patient/family, and formulating plan.       Allison Barahona,   9:28 AM  3/14/2024

## 2024-03-14 NOTE — PROGRESS NOTES
Martha Carlton called unit early and wanted informed of discharge instructions. I attempted to call Martha carlton and left voicemail message to call unit.

## 2024-03-14 NOTE — PROGRESS NOTES
Message to Dr. Garcia with request for coumadin recommendations. Awaiting response.    Dr. Garcia reviewed chart and stated okay to hold coumadin until anemia is resolved.     Electronically signed by Marylu Multani RN on 3/14/2024 at 10:41 AM

## 2024-03-14 NOTE — PROGRESS NOTES
GENERAL SURGERY  DAILY PROGRESS NOTE    Patient's Name/Date of Birth: Jordon Olmstead / 1935    Date: 2024     Chief Complaint   Patient presents with    low hbg, high inr     Recent admit at [facility] for gi bleed, last hbg one week ago 9.4, today 7.4, INR 3.7 today at PCP    GI Bleeding     On coumadin, was never discontinued after admit at [facility]        Subjective:    Doing well this morning. No acute events overnight.       Objective:  Last 24Hrs  Temp  Av.8 °F (36.6 °C)  Min: 97 °F (36.1 °C)  Max: 98.4 °F (36.9 °C)  Resp  Av.3  Min: 16  Max: 18  Pulse  Av  Min: 64  Max: 73  Systolic (24hrs), Av , Min:116 , Max:146     Diastolic (24hrs), Av, Min:62, Max:74    SpO2  Av.5 %  Min: 92 %  Max: 95 %    I/O last 3 completed shifts:  In: 780 [P.O.:780]  Out: 100 [Urine:100]      General: In no acute distress, alert and oriented x4  Cardiovascular: Warm throughout, no edema  Respiratory: no respiratory distress, equal chest rise  Abdomen: soft,  nontender, nondistended        CBC  Recent Labs     24  1507 24  2240 24  0600 24  1415 24  2222 24  0608   WBC 5.3  --  4.0*  --   --  4.7   RBC 3.01*  --  2.63*  --   --  3.04*   HGB 8.1*   < > 7.1* 7.6* 7.9* 8.2*   HCT 27.2*   < > 23.5* 25.0* 25.5* 27.0*   MCV 90.4  --  89.4  --   --  88.8   MCH 26.9  --  27.0  --   --  27.0   MCHC 29.8*  --  30.2*  --   --  30.4*   RDW 14.1  --  14.2  --   --  14.0     --  170  --   --  198   MPV 9.0  --  9.2  --   --  9.2    < > = values in this interval not displayed.       CMP  Recent Labs     24  1507 24  0600 24  0608    141 138   K 3.7 3.8 3.8    104 103   CO2 29 29 27   BUN 22 18 14   CREATININE 1.1 1.0 1.0   GLUCOSE 99 89 96   CALCIUM 8.4* 8.0* 8.2*   PROT 7.0  --   --    LABALBU 3.4*  --   --    BILITOT 0.3  --   --    ALKPHOS 90  --   --    AST 20  --   --    ALT 13  --   --          Assessment/Plan:    Patient Active  Problem List   Diagnosis    HTN (hypertension), benign    Aortic stenosis    Atrial fibrillation (HCC)    COPD (chronic obstructive pulmonary disease) ([facility])    S/P AVR    MR (mitral regurgitation)    SSS (sick sinus syndrome) ([facility])    TIA (transient ischemic attack)    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    Cardiac pacemaker in situ    Acute maxillary sinusitis    Acute prostatitis    Amaurosis    Aortic valve disorder    Arthralgia of lower leg    Arthritis    Ataxia    Benign prostatic hyperplasia without urinary obstruction    Dysarthria    Essential tremor    Gout    Hard, firm prostate    Hyperlipidemia    Hypothyroidism    Intervertebral cervical disc disorder with myelopathy, cervical region    Malaise and fatigue    Migraine    Tremor    Ulnar neuropathy at elbow    Urinary tract infectious disease    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 male anemia       - Cont w/ PPI, carafate, and [place] diet  - Hgb 8.2; has remained stable; currently no plans for surgical intervention   - Cont to monitor H&H       Oliver Choi DO  General Surgery Resident, PGY-1    Electronically signed on 3/14/2024 at 8:45 AM    No

## 2024-03-14 NOTE — DISCHARGE SUMMARY
Patient Condition at Discharge: ***    Patient Instructions:      Medication List        ASK your doctor about these medications      atorvastatin 20 MG tablet  Commonly known as: LIPITOR     bismuth subsalicylate 262 MG chewable tablet  Commonly known as: PEPTO BISMOL     candesartan 16 MG tablet  Commonly known as: ATACAND     cetirizine 10 MG tablet  Commonly known as: ZYRTEC     docusate sodium 100 MG capsule  Commonly known as: COLACE     doxycycline hyclate 100 MG tablet  Commonly known as: VIBRA-TABS     gabapentin 100 MG capsule  Commonly known as: NEURONTIN     levothyroxine 25 MCG tablet  Commonly known as: SYNTHROID     Lexapro 20 MG tablet  Generic drug: escitalopram     magnesium oxide 400 (240 Mg) MG tablet  Commonly known as: MAG-OX  Take 1 tablet by mouth daily.     metoprolol succinate 25 MG extended release tablet  Commonly known as: TOPROL XL  Take 1 tablet by mouth 2 times daily.     metroNIDAZOLE 250 MG tablet  Commonly known as: FLAGYL     mirtazapine 15 MG tablet  Commonly known as: REMERON     olmesartan 20 MG tablet  Commonly known as: BENICAR     Omega-3 1000 MG Caps     pantoprazole 40 MG tablet  Commonly known as: PROTONIX     propranolol 60 MG extended release capsule  Commonly known as: INDERAL LA     * warfarin 2.5 MG tablet  Commonly known as: COUMADIN     * warfarin 6 MG tablet  Commonly known as: COUMADIN           * This list has 2 medication(s) that are the same as other medications prescribed for you. Read the directions carefully, and ask your doctor or other care provider to review them with you.                Activity: {discharge activity:51374}  Diet: {diet:13856}    Pt has been advised to:    Follow-up with James Hensley DO in 1 week.  Follow-up with consultants as recommended by them    Note that over 30 minutes was spent in preparing discharge papers, discussing discharge with patient, medication review, etc.    Signed:  Allison Barahona DO  3/14/2024  10:56  AM

## 2024-03-15 ENCOUNTER — TELEPHONE (OUTPATIENT)
Dept: CARDIOLOGY CLINIC | Age: 89
End: 2024-03-15

## 2024-03-15 ENCOUNTER — TELEPHONE (OUTPATIENT)
Dept: PHARMACY | Facility: CLINIC | Age: 89
End: 2024-03-15

## 2024-03-15 ENCOUNTER — CARE COORDINATION (OUTPATIENT)
Dept: CARE COORDINATION | Age: 89
End: 2024-03-15

## 2024-03-15 DIAGNOSIS — D64.9 SYMPTOMATIC ANEMIA: Primary | ICD-10-CM

## 2024-03-15 NOTE — TELEPHONE ENCOUNTER
Received a referral:  from Care Coordinator to review patient’s medications.     Please outreach dtr/Martha for medication rec. Has questions concerning timing and interactions of medications. On carafate and pepto tabs. Cardio is advising to hold/stop warfarin and will reconsider 3/25/24.    Called patient to schedule a time to speak with a pharmacist over the telephone.     Spoke to patient's daughter, Martha and advised them of the above message.    Martha verified understanding and scheduled their appointment: 03.21.24 @ 8:30 AM.    Baylee [facility]  Population Health    [facility]   [phone] option 2     For Pharmacy Admin Tracking Only    Program: [employer]  CPA in place:  No  Recommendation Provided To: Patient/Caregiver: 1 via Telephone  Intervention Detail: Scheduled Appointment  Intervention Accepted By: Patient/Caregiver: 1  Gap Closed?: Yes   Time Spent (min): 10

## 2024-03-15 NOTE — TELEPHONE ENCOUNTER
Patient's daughter called to reschedule previous hospital F/U that was cancelled because patient was readmitted.  Patient was not seen by cardiology during most recent admission.  F/U scheduled with Moy CATHERINE on 3/26/24 at 2:00 p.m. F/U with Dr. Garcia on 5/16/24 at 3:40 p.m.  Please advise regarding any additional recommendations, if any.

## 2024-03-15 NOTE — TELEPHONE ENCOUNTER
I believe he was admitted recently with GI bleeding.  The hospital doctor did ask me about the blood thinners.  Currently, it is too risky to take the blood thinners the risk of taking blood thinners outweighs the benefit at this time.  He can safely hold blood thinners for now.

## 2024-03-15 NOTE — CARE COORDINATION
Care Transitions Initial Follow Up Call    Call within 2 business days of discharge: Yes    Patient Current Location:  Home: [address] [place] [id number]    Care Transition Nurse contacted the patient by telephone to perform post hospital discharge assessment. Verified name and  with patient as identifiers. Provided introduction to self, and explanation of the Care Transition Nurse role.     Patient: Jordon Olmstead Patient : 1935   MRN: [id number]  Reason for Admission: 3/12/2024 - 3/14/2024 [facility]. Symptomatic anemia, Recent GIB on warfarin.   Discharge Date: 3/14/24 RARS: Readmission Risk Score: 13  RA  CT    CTN: call dtr after next pt contact.    Non [facility] PCP. Dtr to schedule.  Cardio/Moy Leal APRN-CNP on 3/26/24 at 2:00 p.m.     [facility] Dr [address]ier 24    START taking:  sucralfate (CARAFATE)  CHANGE how you take:  metroNIDAZOLE (FLAGYL)  warfarin (COUMADIN). On hold until .  STOP taking:  candesartan 16 MG tablet (ATACAND)  cetirizine 10 MG tablet (ZYRTEC)  metoprolol succinate 25 MG extended release  tablet (TOPROL XL)  mirtazapine 15 MG tablet (REMERON)  olmesartan 20 MG tablet (BENICAR)  Omega-3 1000 MG Caps    Last Discharge Facility       Date Complaint Diagnosis Description Type Department Provider    3/12/24 low hbg, high inr; GI Bleeding Symptomatic anemia ... ED to Hosp-Admission (Discharged) (ADMITTED) JAMES 6W Allison Barahona, ; AIDAN Gould..            Was this an external facility discharge? No Discharge Facility: [facility]    Challenges to be reviewed by the provider   Additional needs identified to be addressed with provider: No  none               Method of communication with provider: none.    CTN briefly spoke w/ Jordon. Pt reports he is feeling fine and has no GI upset, abd pain, or blood in stools. Pt shares his dtr/Martha manages his medications. He seemed unaware his warfarin is on hold.    CTN spoke w/ xavierr/Martha and did review his

## 2024-03-20 ENCOUNTER — CARE COORDINATION (OUTPATIENT)
Dept: CARE COORDINATION | Age: 89
End: 2024-03-20

## 2024-03-20 NOTE — CARE COORDINATION
Care Transitions Follow Up Call    Care Transition Nurse attempted subsequent outreach leaving HIPAA VM, purpose of call, my contact info.     Patient: Jordon Olmstead  Patient : 1935   MRN: [id number]  Reason for Admission: 3/12/2024 - 3/14/2024 [facility] IP. Symptomatic anemia, Recent GIB on warfarin.    Discharge Date: 3/14/24 RARS: Readmission Risk Score: 13  RA  CT     CTN: call dtr after next pt contact.     Non [facility] PCP. Dtr to schedule.  Cardio/Moy Leal APRN-CNP on 3/26/24 at 2:00 p.m.      [facility] Dr [address]ier 24    Charlotte Ahn RN

## 2024-03-21 ENCOUNTER — TELEPHONE (OUTPATIENT)
Dept: PHARMACY | Facility: CLINIC | Age: 89
End: 2024-03-21

## 2024-03-21 ENCOUNTER — CARE COORDINATION (OUTPATIENT)
Dept: CARE COORDINATION | Age: 89
End: 2024-03-21

## 2024-03-21 NOTE — TELEPHONE ENCOUNTER
CLINICAL PHARMACY NOTE - Medication Review    Jordon Olmstead is a 88 y.o. male referred to a clinical pharmacy specialist by care coordination.  Spoke to his daughter Martha this morning who informed me that he had been readmitted to [facility] on 3/19 and is still there as of today.  We still had a brief discussion on his medications and also offered to complete review after he is discharged.  She will call back and let me know.    Discussed Carafate in great detail.  Explained MOA and the importance of spacing of certain medications.  Unclear if it will be continued or not.  Encouraged her to discuss the possibility of 2-3x daily dosing if continued.  4x daily dosing makes spacing of certain meds impossible.  Levothyroxine- recommended to space 4hrs before or after carafate  Warfarin- recommended to space 2hrs before or after carafate  Doxycycline- recommended to space 4hrs before or after carafate  Antacids- recommended to space 30 min before before carafate  Asked about an interaction with pepto- no know interactions, but recommended using pepto 30min before  She mentioned atorvastatin being taken at nighttime.  Discussed the theory behind this, but also that it is ok to take in the morning especially if it makes remembering easier.    Will check back daily for hospital discharge.    Thank you,  MARU Hernandez, PharmD, BCACP  Ambulatory Care Clinical Pharmacist- [facility] Clinical [facility]  Department, toll free: [phone]    For Pharmacy Admin Tracking Only    Program: [employer]  CPA in place:  No  Gap Closed?: Yes   Time Spent (min): 30

## 2024-03-21 NOTE — CARE COORDINATION
Pt readmitted to [facility] on 3/19/24 for anemia, as notified to me by James Hernandez RP. CTN s/o.  //DREW HEBERT CTN

## 2024-03-25 NOTE — PROGRESS NOTES
Outpatient Cardiology Office Visit    Primary Cardiologist: Dr. Garcia    Reason for Visit: Hospital follow-up      HISTORY OF PRESENT ILLNESS:   Patient is an 88-year-old male who is known to [facility] cardiology to Dr. Garcia and was last seen outpatient 9/2/2022 by Dr. Garcia for PAF.  Patient sinus rhythm at that time.  Recent admission 2/26/2024 at [facility] for concern for CHF.  Patient was found to be anemic and received PRBC.  Patient had EGD which showed stigmata of recent bleeding and normal C-scope.  Patient admitted to [facility] 3/13 for anemia 7.4 hemoglobin with dark stools and occasional lightheadedness.  Dr. Garcia was spoken with 3/15 and via telephone record patient was told to hold warfarin due to recent GIB.  Patient admitted [facility] 3/19/2024 for dark stools that was deemed secondary to medications taken for H. pylori infection and H&H was stable.  Today, patient presenting with only ongoing complaint of fatigue which she reports is chronic.  He denies chest pain, SOB, LUONG, orthopnea, PND, falls, dizziness, syncope, black/tarry stool, or hematuria.  He reports since discharge from hospital he has been feeling somewhat better but lacks energy.  On exam patient has trace edema in his lower legs with clear lungs and no JVD.  He is euvolemic on examination.  He does have a 3/6 systolic murmur heard best at left sternal border.  Patient educated about referral to valve clinic for possible watchman and mitral clip.  Patient agreeable to follow-up with valve clinic at this time and has been referred.  EKG showing sinus rhythm first-degree AVB, 75 bpm.    Past medical history:  PAF:  First identified in 2012 with accompanying bilateral PE  [id number] at least 5, maintained on Coumadin  Follows with electrophysiology [facility]  HFpEF?/VHD:  Severe aortic stenosis s/p bioprosthetic AVR 12/10/2013 with maze and PRO clipping  TTE 3/717: EF 60%.  Mild concentric LVH.  NWMA.  LA severely dilated.  Severely enlarged RA.  Mild

## 2024-03-26 ENCOUNTER — OFFICE VISIT (OUTPATIENT)
Dept: CARDIOLOGY CLINIC | Age: 89
End: 2024-03-26
Payer: MEDICARE

## 2024-03-26 VITALS
BODY MASS INDEX: 32.59 KG/M2 | WEIGHT: 240.6 LBS | DIASTOLIC BLOOD PRESSURE: 60 MMHG | HEART RATE: 75 BPM | SYSTOLIC BLOOD PRESSURE: 114 MMHG | RESPIRATION RATE: 16 BRPM | HEIGHT: 72 IN

## 2024-03-26 DIAGNOSIS — I34.0 MITRAL VALVE INSUFFICIENCY, UNSPECIFIED ETIOLOGY: ICD-10-CM

## 2024-03-26 DIAGNOSIS — I50.30 DIASTOLIC HEART FAILURE, UNSPECIFIED HF CHRONICITY (HCC): ICD-10-CM

## 2024-03-26 DIAGNOSIS — I48.0 PAF (PAROXYSMAL ATRIAL FIBRILLATION) (HCC): ICD-10-CM

## 2024-03-26 DIAGNOSIS — I10 HTN (HYPERTENSION), BENIGN: Primary | Chronic | ICD-10-CM

## 2024-03-26 PROCEDURE — 93000 ELECTROCARDIOGRAM COMPLETE: CPT

## 2024-03-26 PROCEDURE — 99213 OFFICE O/P EST LOW 20 MIN: CPT

## 2024-03-26 PROCEDURE — 1123F ACP DISCUSS/DSCN MKR DOCD: CPT

## 2024-03-26 RX ORDER — POLYETHYLENE GLYCOL 3350 17 G/17G
17 POWDER, FOR SOLUTION ORAL DAILY PRN
COMMUNITY

## 2024-03-26 NOTE — PATIENT INSTRUCTIONS
Follow up with [facility] with Dr Ruiz to discuss mitral valve evaluation and Watchman.    Continue current medications.    Follow up with Electrophysiology 3/29 and Dr Garcia

## 2024-03-29 ENCOUNTER — OFFICE VISIT (OUTPATIENT)
Dept: NON INVASIVE DIAGNOSTICS | Age: 89
End: 2024-03-29
Payer: MEDICARE

## 2024-03-29 ENCOUNTER — TELEPHONE (OUTPATIENT)
Dept: NON INVASIVE DIAGNOSTICS | Age: 89
End: 2024-03-29

## 2024-03-29 VITALS
RESPIRATION RATE: 16 BRPM | OXYGEN SATURATION: 97 % | BODY MASS INDEX: 32.51 KG/M2 | HEART RATE: 68 BPM | HEIGHT: 72 IN | SYSTOLIC BLOOD PRESSURE: 110 MMHG | WEIGHT: 240 LBS | DIASTOLIC BLOOD PRESSURE: 64 MMHG

## 2024-03-29 DIAGNOSIS — I10 HTN (HYPERTENSION), BENIGN: Chronic | ICD-10-CM

## 2024-03-29 DIAGNOSIS — Z95.2 S/P AVR: Chronic | ICD-10-CM

## 2024-03-29 DIAGNOSIS — I48.0 PAROXYSMAL ATRIAL FIBRILLATION (HCC): Primary | ICD-10-CM

## 2024-03-29 DIAGNOSIS — I49.5 SSS (SICK SINUS SYNDROME) (HCC): Chronic | ICD-10-CM

## 2024-03-29 DIAGNOSIS — I34.0 NONRHEUMATIC MITRAL VALVE REGURGITATION: Chronic | ICD-10-CM

## 2024-03-29 DIAGNOSIS — Z95.0 CARDIAC PACEMAKER IN SITU: ICD-10-CM

## 2024-03-29 PROCEDURE — 1123F ACP DISCUSS/DSCN MKR DOCD: CPT | Performed by: NURSE PRACTITIONER

## 2024-03-29 PROCEDURE — 99214 OFFICE O/P EST MOD 30 MIN: CPT | Performed by: NURSE PRACTITIONER

## 2024-03-29 PROCEDURE — 93280 PM DEVICE PROGR EVAL DUAL: CPT | Performed by: NURSE PRACTITIONER

## 2024-03-29 PROCEDURE — 93000 ELECTROCARDIOGRAM COMPLETE: CPT | Performed by: NURSE PRACTITIONER

## 2024-03-29 NOTE — TELEPHONE ENCOUNTER
New carelink monitor ordered.       Cleo FINLEY,RN   [facility] [place]   Device Clinic

## 2024-03-29 NOTE — PROGRESS NOTES
[facility]  Outpatient Progress Note  Jordon Olmstead  1935  Date of Service: 3/29/2024  PCP: James Hensley DO  Cardiologist: Dr. Garcia  Electrophysiologist: Dr. Arnold         Subjective: Jordon Olmstead is seen for follow-up and management of: Pacemaker    Last seen in the office with Dr. Arnold on 6/3/2022  Generator change on 8/2/2023    PMH as noted below significant for pacemaker in situ since 2014, PAF s/p maze procedure, AVR (bioprosthetic), PE, HTN, COPD, gout, hypothyroidism and anticoagulation on Coumadin.  He underwent generator change in August 2023.  He follows remotely with his device.  He also lives with cardiology and was seen 3 days ago.  Patient has memory loss and is unable to tell me if his daughter was with him in the office 3 days ago and who he saw.  Per cardiology notes, he was recently admitted at [facility] for CHF on 2/26/2024.  He was found to be anemic and received blood transfusions.  He has EGD which showed stigmata of recent bleeding and normal C-scope.  He was admitted locally on 3/13/2024 for anemia and hemoglobin of 7.4, dark stools and lightheadedness.  Coumadin was held.  He was then admitted to [facility] on 3/19/2024 again for dark stools and H&H was stable.  Patient is here alone today in the office and complains of increased shortness of breath on exertion, swelling and lightheadedness with bending over.  He denies any near-syncope or syncope.  Daughter helps with meds -Pt lives with wife and lives over 40 minutes away. He drives and is here alone in the office.        Patient Active Problem List   Diagnosis    HTN (hypertension), benign    Aortic stenosis    Atrial fibrillation (HCC)    COPD (chronic obstructive pulmonary disease) ([facility])    S/P AVR    MR (mitral regurgitation)    SSS (sick sinus syndrome) ([facility])    TIA (transient ischemic attack)    Varicose veins of lower extremity

## 2024-04-04 ENCOUNTER — TELEPHONE (OUTPATIENT)
Dept: CARDIOLOGY CLINIC | Age: 89
End: 2024-04-04

## 2024-05-09 ENCOUNTER — OFFICE VISIT (OUTPATIENT)
Dept: CARDIOTHORACIC SURGERY | Age: 89
End: 2024-05-09
Payer: MEDICARE

## 2024-05-09 VITALS
WEIGHT: 232 LBS | HEART RATE: 72 BPM | DIASTOLIC BLOOD PRESSURE: 86 MMHG | HEIGHT: 72 IN | SYSTOLIC BLOOD PRESSURE: 145 MMHG | RESPIRATION RATE: 24 BRPM | BODY MASS INDEX: 31.42 KG/M2

## 2024-05-09 DIAGNOSIS — I34.0 SEVERE MITRAL REGURGITATION: Primary | ICD-10-CM

## 2024-05-09 PROCEDURE — 99204 OFFICE O/P NEW MOD 45 MIN: CPT | Performed by: THORACIC SURGERY (CARDIOTHORACIC VASCULAR SURGERY)

## 2024-05-09 PROCEDURE — 1123F ACP DISCUSS/DSCN MKR DOCD: CPT | Performed by: THORACIC SURGERY (CARDIOTHORACIC VASCULAR SURGERY)

## 2024-05-09 NOTE — PROGRESS NOTES
[facility]  Visit Note      Patient name: Jordon Olmstead    Reason for consult: mitral regurgitation    Referring Physician: Moy Leal, APRN-CNP    Primary Care Physician: James Hensley DO    Date of service: 5/9/2024    Chief Complaint: mitral regurgitation    HPI: 89 yo male who presents for evaluation of mitral regurgitation. PMH significant for severe AS s/p AVR/MAZE/PRO excision (12/2013 by me), PAF, HTN, HFrEF, pacemaker, GIB/anemia with erosive gastropathy on EGD 3/1/24, diverticulosis and non bleeding external hemorrhoids on Cscope 4/17/24, bilateral PE (2012), former smoker and recent RLE cellulitis    He had recent follow up with local cardiology where he complained of fatigue/poor energy but no cardiac symptoms. Review of echo from [facility] in February suggested mod-severe MR, and he was referred to valve clinic for further evaluation. He missed his first appointment due to admission with RLE cellulitis. He was seen by cardiology at that time with no further cardiac recommendations planned.    TTE 2/28/2024 [facility]: EF 50±5%.  Anterior wall and anterior lateral wall are mildly hypokinetic.  RV size and function normal.  LA severely dilated.  RA dilated.  Moderately severe 3+ MR due to prolapse.  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  Regurgitant orifice 0.24 cm².  MR may be underestimated due to eccentric regurgitant jet.  Bioprosthetic AVR with no regurg.  Peak gradient 15 mmHg, mean 9 mmHg, dimensionless valve index 0.38.    The patient currently denies CP, SOB at rest, new LE edema, N/V, F/C, rigors, chills, orthopnea, PND and syncope.  He reports LUONG especially climbing stairs.    Allergies:   Allergies   Allergen Reactions    Pollen Extract      Other reaction(s): Other: See Comments denied respiratory problems  sneezing       Home medications:    Current Outpatient Medications   Medication Sig Dispense Refill    GUAR GUM PO Take by mouth      polyethylene

## 2024-05-09 NOTE — PATIENT INSTRUCTIONS
We will be in touch to schedule your heart catheterization and transesophageal echocardiogram    Please see your dentist for clearance prior to heart valve intervention. Ask them to fax clearance note once complete

## 2024-05-10 DIAGNOSIS — I34.0 NONRHEUMATIC MITRAL VALVE REGURGITATION: Primary | ICD-10-CM

## 2024-05-13 DIAGNOSIS — Z01.810 PREOP CARDIOVASCULAR EXAM: ICD-10-CM

## 2024-05-13 DIAGNOSIS — I34.0 NONRHEUMATIC MITRAL VALVE REGURGITATION: Primary | ICD-10-CM

## 2024-05-13 RX ORDER — SODIUM CHLORIDE 9 MG/ML
INJECTION, SOLUTION INTRAVENOUS CONTINUOUS
Status: CANCELLED | OUTPATIENT
Start: 2024-06-04

## 2024-05-15 ENCOUNTER — TELEPHONE (OUTPATIENT)
Dept: CARDIOLOGY CLINIC | Age: 89
End: 2024-05-15

## 2024-05-15 NOTE — TELEPHONE ENCOUNTER
HEART CATH & REZA INSTRUCTIONS    I called Jordon & went over both his heart cath & REZA instructions      HEART CATH INSTRUCTIONS    Place: [facility]  Date & Time: 5/30/24 at 7:30 am  Arrival Time: 6:00 am  Preop Labs: Get your preop lab work a few days before the cath at a [facility]   NPO post midnight the night before cath  Take an  mg 1 tab or ASA 81 mg 4 tabs prior to coming to the hosp  Must have a ride home  after the procedure  More detailed instructions mailed to pt      REZA INSTRUCTIONS     Place: [facility]  Date & time of REZA: 6/4/24 at 11:30 am  Arrival Time: 10:30 am  NPO after midnight before REZA  May take your medications with a sip of water the morning of the procedure  Insulin Instructions:   Must have a ride home after the procedure  More detailed instructions mailed to pt  All questions answered

## 2024-05-16 ENCOUNTER — OFFICE VISIT (OUTPATIENT)
Dept: CARDIOLOGY CLINIC | Age: 89
End: 2024-05-16
Payer: MEDICARE

## 2024-05-16 VITALS
BODY MASS INDEX: 31.98 KG/M2 | WEIGHT: 236.1 LBS | RESPIRATION RATE: 16 BRPM | HEART RATE: 68 BPM | HEIGHT: 72 IN | DIASTOLIC BLOOD PRESSURE: 64 MMHG | SYSTOLIC BLOOD PRESSURE: 118 MMHG

## 2024-05-16 DIAGNOSIS — I34.0 MITRAL VALVE INSUFFICIENCY, UNSPECIFIED ETIOLOGY: Primary | ICD-10-CM

## 2024-05-16 PROCEDURE — 1123F ACP DISCUSS/DSCN MKR DOCD: CPT | Performed by: INTERNAL MEDICINE

## 2024-05-16 PROCEDURE — 99214 OFFICE O/P EST MOD 30 MIN: CPT | Performed by: INTERNAL MEDICINE

## 2024-05-16 PROCEDURE — 93000 ELECTROCARDIOGRAM COMPLETE: CPT | Performed by: INTERNAL MEDICINE

## 2024-05-16 RX ORDER — POTASSIUM CHLORIDE 20 MEQ/1
20 TABLET, EXTENDED RELEASE ORAL DAILY
COMMUNITY

## 2024-05-16 RX ORDER — DOCUSATE SODIUM 100 MG/1
100 CAPSULE, LIQUID FILLED ORAL 2 TIMES DAILY
COMMUNITY

## 2024-05-16 RX ORDER — FUROSEMIDE 20 MG/1
20 TABLET ORAL DAILY
COMMUNITY

## 2024-05-16 RX ORDER — DAPAGLIFLOZIN 10 MG/1
10 TABLET, FILM COATED ORAL EVERY MORNING
COMMUNITY
Start: 2024-05-09

## 2024-05-16 NOTE — PROGRESS NOTES
OUTPATIENT CARDIOLOGY FOLLOW-UP    Name: Jordon Olmstead    Age: 89 y.o.    Primary Care Physician: James Hensley DO    Date of Service: 5/16/2024    Chief Complaint:   Chief Complaint   Patient presents with    Atrial Fibrillation     6 week        Interim History:   Mr. Olmstead is a 89-year-old gentleman with history of severe aortic stenosis underwent bioprosthetic AVR on 12/10/2013, also had a maze and left atrial clipping, hypertension and hypothyroidism on hormonal replacement therapy. He had a symptomatic sick sinus syndrome with a history of tachybradycardia syndrome and received Medtronic dual-chamber pacemaker, history of proximal atrial fibrillation with rapid ventricular response and a bilateral pulmonary emboli in July 2012, history of L1 fracture secondary to fall status post kyphoplasty is here for follow-up visit.     He was last seen in the office was in to 9/2.22, since then he was seen in the cardiology office by Moy REYNOSO on 3/26/2024 following recent admission [facility] on 2/26/2024 for CHF and was found to be severely anemic.  He did receive blood transfusion.  He was seen by. Since his last visit, he was hospitalized in April 2024 with cellulitis of the right lower extremity.  He was initiated on Keflex.  He was also noted to have a anemia secondary to GI bleed and was evaluated at [facility] underwent an EGD and colonoscopy and EGD was normal colonoscopy showed diverticulosis and nonbleeding external hemorrhoids.  He was also diagnosed with a left superficial thrombophlebitis involving left upper extremity.  He was also hospitalized in March 2024 with GI bleed hemoglobin was 7.6.  He denies any more bloody stools or black stools.  He still has some swelling of the legs and taking Lasix every day.  He had an echocardiogram at [facility] on 2/28/2024 which showed an EF of 50±5% severely dilated left atrium moderate to severe 3+ mitral regurgitation secondary to prolapse of the anterior and

## 2024-05-16 NOTE — PATIENT INSTRUCTIONS
Continue Eliquis 5 mg p.o. twice daily for anticoagulation  Continue atorvastatin 20 mg p.o. daily  Continue dapagliflozin 10 mg p.o. daily.  Follow up with pcp for INR monitoring.   Follow-up with EP service for pacemaker evaluation.  Continue Protonix 40 mg p.o. daily  Continue furosemide 20 mg p.o. daily and potassium 20 mg p.o. daily.  Rest as per primary service fFollow-up with me in 3 months

## 2024-05-20 ENCOUNTER — TELEPHONE (OUTPATIENT)
Dept: CARDIOLOGY CLINIC | Age: 89
End: 2024-05-20

## 2024-05-20 NOTE — TELEPHONE ENCOUNTER
Martha De La Cruzsondra, Jordon's daughter, called to inform me that she 's Jordon's POA, & nurse and she stumbled upon his heart cath & REZA instructions while she was at his house.  She had no idea he was scheduled for these procedures & moving forward, I go through her.    She asked if I can move the heart cath time back because he will not make the 7:30 am appt since he lives in [place].  Cath time moved to 10:30 am per her request.     She also clarified his medication list & told me he was on Eliquis & Lasix - which were not on his med list when I scheduled his procedures    NEW HEART CATH INSTRUCTIONS    Place: [facility]  Date & Time: 8/30/24 at 10:30  am  Arrival Time: 8:30 am  Preop Labs: Get your preop lab work a few days before the cath at a [facility]   NPO post midnight the night before cath  Last dose of Eliquis: 5/27/24  Diuretic Orders: Hold your Lasix day of cath  Take an  mg 1 tab or ASA 81 mg 4 tabs prior to coming to the hosp  Must have a ride home after the procedure  More detailed instructions mailed to Martha & emailed to [email or web address]      NEW REZA INSTRUCTIONS     Place: [facility]  Date & time of REZA: 6/4/24 at 11:30 am  Arrival Time: 10:30 am  NPO after midnight before REZA  May take your medications with a sip of water the morning of the procedure  Insulin Instructions:   Hold diuretics day of REZA  Must have a ride home after the procedure  More detailed instructions mailed to Martha & emailed to [email or web address]  All questions answered

## 2024-05-22 ENCOUNTER — TELEPHONE (OUTPATIENT)
Age: 89
End: 2024-05-22

## 2024-05-22 NOTE — TELEPHONE ENCOUNTER
REZA INSURANCE AUTHORIZATION    Insurance Company: [employer]    REZA CPT: 26148    ICD 10:  I    Date of Service: 6/4/24    Requesting Provider:  Dr. Ruiz    NPI: [id number]  Tax ID: [id number]    Servicing Provider:  Dr. Ruiz                    NPI: [id number]       Tax ID: [id number]    [facility]  NPI:  [id number]      TaxX ID:  [id number]     [facility]  NPI: [id number]        Tax ID: [id number]     Insurance Auth:   Approved  Approval Dates: 5/16/24 - 11/12/24  Auth #  [id number]

## 2024-05-22 NOTE — TELEPHONE ENCOUNTER
HEART CATH INSURANCE AUTH     Insurance Company: [employer]        [facility] [employer] [facility] Heart Cath: [id number]     Date of Service:  5/30/24     ICD 10 Code: I34.0    Servicing Provider:     Dr. Ruiz        NPI: [id number]     TAX ID: [id number]                          Facility:      [facility]                NPI:  [id number]      TAX ID:  [id number]      Approval Status:   Approved   Approval Dates: 5/16/24 - 11/12/24  Auth #: [id number]

## 2024-05-29 NOTE — PROGRESS NOTES
Attempted to reminded patient of scheduled procedure on 5/30.  No Instructions given mail box full- will keep on trying to reeach pat..

## 2024-05-30 ENCOUNTER — HOSPITAL ENCOUNTER (OUTPATIENT)
Age: 89
Setting detail: OUTPATIENT SURGERY
Discharge: HOME | End: 2024-05-30
Attending: INTERNAL MEDICINE | Admitting: INTERNAL MEDICINE
Payer: MEDICARE

## 2024-05-30 VITALS
OXYGEN SATURATION: 96 % | SYSTOLIC BLOOD PRESSURE: 135 MMHG | HEART RATE: 59 BPM | TEMPERATURE: 97.9 F | BODY MASS INDEX: 31.83 KG/M2 | RESPIRATION RATE: 19 BRPM | HEIGHT: 72 IN | WEIGHT: 235 LBS | DIASTOLIC BLOOD PRESSURE: 72 MMHG

## 2024-05-30 DIAGNOSIS — I34.0 MITRAL VALVE INSUFFICIENCY, UNSPECIFIED ETIOLOGY: ICD-10-CM

## 2024-05-30 LAB
ABO + RH BLD: NORMAL
ANION GAP SERPL CALCULATED.3IONS-SCNC: 10 MMOL/L (ref 7–16)
ARM BAND NUMBER: NORMAL
BLOOD BANK SAMPLE EXPIRATION: NORMAL
BLOOD GROUP ANTIBODIES SERPL: NEGATIVE
BUN SERPL-MCNC: 18 MG/DL (ref 6–23)
CALCIUM SERPL-MCNC: 9.4 MG/DL (ref 8.6–10.2)
CHLORIDE SERPL-SCNC: 102 MMOL/L (ref 98–107)
CO2 SERPL-SCNC: 32 MMOL/L (ref 22–29)
CREAT SERPL-MCNC: 1.1 MG/DL (ref 0.7–1.2)
ECHO BSA: 2.33 M2
ERYTHROCYTE [DISTWIDTH] IN BLOOD BY AUTOMATED COUNT: 18 % (ref 11.5–15)
GFR, ESTIMATED: 65 ML/MIN/1.73M2
GLUCOSE SERPL-MCNC: 135 MG/DL (ref 74–99)
HCT VFR BLD AUTO: 40.9 % (ref 37–54)
HGB BLD-MCNC: 12.1 G/DL (ref 12.5–16.5)
MCH RBC QN AUTO: 25.5 PG (ref 26–35)
MCHC RBC AUTO-ENTMCNC: 29.6 G/DL (ref 32–34.5)
MCV RBC AUTO: 86.3 FL (ref 80–99.9)
PLATELET # BLD AUTO: 262 K/UL (ref 130–450)
PMV BLD AUTO: 8.9 FL (ref 7–12)
POTASSIUM SERPL-SCNC: 3.7 MMOL/L (ref 3.5–5)
RBC # BLD AUTO: 4.74 M/UL (ref 3.8–5.8)
SODIUM SERPL-SCNC: 144 MMOL/L (ref 132–146)
WBC OTHER # BLD: 6.1 K/UL (ref 4.5–11.5)

## 2024-05-30 PROCEDURE — 7100000010 HC PHASE II RECOVERY - FIRST 15 MIN: Performed by: INTERNAL MEDICINE

## 2024-05-30 PROCEDURE — 2709999900 HC NON-CHARGEABLE SUPPLY: Performed by: INTERNAL MEDICINE

## 2024-05-30 PROCEDURE — 6360000002 HC RX W HCPCS: Performed by: INTERNAL MEDICINE

## 2024-05-30 PROCEDURE — 7100000011 HC PHASE II RECOVERY - ADDTL 15 MIN: Performed by: INTERNAL MEDICINE

## 2024-05-30 PROCEDURE — C1769 GUIDE WIRE: HCPCS | Performed by: INTERNAL MEDICINE

## 2024-05-30 PROCEDURE — 2580000003 HC RX 258: Performed by: INTERNAL MEDICINE

## 2024-05-30 PROCEDURE — C1751 CATH, INF, PER/CENT/MIDLINE: HCPCS | Performed by: INTERNAL MEDICINE

## 2024-05-30 PROCEDURE — 36415 COLL VENOUS BLD VENIPUNCTURE: CPT

## 2024-05-30 PROCEDURE — 85027 COMPLETE CBC AUTOMATED: CPT

## 2024-05-30 PROCEDURE — 86850 RBC ANTIBODY SCREEN: CPT

## 2024-05-30 PROCEDURE — 86900 BLOOD TYPING SEROLOGIC ABO: CPT

## 2024-05-30 PROCEDURE — C1894 INTRO/SHEATH, NON-LASER: HCPCS | Performed by: INTERNAL MEDICINE

## 2024-05-30 PROCEDURE — 80048 BASIC METABOLIC PNL TOTAL CA: CPT

## 2024-05-30 PROCEDURE — 6370000000 HC RX 637 (ALT 250 FOR IP): Performed by: INTERNAL MEDICINE

## 2024-05-30 PROCEDURE — 6360000004 HC RX CONTRAST MEDICATION: Performed by: INTERNAL MEDICINE

## 2024-05-30 PROCEDURE — 86901 BLOOD TYPING SEROLOGIC RH(D): CPT

## 2024-05-30 PROCEDURE — 93460 R&L HRT ART/VENTRICLE ANGIO: CPT | Performed by: INTERNAL MEDICINE

## 2024-05-30 PROCEDURE — 2500000003 HC RX 250 WO HCPCS: Performed by: INTERNAL MEDICINE

## 2024-05-30 RX ORDER — FENTANYL CITRATE 50 UG/ML
INJECTION, SOLUTION INTRAMUSCULAR; INTRAVENOUS PRN
Status: DISCONTINUED | OUTPATIENT
Start: 2024-05-30 | End: 2024-05-30 | Stop reason: HOSPADM

## 2024-05-30 RX ORDER — NITROGLYCERIN 20 MG/100ML
INJECTION INTRAVENOUS CONTINUOUS PRN
Status: COMPLETED | OUTPATIENT
Start: 2024-05-30 | End: 2024-05-30

## 2024-05-30 RX ORDER — SODIUM CHLORIDE 9 MG/ML
INJECTION, SOLUTION INTRAVENOUS CONTINUOUS
Status: DISCONTINUED | OUTPATIENT
Start: 2024-05-30 | End: 2024-05-30 | Stop reason: HOSPADM

## 2024-05-30 RX ORDER — SODIUM CHLORIDE 0.9 % (FLUSH) 0.9 %
5-40 SYRINGE (ML) INJECTION PRN
Status: DISCONTINUED | OUTPATIENT
Start: 2024-05-30 | End: 2024-05-30 | Stop reason: HOSPADM

## 2024-05-30 RX ORDER — ACETAMINOPHEN 325 MG/1
650 TABLET ORAL EVERY 4 HOURS PRN
Status: DISCONTINUED | OUTPATIENT
Start: 2024-05-30 | End: 2024-05-30 | Stop reason: HOSPADM

## 2024-05-30 RX ORDER — SODIUM CHLORIDE 9 MG/ML
INJECTION, SOLUTION INTRAVENOUS PRN
Status: DISCONTINUED | OUTPATIENT
Start: 2024-05-30 | End: 2024-05-30 | Stop reason: HOSPADM

## 2024-05-30 RX ORDER — ASPIRIN 325 MG
325 TABLET ORAL ONCE
Status: COMPLETED | OUTPATIENT
Start: 2024-05-30 | End: 2024-05-30

## 2024-05-30 RX ORDER — HEPARIN SODIUM 10000 [USP'U]/ML
INJECTION, SOLUTION INTRAVENOUS; SUBCUTANEOUS PRN
Status: DISCONTINUED | OUTPATIENT
Start: 2024-05-30 | End: 2024-05-30 | Stop reason: HOSPADM

## 2024-05-30 RX ORDER — SODIUM CHLORIDE 0.9 % (FLUSH) 0.9 %
5-40 SYRINGE (ML) INJECTION EVERY 12 HOURS SCHEDULED
Status: DISCONTINUED | OUTPATIENT
Start: 2024-05-30 | End: 2024-05-30 | Stop reason: HOSPADM

## 2024-05-30 RX ORDER — VERAPAMIL HYDROCHLORIDE 2.5 MG/ML
INJECTION, SOLUTION INTRAVENOUS PRN
Status: DISCONTINUED | OUTPATIENT
Start: 2024-05-30 | End: 2024-05-30 | Stop reason: HOSPADM

## 2024-05-30 RX ORDER — MIDAZOLAM HYDROCHLORIDE 1 MG/ML
INJECTION INTRAMUSCULAR; INTRAVENOUS PRN
Status: DISCONTINUED | OUTPATIENT
Start: 2024-05-30 | End: 2024-05-30 | Stop reason: HOSPADM

## 2024-05-30 RX ADMIN — SODIUM CHLORIDE 20 ML/HR: 9 INJECTION, SOLUTION INTRAVENOUS at 09:25

## 2024-05-30 RX ADMIN — ASPIRIN 325 MG: 325 TABLET ORAL at 09:24

## 2024-05-30 ASSESSMENT — ENCOUNTER SYMPTOMS
VOMITING: 0
ABDOMINAL PAIN: 0
COUGH: 0
SHORTNESS OF BREATH: 0
CHEST TIGHTNESS: 0
BACK PAIN: 0
BLOOD IN STOOL: 0
NAUSEA: 0

## 2024-05-30 NOTE — PROGRESS NOTES
Discharge instructions provided to patient. Information regarding medications, care of the radial cardiac cath site, and follow up appointments given.    IV removed and dressing applied.    Importance of taking medications as prescribed discussed at length with verbalized understanding.    Patient was discharged from the floor with the following belongings:   Shirt  Pants  Shoes  Socks  Undergarments  Jacket  Hat  Cell phone  Wallace  Discharge paperwork    Patient was transported to the main [facility] via wheelchair by CVL staff, and was assisted into the vehicle as needed.

## 2024-05-30 NOTE — PROGRESS NOTES
Patient arrived to CVL holding area bay 9. Patient A&Ox3, right radial and right brachial cath site stable, VSS, tolerating PO well. See flowsheets for details.

## 2024-05-30 NOTE — DISCHARGE INSTRUCTIONS
POST-CATH  RADIAL DISCHARGE INSTRUCTIONS:  Please follow instructions closely for prevention of complications.     INSTRUCTIONS FOR CARE OF CATHETERIZATION SITE: RADIAL (WRIST) APPROACH:    DO NOT BEND wrist for 48 hours  DO NOT PUSH with affected wrist for 48 hrs.  DO NOT submerge wrist in water for 3 days  NO blood pressure, IV or blood work in affected arm for 3- 5 days    KEEP SPLINT (ARMBOARD) ON UNTIL TOMORROW MORNING  Remove dressing from wrist tomorrow morning. Gently cleanse, pat dry, apply band aid for 24 hours.    Call your physician if you notice:      *Increase in pain at catheterization site      *Increase in swelling at catheterization site      *Redness or drainage at the catheterization site      *Numbness, tingling or cramping in the catheterization arm at rest or with activity    CALL 911 if you have active bleeding from your catheterization site.   DO NOT DRIVE YOURSELF TO THE HOSPITAL    Drink 3-4 extra glasses of water today    No driving, or working for 48 hrs    Continue low fat diet.

## 2024-05-30 NOTE — H&P
Facility-Administered Medications:     0.9 % sodium chloride infusion, , IntraVENous, Continuous, Juanito Ruiz MD, Last Rate: 20 mL/hr at 24, 20 mL/hr at 24    Allergies:  Pollen extract    Social History:    Social History     Socioeconomic History    Marital status:      Spouse name: Not on file    Number of children: Not on file    Years of education: Not on file    Highest education level: Not on file   Occupational History    Not on file   Tobacco Use    Smoking status: Former     Current packs/day: 0.00     Average packs/day: 0.3 packs/day for 2.0 years (0.6 ttl pk-yrs)     Types: Cigarettes     Start date: 1958     Quit date: 1960     Years since quittin.4    Smokeless tobacco: Never   Vaping Use    Vaping Use: Never used   Substance and Sexual Activity    Alcohol use: Yes     Alcohol/week: 5.8 standard drinks of alcohol     Comment: 3 drinks a week    Drug use: Never    Sexual activity: Not on file   Other Topics Concern    Not on file   Social History Narrative    ** Merged History Encounter **          Social Determinants of Health     Financial Resource Strain: Not on file   Food Insecurity: No Food Insecurity (3/12/2024)    Hunger Vital Sign     Worried About Running Out of Food in the Last Year: Never true     Ran Out of Food in the Last Year: Never true   Transportation Needs: No Transportation Needs (3/12/2024)    PRAPARE - Transportation     Lack of Transportation (Medical): No     Lack of Transportation (Non-Medical): No   Physical Activity: Not on file   Stress: Not on file   Social Connections: Not on file   Intimate Partner Violence: Not on file   Housing Stability: Low Risk  (3/12/2024)    Housing Stability Vital Sign     Unable to Pay for Housing in the Last Year: No     Number of Places Lived in the Last Year: 1     Unstable Housing in the Last Year: No        Family History:   Family History   Problem Relation Age of Onset    COPD Mother

## 2024-06-03 ENCOUNTER — TELEPHONE (OUTPATIENT)
Age: 89
End: 2024-06-03

## 2024-06-04 ENCOUNTER — ANESTHESIA (OUTPATIENT)
Age: 89
End: 2024-06-04
Payer: MEDICARE

## 2024-06-04 ENCOUNTER — ANESTHESIA EVENT (OUTPATIENT)
Age: 89
End: 2024-06-04
Payer: MEDICARE

## 2024-06-04 ENCOUNTER — HOSPITAL ENCOUNTER (OUTPATIENT)
Age: 89
Discharge: HOME | End: 2024-06-06
Attending: THORACIC SURGERY (CARDIOTHORACIC VASCULAR SURGERY)
Payer: MEDICARE

## 2024-06-04 VITALS
RESPIRATION RATE: 19 BRPM | SYSTOLIC BLOOD PRESSURE: 160 MMHG | HEIGHT: 72 IN | DIASTOLIC BLOOD PRESSURE: 81 MMHG | OXYGEN SATURATION: 95 % | TEMPERATURE: 96.8 F | BODY MASS INDEX: 31.83 KG/M2 | HEART RATE: 60 BPM | WEIGHT: 235 LBS

## 2024-06-04 DIAGNOSIS — I34.0 NONRHEUMATIC MITRAL VALVE REGURGITATION: ICD-10-CM

## 2024-06-04 PROCEDURE — 7100000011 HC PHASE II RECOVERY - ADDTL 15 MIN: Performed by: INTERNAL MEDICINE

## 2024-06-04 PROCEDURE — 6360000002 HC RX W HCPCS: Performed by: NURSE ANESTHETIST, CERTIFIED REGISTERED

## 2024-06-04 PROCEDURE — 2580000003 HC RX 258: Performed by: NURSE ANESTHETIST, CERTIFIED REGISTERED

## 2024-06-04 PROCEDURE — 3700000001 HC ADD 15 MINUTES (ANESTHESIA): Performed by: INTERNAL MEDICINE

## 2024-06-04 PROCEDURE — 7100000010 HC PHASE II RECOVERY - FIRST 15 MIN: Performed by: INTERNAL MEDICINE

## 2024-06-04 PROCEDURE — 3700000000 HC ANESTHESIA ATTENDED CARE: Performed by: INTERNAL MEDICINE

## 2024-06-04 PROCEDURE — 93320 DOPPLER ECHO COMPLETE: CPT

## 2024-06-04 RX ORDER — SODIUM CHLORIDE 9 MG/ML
INJECTION, SOLUTION INTRAVENOUS CONTINUOUS PRN
Status: DISCONTINUED | OUTPATIENT
Start: 2024-06-04 | End: 2024-06-04 | Stop reason: SDUPTHER

## 2024-06-04 RX ORDER — PROPOFOL 10 MG/ML
INJECTION, EMULSION INTRAVENOUS PRN
Status: DISCONTINUED | OUTPATIENT
Start: 2024-06-04 | End: 2024-06-04 | Stop reason: SDUPTHER

## 2024-06-04 RX ADMIN — PROPOFOL 180 MG: 10 INJECTION, EMULSION INTRAVENOUS at 13:53

## 2024-06-04 RX ADMIN — SODIUM CHLORIDE: 9 INJECTION, SOLUTION INTRAVENOUS at 13:43

## 2024-06-04 ASSESSMENT — LIFESTYLE VARIABLES: SMOKING_STATUS: 0

## 2024-06-04 NOTE — PROGRESS NOTES
At time of discharge the patient was voicing no complaints.  The patient and family had no unanswered questions, and verbalized readiness for discharge.  The patient was able to dress self without assistance and was ambulatory without assistance.  Patient provided wheelchair and taken down to 1st floor, daughter driving him home.

## 2024-06-04 NOTE — ANESTHESIA PRE PROCEDURE
(COLACE) 100 MG capsule Take 1 capsule by mouth 2 times daily      atorvastatin (LIPITOR) 20 MG tablet Take 1 tablet by mouth at bedtime      escitalopram (LEXAPRO) 20 MG tablet Take 1 tablet by mouth daily      pantoprazole (PROTONIX) 40 MG tablet Take 1 tablet by mouth daily      gabapentin (NEURONTIN) 100 MG capsule Take 4 capsules by mouth 4 times daily.      levothyroxine (SYNTHROID) 25 MCG tablet Take 1 tablet by mouth nightly      magnesium oxide (MAG-OX) 400 (240 MG) MG tablet Take 1 tablet by mouth daily. 30 tablet 3     No current facility-administered medications for this encounter.       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    Cardiac pacemaker in situ Z95.0    Acute maxillary sinusitis J01.00    Acute prostatitis N41.0    Amaurosis H54.7    Aortic valve disorder I35.9    Arthralgia of lower leg M25.569    Arthritis M19.90    Ataxia R27.0    Benign prostatic hyperplasia without urinary obstruction N40.0    Dysarthria R47.1    Essential tremor G25.0    Gout M10.9    Hard, firm prostate R39.89    Hyperlipidemia E78.5    Hypothyroidism E03.9    Intervertebral cervical disc disorder with myelopathy, 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

## 2024-06-04 NOTE — H&P
OUTPATIENT CARDIOLOGY FOLLOW-UP    Name: Jordon Olmstead    Age: 89 y.o.    Primary Care Physician: Abadie, Katarina, MD    Date of Service: 6/4/2024    Chief Complaint:   No chief complaint on file.      Interim History:   Mr. Olmstead is a 89-year-old gentleman with history of severe aortic stenosis underwent bioprosthetic AVR on 12/10/2013, also had a maze and left atrial clipping, hypertension and hypothyroidism on hormonal replacement therapy. He had a symptomatic sick sinus syndrome with a history of tachybradycardia syndrome and received Medtronic dual-chamber pacemaker, history of proximal atrial fibrillation with rapid ventricular response and a bilateral pulmonary emboli in July 2012, history of L1 fracture secondary to fall status post kyphoplasty is here for follow-up visit.     He was last seen in the office was in to 9/2.22, since then he was seen in the cardiology office by Moy REYNOSO on 3/26/2024 following recent admission [facility] on 2/26/2024 for CHF and was found to be severely anemic.  He did receive blood transfusion.  He was seen by. Since his last visit, he was hospitalized in April 2024 with cellulitis of the right lower extremity.  He was initiated on Keflex.  He was also noted to have a anemia secondary to GI bleed and was evaluated at [facility] underwent an EGD and colonoscopy and EGD was normal colonoscopy showed diverticulosis and nonbleeding external hemorrhoids.  He was also diagnosed with a left superficial thrombophlebitis involving left upper extremity.  He was also hospitalized in March 2024 with GI bleed hemoglobin was 7.6.  He denies any more bloody stools or black stools.  He still has some swelling of the legs and taking Lasix every day.  He had an echocardiogram at [facility] on 2/28/2024 which showed an EF of 50±5% severely dilated left atrium moderate to severe 3+ mitral regurgitation secondary to prolapse of the anterior and posterior mitral leaflets ERO of 0.24.  Status post

## 2024-06-05 NOTE — ANESTHESIA POSTPROCEDURE EVALUATION
Department of Anesthesiology  Postprocedure Note    Patient: Jordon Olmstead  MRN: [id number]  YOB: 1935  Date of evaluation: 6/5/2024    Procedure Summary       Date: 06/04/24 Room / Location: [facility] Cardiac Cath Lab; [facility] Noninvasive Cardiology    Anesthesia Start: 1343 Anesthesia Stop: 1414    Procedure: REZA (PRN CONTRAST/BUBBLE/3D) Diagnosis: Nonrheumatic mitral valve regurgitation    Scheduled Providers: Kimberlyn Winston MD Responsible Provider: Kimberlyn Winston MD    Anesthesia Type: MAC ASA Status: 3            Anesthesia Type: MAC    Molly Phase I:      Molly Phase II:      Anesthesia Post Evaluation    Patient location during evaluation: PACU  Patient participation: complete - patient participated  Level of consciousness: awake and alert  Airway patency: patent  Nausea & Vomiting: no nausea and no vomiting  Cardiovascular status: blood pressure returned to baseline and hemodynamically stable  Respiratory status: acceptable and spontaneous ventilation  Hydration status: euvolemic  Multimodal analgesia pain management approach  Pain management: adequate    No notable events documented.

## 2024-06-06 LAB
ECHO BSA: 2.33 M2
ECHO LV EF PHYSICIAN: 55 %
ECHO MV MAX VELOCITY: 1.1 M/S
ECHO MV MEAN GRADIENT: 2 MMHG
ECHO MV MEAN VELOCITY: 0.5 M/S
ECHO MV PEAK GRADIENT: 5 MMHG
ECHO MV VTI: 18.5 CM

## 2024-06-06 PROCEDURE — 93312 ECHO TRANSESOPHAGEAL: CPT | Performed by: INTERNAL MEDICINE

## 2024-06-06 PROCEDURE — 93320 DOPPLER ECHO COMPLETE: CPT | Performed by: INTERNAL MEDICINE

## 2024-06-06 PROCEDURE — 93319 3D ECHO IMG CGEN CAR ANOMAL: CPT | Performed by: INTERNAL MEDICINE

## 2024-06-12 ENCOUNTER — CLINICAL DOCUMENTATION (OUTPATIENT)
Dept: CARDIOLOGY | Age: 89
End: 2024-06-12

## 2024-06-12 NOTE — PROGRESS NOTES
[facility] STRUCTURAL HEART & VALVE CENTER  Multi-Disciplinary Team Note    Jordon WRIGHT Aysha / 1935 (89 y.o.) / [id number]     TEAM MEMBERS PRESENT: Dr. Juanito Ruiz, Dr. Raad Mcconnell, Dr. Tomas Montoya, Dr. Marylu Greene, Riri Mendoza PA-C, Maia Smith RN    1o DIAGNOSIS:Severe mitral regurgitation      PROCEDURE PLANNED:  NICOLE using Mitraclip  PROCEDURE DATE:  TBD  DEVICE PLANNED:  Mitraclip    CARE TEAM MEMBERS  1. PCP : Abadie, Katarina, MD   2. Primary Cardiologist : Dr. Garcia    PERTINENT RADIOGRAPHIC/DIAGNOSTICS     TTE 2/28/24 ([facility]):  CONCLUSIONS:   - Exam indication: Evaluation of known heart failure to guide therapy   - The left ventricle is normal in size. Left ventricular systolic function is   mildly decreased. EF = 50 ± 5% (2D biplane) Left ventricular diastolic function   was not evaluated due to >2+ MR.   - Right ventricular systolic function is normal.   - The left atrial cavity is severely dilated.   - The right atrial cavity is dilated.   - There is moderately severe (3+) mitral valve regurgitation due to prolapse.   Regurgitant orifice area (PISA) is 0.24 cm². MR may be underestimated due to   eccentric regurgitant jet.   - Tricuspid aortic valve. bioprosthetic prosthetic aortic valve. There is no   aortic valve regurgitation. The peak gradient is 15 mmHg, the mean gradient is 9   mmHg and the dimensionless valve index is 0.38.   - The patient has not had a prior CC echocardiographic exam for comparison.     R/L cath 5/30/24:  Dominance: Right  Coronary arteries:  No evidence of obstructive CAD  LVEDP: 13 mmHg  No significant peak to peak gradient across aortic valve on LV-AO pullback  Right heart cath:  PA 29/29 mm (mean PAP 23 mmHg)  RV 29/11 mmHg  RA 11 mmHg  PCWP 18 mmHg  Jose Antonio cardiac output 7.5 L/min.  Cardiac index 3.2 L/min/m².    REZA 6/6/24:    Technically difficult study.    Left Ventricle: Normal left ventricular systolic function. EF by visual approximation is >

## 2024-07-24 ENCOUNTER — TELEPHONE (OUTPATIENT)
Dept: CARDIOLOGY | Age: 89
End: 2024-07-24

## 2024-07-29 ENCOUNTER — CLINICAL DOCUMENTATION (OUTPATIENT)
Dept: CARDIOLOGY | Age: 89
End: 2024-07-29

## 2024-07-29 ENCOUNTER — PREP FOR PROCEDURE (OUTPATIENT)
Dept: CARDIOLOGY | Age: 89
End: 2024-07-29

## 2024-07-29 DIAGNOSIS — I34.0 NONRHEUMATIC MITRAL VALVE REGURGITATION: Primary | ICD-10-CM

## 2024-07-29 DIAGNOSIS — R09.89 SYMPTOMS INVOLVING CARDIOVASCULAR SYSTEM: ICD-10-CM

## 2024-07-29 RX ORDER — SODIUM CHLORIDE 9 MG/ML
INJECTION, SOLUTION INTRAVENOUS PRN
Status: CANCELLED | OUTPATIENT
Start: 2024-07-29

## 2024-07-29 RX ORDER — SODIUM CHLORIDE 9 MG/ML
INJECTION, SOLUTION INTRAVENOUS CONTINUOUS
Status: CANCELLED | OUTPATIENT
Start: 2024-07-29

## 2024-07-29 RX ORDER — SODIUM CHLORIDE 0.9 % (FLUSH) 0.9 %
5-40 SYRINGE (ML) INJECTION PRN
Status: CANCELLED | OUTPATIENT
Start: 2024-07-29

## 2024-07-29 RX ORDER — MUPIROCIN 20 MG/G
OINTMENT TOPICAL ONCE
Status: CANCELLED | OUTPATIENT
Start: 2024-07-29 | End: 2024-07-29

## 2024-07-29 RX ORDER — SODIUM CHLORIDE 0.9 % (FLUSH) 0.9 %
5-40 SYRINGE (ML) INJECTION EVERY 12 HOURS SCHEDULED
Status: CANCELLED | OUTPATIENT
Start: 2024-07-29

## 2024-07-29 RX ORDER — ASPIRIN 325 MG
325 TABLET, DELAYED RELEASE (ENTERIC COATED) ORAL ONCE
Status: CANCELLED | OUTPATIENT
Start: 2024-07-29

## 2024-07-30 VITALS — HEIGHT: 72 IN | BODY MASS INDEX: 31.87 KG/M2

## 2024-07-30 RX ORDER — PROPRANOLOL HCL 60 MG
60 CAPSULE, EXTENDED RELEASE 24HR ORAL DAILY
COMMUNITY

## 2024-07-30 RX ORDER — GABAPENTIN 400 MG/1
400 CAPSULE ORAL 4 TIMES DAILY
COMMUNITY
Start: 2024-06-25

## 2024-07-30 RX ORDER — FUROSEMIDE 40 MG/1
40 TABLET ORAL DAILY
COMMUNITY
Start: 2024-07-18

## 2024-07-30 RX ORDER — BACLOFEN 20 MG
500 TABLET ORAL DAILY
COMMUNITY

## 2024-07-30 RX ORDER — CHLORHEXIDINE GLUCONATE ORAL RINSE 1.2 MG/ML
15 SOLUTION DENTAL 2 TIMES DAILY
COMMUNITY
Start: 2024-07-19

## 2024-07-30 NOTE — PROGRESS NOTES
[facility]   PRE-ADMISSION TESTING GENERAL INSTRUCTIONS  PAT Phone Number: [phone]      GENERAL INSTRUCTIONS:    [x] Hibiclens shower the night before AND the morning of surgery.   -Do not use Hibiclens on your face or head.  [x] Do not wear makeup, lotions, powders, deodorant the morning of surgery.  [x] Nothing to eat or drink after midnight. This includes no gum, candy, mints or water.  [x] You may brush your teeth, gargle, but do NOT swallow water.   [x] No tobacco products, illegal drugs, or alcohol within 24 hours of your surgery.  [x] Jewelry or valuables should not be brought to the hospital. All body and/or tongue piercing's must be removed prior to arriving to hospital. No contact lens or hair pins.   [x] Arrange transportation with a responsible adult  to and from the hospital. Arrange for someone to be with you for the remainder of the day and for 24 hours after your procedure due to having had anesthesia.          -Who will be your  for transportation? Smith  [x] Bring insurance card and photo ID.  [x] Bring copy of living will or healthcare power of  papers to be placed in your electronic record.  [x] Transfusion (Green) Bracelet: Please bring with you to hospital, day of surgery.     PARKING INSTRUCTIONS:     [x] ARRIVAL DATE & TIME: 8/7 @ 0645am  [x] Times are subject to change. We will contact you the business day before surgery if that were to occur.  [x] Enter into the [facility] Entrance. Two people may accompany you. Masks are not required.  [x] Parking Lot \"I\" is where you will park. It is located on the corner of [address] and [address]. The entrance is on [address].   Only one vehicle - per patient, is permitted in parking lot.   Upon entering the parking lot, a voucher ticket will print.    EDUCATION INSTRUCTIONS:           [x] Pre-admission Testing educational folder given  [x] Incentive Spirometry,coughing & deep

## 2024-08-01 ENCOUNTER — HOSPITAL ENCOUNTER (OUTPATIENT)
Dept: PREADMISSION TESTING | Age: 89
Discharge: HOME | End: 2024-08-01
Payer: MEDICARE

## 2024-08-01 ENCOUNTER — HOSPITAL ENCOUNTER (OUTPATIENT)
Dept: PREADMISSION TESTING | Age: 89
Discharge: HOME | End: 2024-08-01

## 2024-08-01 ENCOUNTER — OFFICE VISIT (OUTPATIENT)
Dept: CARDIOLOGY CLINIC | Age: 89
End: 2024-08-01
Payer: MEDICARE

## 2024-08-01 ENCOUNTER — HOSPITAL ENCOUNTER (OUTPATIENT)
Dept: PULMONOLOGY | Age: 89
Discharge: HOME | End: 2024-08-01
Attending: INTERNAL MEDICINE
Payer: MEDICARE

## 2024-08-01 ENCOUNTER — HOSPITAL ENCOUNTER (OUTPATIENT)
Dept: ULTRASOUND IMAGING | Age: 89
Discharge: HOME | End: 2024-08-03
Attending: INTERNAL MEDICINE
Payer: MEDICARE

## 2024-08-01 VITALS
BODY MASS INDEX: 32.78 KG/M2 | HEIGHT: 72 IN | SYSTOLIC BLOOD PRESSURE: 154 MMHG | WEIGHT: 242 LBS | RESPIRATION RATE: 22 BRPM | DIASTOLIC BLOOD PRESSURE: 85 MMHG | HEART RATE: 71 BPM

## 2024-08-01 VITALS
WEIGHT: 244.3 LBS | RESPIRATION RATE: 16 BRPM | DIASTOLIC BLOOD PRESSURE: 77 MMHG | HEART RATE: 74 BPM | SYSTOLIC BLOOD PRESSURE: 156 MMHG | HEIGHT: 72 IN | TEMPERATURE: 97.3 F | BODY MASS INDEX: 33.09 KG/M2 | OXYGEN SATURATION: 93 %

## 2024-08-01 DIAGNOSIS — I34.0 NONRHEUMATIC MITRAL VALVE REGURGITATION: Primary | ICD-10-CM

## 2024-08-01 DIAGNOSIS — I34.0 NONRHEUMATIC MITRAL VALVE REGURGITATION: ICD-10-CM

## 2024-08-01 DIAGNOSIS — R09.89 SYMPTOMS INVOLVING CARDIOVASCULAR SYSTEM: ICD-10-CM

## 2024-08-01 LAB
ABO + RH BLD: NORMAL
ALP SERPL-CCNC: 123 U/L (ref 40–129)
ALT SERPL-CCNC: 6 U/L (ref 0–40)
ANION GAP SERPL CALCULATED.3IONS-SCNC: 10 MMOL/L (ref 7–16)
ARM BAND NUMBER: NORMAL
AST SERPL-CCNC: 17 U/L (ref 0–39)
BILIRUB SERPL-MCNC: 0.5 MG/DL (ref 0–1.2)
BILIRUB UR QL STRIP: NEGATIVE
BLOOD BANK SAMPLE EXPIRATION: NORMAL
BLOOD GROUP ANTIBODIES SERPL: NEGATIVE
BUN SERPL-MCNC: 20 MG/DL (ref 6–23)
CALCIUM SERPL-MCNC: 8.9 MG/DL (ref 8.6–10.2)
CHLORIDE SERPL-SCNC: 100 MMOL/L (ref 98–107)
CLARITY UR: CLEAR
COLOR UR: YELLOW
CREAT SERPL-MCNC: 1.1 MG/DL (ref 0.7–1.2)
ERYTHROCYTE [DISTWIDTH] IN BLOOD BY AUTOMATED COUNT: 16.3 % (ref 11.5–15)
GFR, ESTIMATED: 66 ML/MIN/1.73M2
GLUCOSE SERPL-MCNC: 174 MG/DL (ref 74–99)
GLUCOSE UR STRIP-MCNC: >=1000 MG/DL
HBA1C MFR BLD: 6.6 % (ref 4–5.6)
HCT VFR BLD AUTO: 33.4 % (ref 37–54)
HGB BLD-MCNC: 10.2 G/DL (ref 12.5–16.5)
HGB UR QL STRIP.AUTO: ABNORMAL
INR PPP: 1.3
KETONES UR STRIP-MCNC: NEGATIVE MG/DL
LEUKOCYTE ESTERASE UR QL STRIP: NEGATIVE
MCH RBC QN AUTO: 26.1 PG (ref 26–35)
NITRITE UR QL STRIP: NEGATIVE
PARTIAL THROMBOPLASTIN TIME: 39.4 SEC (ref 24.5–35.1)
PH UR STRIP: 6 [PH] (ref 5–9)
PLATELET # BLD AUTO: 202 K/UL (ref 130–450)
PMV BLD AUTO: 9.2 FL (ref 7–12)
POTASSIUM SERPL-SCNC: 3.9 MMOL/L (ref 3.5–5)
PROT SERPL-MCNC: 7.6 G/DL (ref 6.4–8.3)
PROT UR STRIP-MCNC: ABNORMAL MG/DL
PROTHROMBIN TIME: 14.3 SEC (ref 9.3–12.4)
RBC # BLD AUTO: 3.91 M/UL (ref 3.8–5.8)
SODIUM SERPL-SCNC: 141 MMOL/L (ref 132–146)
SP GR UR STRIP: 1.02 (ref 1–1.03)
UROBILINOGEN UR STRIP-ACNC: 0.2 EU/DL (ref 0–1)
WBC OTHER # BLD: 5.8 K/UL (ref 4.5–11.5)

## 2024-08-01 PROCEDURE — 83036 HEMOGLOBIN GLYCOSYLATED A1C: CPT

## 2024-08-01 PROCEDURE — 85730 THROMBOPLASTIN TIME PARTIAL: CPT

## 2024-08-01 PROCEDURE — 85610 PROTHROMBIN TIME: CPT

## 2024-08-01 PROCEDURE — 99205 OFFICE O/P NEW HI 60 MIN: CPT | Performed by: INTERNAL MEDICINE

## 2024-08-01 PROCEDURE — 86901 BLOOD TYPING SEROLOGIC RH(D): CPT

## 2024-08-01 PROCEDURE — 80053 COMPREHEN METABOLIC PANEL: CPT

## 2024-08-01 PROCEDURE — 36415 COLL VENOUS BLD VENIPUNCTURE: CPT

## 2024-08-01 PROCEDURE — 87081 CULTURE SCREEN ONLY: CPT

## 2024-08-01 PROCEDURE — 86900 BLOOD TYPING SEROLOGIC ABO: CPT

## 2024-08-01 PROCEDURE — 87086 URINE CULTURE/COLONY COUNT: CPT

## 2024-08-01 PROCEDURE — 85027 COMPLETE CBC AUTOMATED: CPT

## 2024-08-01 PROCEDURE — 94375 RESPIRATORY FLOW VOLUME LOOP: CPT

## 2024-08-01 PROCEDURE — 1123F ACP DISCUSS/DSCN MKR DOCD: CPT | Performed by: INTERNAL MEDICINE

## 2024-08-01 PROCEDURE — 94729 DIFFUSING CAPACITY: CPT

## 2024-08-01 PROCEDURE — 93880 EXTRACRANIAL BILAT STUDY: CPT

## 2024-08-01 PROCEDURE — 86850 RBC ANTIBODY SCREEN: CPT

## 2024-08-01 ASSESSMENT — ENCOUNTER SYMPTOMS
CHEST TIGHTNESS: 0
VOMITING: 0
BLOOD IN STOOL: 0
SHORTNESS OF BREATH: 1
COUGH: 0
ABDOMINAL PAIN: 0
BACK PAIN: 0
NAUSEA: 0

## 2024-08-01 NOTE — PROGRESS NOTES
fourth intercostal Space  LUNGS: Clear to auscultation bilaterally, no wheezing.    ABDOMEN: Abdomen is soft and not distended. No tenderness.    EXTREMITIES: There is 3+ pedal edema.   MUSCULOSKELETAL: No joint swelling. Normal range of motion    SKIN: Skin is moist. No ulcers or lesions.   NEUROLOGICAL: No evidence of obvious neurological deficits.    PSYCHOLOGICAL: Patient is in normal mood.        PERTINENT RADIOGRAPHIC/DIAGNOSTICS:     TTE 2/28/24 ([facility]):   CONCLUSIONS:   - Exam indication: Evaluation of known heart failure to guide therapy   - The left ventricle is normal in size. Left ventricular systolic function is   mildly decreased. EF = 50 ± 5% (2D biplane) Left ventricular diastolic function   was not evaluated due to >2+ MR.   - Right ventricular systolic function is normal.   - The left atrial cavity is severely dilated.   - The right atrial cavity is dilated.   - There is moderately severe (3+) mitral valve regurgitation due to prolapse.   Regurgitant orifice area (PISA) is 0.24 cm². MR may be underestimated due to   eccentric regurgitant jet.   - Tricuspid aortic valve. bioprosthetic prosthetic aortic valve. There is no   aortic valve regurgitation. The peak gradient is 15 mmHg, the mean gradient is 9   mmHg and the dimensionless valve index is 0.38.     REZA 6/6/24:    Technically difficult study.    Left Ventricle: Normal left ventricular systolic function. EF by visual approximation is > 55%. Left ventricle size is normal.    Left Atrium: Left atrium is dilated. No left atrial appendage thrombus noted.    Right Ventricle: Right ventricle is mildly dilated. Low normal systolic function.    Aortic Valve: Bioprosthetic valve. No regurgitation. No significant stenosis.    Mitral Valve: Mitral annular calcification. Severe regurgitation with a wall-impinging anteriorly-directed jet. MV mean gradient is 2 mmHg.    Heart cath 5/30/24:  Dominance: Right  Coronary arteries:  No evidence of obstructive

## 2024-08-02 LAB
MICROORGANISM SPEC CULT: NORMAL
SERVICE CMNT-IMP: ABNORMAL
SPECIMEN DESCRIPTION: ABNORMAL

## 2024-08-02 NOTE — PROCEDURES
[facility]              [address] [place] [address]                           PULMONARY FUNCTION      PATIENT NAME: EMMETT LILLY            : 1935  MED REC NO: [id number]                        ROOM:   ACCOUNT NO: [id number]                       ADMIT DATE: 2024  PROVIDER: Stone Khan DO      DATE OF PROCEDURE: 2024    INDICATION:  89-year-old male, 72 inches, 242 pounds.    FINDINGS:  Spirometry reveals airflow obstruction with a forced vital capacity of 2.92 L, 75% of predicted with an FEV1 of 1.67 L, 59% of predicted with an FEV1/FVC ratio of 57%, confirming airflow obstruction.  Mid flow rates of 39% of predicted with a max voluntary ventilation at 47 L/minute, 43% of predicted.  Diffusing capacity is 14.01 mL/minute per mmHg, which is only 56% of predicted.  There is notching of the expiratory flow limb.    IMPRESSION:  There is moderate airflow obstruction (GOLD II COPD) with moderate loss in gas transfer consistent with destruction of the alveolar capillary bed.  The notching in the expiratory flow limb along with the patient's recorded weight may indicate obstructive sleep apnea in component to airway collapse during expiratory phase.  Clinical correlation encouraged.          STONE KHAN DO      D:  2024 17:29:40     T:  2024 14:25:02     SHANA/CONCETTA  Job #:  [id number]     Doc#:  [id number]

## 2024-08-05 NOTE — H&P
STRUCTURAL CARDIOLOGY INPATIENT HISTORY AND PHYSICAL EXAMINATION  NOTE       PATIENT DEMOGRAPHICS:       NAME: Jordon Olmstead   YOB: 1935   AGE: 89 y.o.   MEDICAL RECORD NUMBER: [id number]           ADMISSION INFORMATION:  DATE OF ADMISSION: 8/7/2024     PRINCIPLE DIAGNOSIS: Severe mitral regurgitation        CARE TEAM MEMBERS:   PCP : Abadie, Katarina, MD     PRIMARY CARDIOLOGIST:  Dr. Garcia   REFERRING PROVIDER: FLORIN Moon           HISTORY OF PRESENT ILLNESS:    Jordon Olmstead is a 89 y.o. male referred by YOUNG Moon who presents today for planned Transcatheter esra-un-ocqq mitral repair (NICOLE) using Mitraclip. Past medical history of severe AS s/p AVR/MAZE/PRO excision (12/2013 [facility]), PAF, HTN, HFrEF, pacemaker, GIB/anemia with erosive gastropathy on EGD 3/1/24, diverticulosis and non bleeding external hemorrhoids on Cscope 4/17/24, bilateral PE (2012), former smoker and RLE cellulitis          Last seen in structural clinic on 8/1/24. Per last clinic note:     He was first seen in valve clinic by Dr. Mcconnell a few months ago who felt he was likely best served with Mitraclip. He underwent REZA which confirmed severe MR and deemed acceptable anatomy for NICOLE. LHC showed no significant CAD with acceptable RHC numbers. He presents today to discuss procedure.       Patient reports that he has been having symptoms of dyspnea with exertion upon walking up 4-5 steps.  He also has been having lower extremity edema that are not improving with diuresis.  Denies having chest pain or shortness of breath walking on flat surface.  No dizziness or syncope.     REZA showed severe, wall impinging, anterior MR with MG 2 mmHg. Coronary angiography showed no significant CAD. He was felt best served with NICOLE.           There are no interval changes in history of present illness. There has been no changes to medications, and no interval admissions to the hospital since last clinical  CHANGE Left 2023    Medtronic Dual PPM-GR  Dr. Arnold    PROSTATE BIOPSY  2012    TRANSESOPHAGEAL ECHOCARDIOGRAM  2012    per Dr. Medina...unable to cardiovert d/t clot in atrium    VARICOSE VEIN SURGERY        Family History   Problem Relation Age of Onset    COPD Mother       Social History     Socioeconomic History    Marital status:      Spouse name: Not on file    Number of children: Not on file    Years of education: Not on file    Highest education level: Not on file   Occupational History    Not on file   Tobacco Use    Smoking status: Former     Current packs/day: 0.00     Average packs/day: 0.3 packs/day for 2.0 years (0.6 ttl pk-yrs)     Types: Cigarettes     Start date: 1958     Quit date: 1960     Years since quittin.6    Smokeless tobacco: Never   Vaping Use    Vaping Use: Never used   Substance and Sexual Activity    Alcohol use: Yes     Alcohol/week: 5.8 standard drinks of alcohol     Comment: 3 drinks a week    Drug use: Never    Sexual activity: Not on file   Other Topics Concern    Not on file   Social History Narrative    ** Merged History Encounter **          Social Determinants of Health     Financial Resource Strain: Not on file   Food Insecurity: No Food Insecurity (3/12/2024)    Hunger Vital Sign     Worried About Running Out of Food in the Last Year: Never true     Ran Out of Food in the Last Year: Never true   Transportation Needs: No Transportation Needs (3/12/2024)    PRAPARE - Transportation     Lack of Transportation (Medical): No     Lack of Transportation (Non-Medical): No   Physical Activity: Not on file   Stress: Not on file   Social Connections: Not on file   Intimate Partner Violence: Not on file   Housing Stability: Low Risk  (3/12/2024)    Housing Stability Vital Sign     Unable to Pay for Housing in the Last Year: No     Number of Places Lived in the Last Year: 1     Unstable Housing in the Last Year: No      [facility]   Allergies    Allergen Reactions    Pollen Extract      Other reaction(s): Other: See Comments denied respiratory problems  sneezing               REVIEW OF SYSTEMS:   Review of Systems   Constitutional:  Negative for chills, fatigue and fever.   HENT:  Negative for nosebleeds.    Respiratory:  Positive for shortness of breath. Negative for cough and chest tightness.    Cardiovascular:  Negative for chest pain, palpitations and leg swelling.   Gastrointestinal:  Negative for abdominal pain, blood in stool, nausea and vomiting.   Genitourinary:  Negative for hematuria.   Musculoskeletal:  Negative for back pain and myalgias.   Skin: Negative.    Neurological:  Negative for dizziness, syncope, speech difficulty, weakness and light-headedness.   Hematological:  Does not bruise/bleed easily.   Psychiatric/Behavioral: Negative.                  PHYSICAL EXAMINATION:     VITAL SIGNS: BP (!) 149/79   Pulse 72   Temp 98 °F (36.7 °C) (Temporal)   Resp 16   SpO2 95%      GENERAL: NAD   HEAD: Normocephalic, atraumatic.   NECK: No JVD. No carotid bruits. No neck masses.    CARDIOVASCULAR: Normal rate, regular rhythm, normal S1, S2, holosystolic murmur best heard over left fourth intercostal Space  LUNGS: Clear to auscultation bilaterally, no wheezing.    ABDOMEN: Abdomen is soft and not distended. No tenderness.    EXTREMITIES: There is 2+ pedal edema.   MUSCULOSKELETAL: No joint swelling. Normal range of motion    SKIN: Skin is moist. No ulcers or lesions.   NEUROLOGICAL: No evidence of obvious neurological deficits.    PSYCHOLOGICAL: Patient is in normal mood.            PERTINENT RADIOGRAPHIC/DIAGNOSTICS:     TTE 2/28/24 ([facility]):   CONCLUSIONS:   - Exam indication: Evaluation of known heart failure to guide therapy   - The left ventricle is normal in size. Left ventricular systolic function is   mildly decreased. EF = 50 ± 5% (2D biplane) Left ventricular diastolic function   was not evaluated due to >2+ MR.   - Right ventricular  12/16/2014 07:00 AM      No components found for: \"LDLCALC\"           FRAILTY ASSESSMENT:   New York Heart Association (NYHA) Classification: III  Logan Cardiomyopathy Questionnaire (KCCQ): yes             6-minute walk test: had to stop after 3 minutes  Society of Thoracic Surgeons Score (STS) 30-Day Predicted Mortality Risk:    For Mitral Valve Replacement:  8.0%   For Mitral Valve Repair:  8.2%          ASSESSMENT & PLAN:   - Proceed with planned Transcatheter ymow-ho-evit mitral valve repair (NICOLE) using Mitraclip:   - Keep NPO for the procedure   -  Anesthesia Planned: General  - Intraprocedural Imaging: Transesophageal Echocardiogram (notify echo department)   - Intended Access: Femoral vein   - Remainder of orders to be placed post-procedure   - Will resume home medications after the procedure as appropriate.   - Admission to the hospital for observation following procedure.       Jordon Olmstead will be enrolled in approved national registry, STS/TVT Transcatheter Registry, [id number], as mandated by National Coverage Decision for CMS.

## 2024-08-06 ENCOUNTER — ANESTHESIA EVENT (OUTPATIENT)
Dept: OPERATING ROOM | Age: 89
End: 2024-08-06
Payer: MEDICARE

## 2024-08-07 ENCOUNTER — ANESTHESIA (OUTPATIENT)
Dept: OPERATING ROOM | Age: 89
End: 2024-08-07
Payer: MEDICARE

## 2024-08-07 ENCOUNTER — HOSPITAL ENCOUNTER (INPATIENT)
Age: 89
LOS: 1 days | Discharge: HOME | End: 2024-08-08
Attending: INTERNAL MEDICINE | Admitting: INTERNAL MEDICINE
Payer: MEDICARE

## 2024-08-07 ENCOUNTER — APPOINTMENT (OUTPATIENT)
Age: 89
End: 2024-08-07
Attending: INTERNAL MEDICINE
Payer: MEDICARE

## 2024-08-07 DIAGNOSIS — I34.0 MITRAL REGURGITATION: ICD-10-CM

## 2024-08-07 DIAGNOSIS — I34.0 NONRHEUMATIC MITRAL VALVE REGURGITATION: ICD-10-CM

## 2024-08-07 DIAGNOSIS — Z95.818 STATUS POST IMPLANTATION OF MITRAL VALVE LEAFLET CLIP: Primary | ICD-10-CM

## 2024-08-07 DIAGNOSIS — Z98.890 STATUS POST IMPLANTATION OF MITRAL VALVE LEAFLET CLIP: Primary | ICD-10-CM

## 2024-08-07 LAB
ABO/RH: NORMAL
ANION GAP SERPL CALCULATED.3IONS-SCNC: 9 MMOL/L (ref 7–16)
ANTIBODY SCREEN: NEGATIVE
ARM BAND NUMBER: NORMAL
BLOOD BANK DISPENSE STATUS: NORMAL
BLOOD BANK SAMPLE EXPIRATION: NORMAL
BPU ID: NORMAL
BUN SERPL-MCNC: 22 MG/DL (ref 6–23)
CALCIUM SERPL-MCNC: 8.6 MG/DL (ref 8.6–10.2)
CHLORIDE SERPL-SCNC: 103 MMOL/L (ref 98–107)
CHP ED QC CHECK: NORMAL
CO2 SERPL-SCNC: 31 MMOL/L (ref 22–29)
COMPONENT: NORMAL
CREAT SERPL-MCNC: 0.9 MG/DL (ref 0.7–1.2)
CROSSMATCH RESULT: NORMAL
ERYTHROCYTE [DISTWIDTH] IN BLOOD BY AUTOMATED COUNT: 15.9 % (ref 11.5–15)
GFR, ESTIMATED: 83 ML/MIN/1.73M2
GLUCOSE BLD-MCNC: 116 MG/DL (ref 74–99)
GLUCOSE BLD-MCNC: 127 MG/DL
GLUCOSE BLD-MCNC: 127 MG/DL (ref 74–99)
GLUCOSE BLD-MCNC: 203 MG/DL (ref 74–99)
GLUCOSE SERPL-MCNC: 135 MG/DL (ref 74–99)
HCT VFR BLD AUTO: 33.8 % (ref 37–54)
HGB BLD-MCNC: 10 G/DL (ref 12.5–16.5)
INR PPP: 1.3
MAGNESIUM SERPL-MCNC: 2 MG/DL (ref 1.6–2.6)
MCH RBC QN AUTO: 25.1 PG (ref 26–35)
MCHC RBC AUTO-ENTMCNC: 29.6 G/DL (ref 32–34.5)
MCV RBC AUTO: 84.7 FL (ref 80–99.9)
PLATELET # BLD AUTO: 149 K/UL (ref 130–450)
PMV BLD AUTO: 9.1 FL (ref 7–12)
POTASSIUM SERPL-SCNC: 3.3 MMOL/L (ref 3.5–5)
POTASSIUM SERPL-SCNC: 3.6 MMOL/L (ref 3.5–5)
PROTHROMBIN TIME: 14.7 SEC (ref 9.3–12.4)
RBC # BLD AUTO: 3.99 M/UL (ref 3.8–5.8)
SODIUM SERPL-SCNC: 143 MMOL/L (ref 132–146)
TRANSFUSION STATUS: NORMAL
UNIT DIVISION: 0
WBC OTHER # BLD: 3.9 K/UL (ref 4.5–11.5)

## 2024-08-07 PROCEDURE — 2580000003 HC RX 258: Performed by: INTERNAL MEDICINE

## 2024-08-07 PROCEDURE — 2580000003 HC RX 258: Performed by: ANESTHESIOLOGY

## 2024-08-07 PROCEDURE — 86850 RBC ANTIBODY SCREEN: CPT

## 2024-08-07 PROCEDURE — 4A023N7 MEASUREMENT OF CARDIAC SAMPLING AND PRESSURE, LEFT HEART, PERCUTANEOUS APPROACH: ICD-10-PCS | Performed by: THORACIC SURGERY (CARDIOTHORACIC VASCULAR SURGERY)

## 2024-08-07 PROCEDURE — 33418 REPAIR TCAT MITRAL VALVE: CPT | Performed by: INTERNAL MEDICINE

## 2024-08-07 PROCEDURE — 93320 DOPPLER ECHO COMPLETE: CPT

## 2024-08-07 PROCEDURE — 85027 COMPLETE CBC AUTOMATED: CPT

## 2024-08-07 PROCEDURE — 93312 ECHO TRANSESOPHAGEAL: CPT | Performed by: INTERNAL MEDICINE

## 2024-08-07 PROCEDURE — 99223 1ST HOSP IP/OBS HIGH 75: CPT | Performed by: INTERNAL MEDICINE

## 2024-08-07 PROCEDURE — 02UG3JZ SUPPLEMENT MITRAL VALVE WITH SYNTHETIC SUBSTITUTE, PERCUTANEOUS APPROACH: ICD-10-PCS | Performed by: THORACIC SURGERY (CARDIOTHORACIC VASCULAR SURGERY)

## 2024-08-07 PROCEDURE — 80048 BASIC METABOLIC PNL TOTAL CA: CPT

## 2024-08-07 PROCEDURE — C1725 CATH, TRANSLUMIN NON-LASER: HCPCS | Performed by: INTERNAL MEDICINE

## 2024-08-07 PROCEDURE — 2000000000 HC ICU R&B

## 2024-08-07 PROCEDURE — 2580000003 HC RX 258: Performed by: PHYSICIAN ASSISTANT

## 2024-08-07 PROCEDURE — 2580000003 HC RX 258

## 2024-08-07 PROCEDURE — 86901 BLOOD TYPING SEROLOGIC RH(D): CPT

## 2024-08-07 PROCEDURE — 3600000009 HC SURGERY ROBOT BASE: Performed by: INTERNAL MEDICINE

## 2024-08-07 PROCEDURE — S2900 ROBOTIC SURGICAL SYSTEM: HCPCS | Performed by: INTERNAL MEDICINE

## 2024-08-07 PROCEDURE — 2720000010 HC SURG SUPPLY STERILE: Performed by: INTERNAL MEDICINE

## 2024-08-07 PROCEDURE — 6360000002 HC RX W HCPCS

## 2024-08-07 PROCEDURE — C1894 INTRO/SHEATH, NON-LASER: HCPCS | Performed by: INTERNAL MEDICINE

## 2024-08-07 PROCEDURE — 2500000003 HC RX 250 WO HCPCS

## 2024-08-07 PROCEDURE — 93320 DOPPLER ECHO COMPLETE: CPT | Performed by: INTERNAL MEDICINE

## 2024-08-07 PROCEDURE — 82962 GLUCOSE BLOOD TEST: CPT

## 2024-08-07 PROCEDURE — 37799 UNLISTED PX VASCULAR SURGERY: CPT

## 2024-08-07 PROCEDURE — B24BZZ4 ULTRASONOGRAPHY OF HEART WITH AORTA, TRANSESOPHAGEAL: ICD-10-PCS | Performed by: THORACIC SURGERY (CARDIOTHORACIC VASCULAR SURGERY)

## 2024-08-07 PROCEDURE — 33418 REPAIR TCAT MITRAL VALVE: CPT | Performed by: THORACIC SURGERY (CARDIOTHORACIC VASCULAR SURGERY)

## 2024-08-07 PROCEDURE — 3600000019 HC SURGERY ROBOT ADDTL 15MIN: Performed by: INTERNAL MEDICINE

## 2024-08-07 PROCEDURE — 2500000003 HC RX 250 WO HCPCS: Performed by: INTERNAL MEDICINE

## 2024-08-07 PROCEDURE — 6360000002 HC RX W HCPCS: Performed by: PHYSICIAN ASSISTANT

## 2024-08-07 PROCEDURE — 6360000002 HC RX W HCPCS: Performed by: INTERNAL MEDICINE

## 2024-08-07 PROCEDURE — C1769 GUIDE WIRE: HCPCS | Performed by: INTERNAL MEDICINE

## 2024-08-07 PROCEDURE — 83735 ASSAY OF MAGNESIUM: CPT

## 2024-08-07 PROCEDURE — 7100000000 HC PACU RECOVERY - FIRST 15 MIN

## 2024-08-07 PROCEDURE — 84132 ASSAY OF SERUM POTASSIUM: CPT

## 2024-08-07 PROCEDURE — 86900 BLOOD TYPING SEROLOGIC ABO: CPT

## 2024-08-07 PROCEDURE — 7100000001 HC PACU RECOVERY - ADDTL 15 MIN

## 2024-08-07 PROCEDURE — 93005 ELECTROCARDIOGRAM TRACING: CPT | Performed by: PHYSICIAN ASSISTANT

## 2024-08-07 PROCEDURE — C1893 INTRO/SHEATH, FIXED,NON-PEEL: HCPCS | Performed by: INTERNAL MEDICINE

## 2024-08-07 PROCEDURE — 86923 COMPATIBILITY TEST ELECTRIC: CPT

## 2024-08-07 PROCEDURE — 93319 3D ECHO IMG CGEN CAR ANOMAL: CPT | Performed by: INTERNAL MEDICINE

## 2024-08-07 PROCEDURE — C1760 CLOSURE DEV, VASC: HCPCS | Performed by: INTERNAL MEDICINE

## 2024-08-07 PROCEDURE — A4217 STERILE WATER/SALINE, 500 ML: HCPCS | Performed by: INTERNAL MEDICINE

## 2024-08-07 PROCEDURE — 2709999900 HC NON-CHARGEABLE SUPPLY: Performed by: INTERNAL MEDICINE

## 2024-08-07 PROCEDURE — 3700000001 HC ADD 15 MINUTES (ANESTHESIA): Performed by: INTERNAL MEDICINE

## 2024-08-07 PROCEDURE — 85347 COAGULATION TIME ACTIVATED: CPT

## 2024-08-07 PROCEDURE — 3700000000 HC ANESTHESIA ATTENDED CARE: Performed by: INTERNAL MEDICINE

## 2024-08-07 PROCEDURE — 6370000000 HC RX 637 (ALT 250 FOR IP): Performed by: PHYSICIAN ASSISTANT

## 2024-08-07 PROCEDURE — 85610 PROTHROMBIN TIME: CPT

## 2024-08-07 PROCEDURE — 93319 3D ECHO IMG CGEN CAR ANOMAL: CPT

## 2024-08-07 DEVICE — STEERABLE GUIDE CATHETER
Type: IMPLANTABLE DEVICE | Site: MITRAL VALVE | Status: FUNCTIONAL
Brand: MITRACLIP

## 2024-08-07 DEVICE — G4 CLIP DELIVERY SYSTEM
Type: IMPLANTABLE DEVICE | Site: MITRAL VALVE | Status: FUNCTIONAL
Brand: MITRACLIP™

## 2024-08-07 RX ORDER — HYDROMORPHONE HYDROCHLORIDE 1 MG/ML
0.25 INJECTION, SOLUTION INTRAMUSCULAR; INTRAVENOUS; SUBCUTANEOUS EVERY 5 MIN PRN
Status: DISCONTINUED | OUTPATIENT
Start: 2024-08-07 | End: 2024-08-08 | Stop reason: HOSPADM

## 2024-08-07 RX ORDER — ESCITALOPRAM OXALATE 10 MG/1
20 TABLET ORAL DAILY
Status: DISCONTINUED | OUTPATIENT
Start: 2024-08-08 | End: 2024-08-08 | Stop reason: HOSPADM

## 2024-08-07 RX ORDER — SODIUM CHLORIDE 9 MG/ML
INJECTION, SOLUTION INTRAVENOUS PRN
Status: DISCONTINUED | OUTPATIENT
Start: 2024-08-07 | End: 2024-08-07 | Stop reason: HOSPADM

## 2024-08-07 RX ORDER — LIDOCAINE HYDROCHLORIDE 10 MG/ML
INJECTION, SOLUTION EPIDURAL; INFILTRATION; INTRACAUDAL; PERINEURAL PRN
Status: DISCONTINUED | OUTPATIENT
Start: 2024-08-07 | End: 2024-08-07 | Stop reason: ALTCHOICE

## 2024-08-07 RX ORDER — INSULIN LISPRO 100 [IU]/ML
0-6 INJECTION, SOLUTION INTRAVENOUS; SUBCUTANEOUS NIGHTLY
Status: DISCONTINUED | OUTPATIENT
Start: 2024-08-07 | End: 2024-08-08

## 2024-08-07 RX ORDER — MUPIROCIN 20 MG/G
OINTMENT TOPICAL ONCE
Status: COMPLETED | OUTPATIENT
Start: 2024-08-07 | End: 2024-08-07

## 2024-08-07 RX ORDER — SODIUM CHLORIDE 0.9 % (FLUSH) 0.9 %
5-40 SYRINGE (ML) INJECTION PRN
Status: DISCONTINUED | OUTPATIENT
Start: 2024-08-07 | End: 2024-08-08 | Stop reason: HOSPADM

## 2024-08-07 RX ORDER — FENTANYL CITRATE 50 UG/ML
INJECTION, SOLUTION INTRAMUSCULAR; INTRAVENOUS PRN
Status: DISCONTINUED | OUTPATIENT
Start: 2024-08-07 | End: 2024-08-07 | Stop reason: SDUPTHER

## 2024-08-07 RX ORDER — ONDANSETRON 4 MG/1
4 TABLET, ORALLY DISINTEGRATING ORAL EVERY 8 HOURS PRN
Status: DISCONTINUED | OUTPATIENT
Start: 2024-08-07 | End: 2024-08-08 | Stop reason: HOSPADM

## 2024-08-07 RX ORDER — DIPHENHYDRAMINE HYDROCHLORIDE 50 MG/ML
12.5 INJECTION INTRAMUSCULAR; INTRAVENOUS
Status: ACTIVE | OUTPATIENT
Start: 2024-08-07 | End: 2024-08-08

## 2024-08-07 RX ORDER — CHLORHEXIDINE GLUCONATE ORAL RINSE 1.2 MG/ML
15 SOLUTION DENTAL 2 TIMES DAILY
Status: DISCONTINUED | OUTPATIENT
Start: 2024-08-07 | End: 2024-08-08 | Stop reason: HOSPADM

## 2024-08-07 RX ORDER — HYDRALAZINE HYDROCHLORIDE 20 MG/ML
5 INJECTION INTRAMUSCULAR; INTRAVENOUS
Status: DISCONTINUED | OUTPATIENT
Start: 2024-08-07 | End: 2024-08-08 | Stop reason: HOSPADM

## 2024-08-07 RX ORDER — IPRATROPIUM BROMIDE AND ALBUTEROL SULFATE 2.5; .5 MG/3ML; MG/3ML
1 SOLUTION RESPIRATORY (INHALATION)
Status: ACTIVE | OUTPATIENT
Start: 2024-08-07 | End: 2024-08-08

## 2024-08-07 RX ORDER — LEVOTHYROXINE SODIUM 25 UG/1
25 TABLET ORAL NIGHTLY
Status: DISCONTINUED | OUTPATIENT
Start: 2024-08-07 | End: 2024-08-08 | Stop reason: HOSPADM

## 2024-08-07 RX ORDER — DEXTROSE MONOHYDRATE 100 MG/ML
INJECTION, SOLUTION INTRAVENOUS CONTINUOUS PRN
Status: DISCONTINUED | OUTPATIENT
Start: 2024-08-07 | End: 2024-08-08

## 2024-08-07 RX ORDER — PROPOFOL 10 MG/ML
INJECTION, EMULSION INTRAVENOUS PRN
Status: DISCONTINUED | OUTPATIENT
Start: 2024-08-07 | End: 2024-08-07 | Stop reason: SDUPTHER

## 2024-08-07 RX ORDER — SODIUM CHLORIDE 9 MG/ML
INJECTION, SOLUTION INTRAVENOUS PRN
Status: DISCONTINUED | OUTPATIENT
Start: 2024-08-07 | End: 2024-08-08 | Stop reason: HOSPADM

## 2024-08-07 RX ORDER — LIDOCAINE HYDROCHLORIDE 20 MG/ML
INJECTION, SOLUTION INTRAVENOUS PRN
Status: DISCONTINUED | OUTPATIENT
Start: 2024-08-07 | End: 2024-08-07 | Stop reason: SDUPTHER

## 2024-08-07 RX ORDER — SODIUM CHLORIDE 0.9 % (FLUSH) 0.9 %
5-40 SYRINGE (ML) INJECTION EVERY 12 HOURS SCHEDULED
Status: DISCONTINUED | OUTPATIENT
Start: 2024-08-07 | End: 2024-08-08 | Stop reason: HOSPADM

## 2024-08-07 RX ORDER — LABETALOL HYDROCHLORIDE 5 MG/ML
5 INJECTION, SOLUTION INTRAVENOUS
Status: DISCONTINUED | OUTPATIENT
Start: 2024-08-07 | End: 2024-08-08 | Stop reason: HOSPADM

## 2024-08-07 RX ORDER — LANOLIN ALCOHOL/MO/W.PET/CERES
500 CREAM (GRAM) TOPICAL DAILY
Status: DISCONTINUED | OUTPATIENT
Start: 2024-08-07 | End: 2024-08-08 | Stop reason: HOSPADM

## 2024-08-07 RX ORDER — DROPERIDOL 2.5 MG/ML
0.62 INJECTION, SOLUTION INTRAMUSCULAR; INTRAVENOUS
Status: ACTIVE | OUTPATIENT
Start: 2024-08-07 | End: 2024-08-08

## 2024-08-07 RX ORDER — ONDANSETRON 2 MG/ML
4 INJECTION INTRAMUSCULAR; INTRAVENOUS EVERY 6 HOURS PRN
Status: DISCONTINUED | OUTPATIENT
Start: 2024-08-07 | End: 2024-08-08 | Stop reason: HOSPADM

## 2024-08-07 RX ORDER — SODIUM CHLORIDE, SODIUM LACTATE, POTASSIUM CHLORIDE, CALCIUM CHLORIDE 600; 310; 30; 20 MG/100ML; MG/100ML; MG/100ML; MG/100ML
INJECTION, SOLUTION INTRAVENOUS CONTINUOUS PRN
Status: DISCONTINUED | OUTPATIENT
Start: 2024-08-07 | End: 2024-08-07 | Stop reason: SDUPTHER

## 2024-08-07 RX ORDER — ONDANSETRON 2 MG/ML
4 INJECTION INTRAMUSCULAR; INTRAVENOUS
Status: ACTIVE | OUTPATIENT
Start: 2024-08-07 | End: 2024-08-08

## 2024-08-07 RX ORDER — PROTAMINE SULFATE 10 MG/ML
INJECTION, SOLUTION INTRAVENOUS PRN
Status: DISCONTINUED | OUTPATIENT
Start: 2024-08-07 | End: 2024-08-07 | Stop reason: SDUPTHER

## 2024-08-07 RX ORDER — HYDROMORPHONE HYDROCHLORIDE 1 MG/ML
0.5 INJECTION, SOLUTION INTRAMUSCULAR; INTRAVENOUS; SUBCUTANEOUS EVERY 5 MIN PRN
Status: DISCONTINUED | OUTPATIENT
Start: 2024-08-07 | End: 2024-08-08 | Stop reason: HOSPADM

## 2024-08-07 RX ORDER — INSULIN LISPRO 100 [IU]/ML
0-12 INJECTION, SOLUTION INTRAVENOUS; SUBCUTANEOUS
Status: DISCONTINUED | OUTPATIENT
Start: 2024-08-07 | End: 2024-08-08

## 2024-08-07 RX ORDER — SODIUM CHLORIDE 9 MG/ML
INJECTION, SOLUTION INTRAVENOUS PRN
Status: DISCONTINUED | OUTPATIENT
Start: 2024-08-07 | End: 2024-08-07

## 2024-08-07 RX ORDER — POTASSIUM CHLORIDE 1500 MG/1
40 TABLET, EXTENDED RELEASE ORAL ONCE
Status: COMPLETED | OUTPATIENT
Start: 2024-08-07 | End: 2024-08-07

## 2024-08-07 RX ORDER — FUROSEMIDE 40 MG
40 TABLET ORAL DAILY
Status: DISCONTINUED | OUTPATIENT
Start: 2024-08-07 | End: 2024-08-08 | Stop reason: HOSPADM

## 2024-08-07 RX ORDER — PHENYLEPHRINE HCL IN 0.9% NACL 1 MG/10 ML
SYRINGE (ML) INTRAVENOUS PRN
Status: DISCONTINUED | OUTPATIENT
Start: 2024-08-07 | End: 2024-08-07 | Stop reason: SDUPTHER

## 2024-08-07 RX ORDER — PANTOPRAZOLE SODIUM 40 MG/1
40 TABLET, DELAYED RELEASE ORAL DAILY
Status: DISCONTINUED | OUTPATIENT
Start: 2024-08-08 | End: 2024-08-08 | Stop reason: HOSPADM

## 2024-08-07 RX ORDER — DEXAMETHASONE SODIUM PHOSPHATE 10 MG/ML
INJECTION INTRAMUSCULAR; INTRAVENOUS PRN
Status: DISCONTINUED | OUTPATIENT
Start: 2024-08-07 | End: 2024-08-07 | Stop reason: SDUPTHER

## 2024-08-07 RX ORDER — SODIUM CHLORIDE 0.9 % (FLUSH) 0.9 %
5-40 SYRINGE (ML) INJECTION EVERY 12 HOURS SCHEDULED
Status: DISCONTINUED | OUTPATIENT
Start: 2024-08-07 | End: 2024-08-07 | Stop reason: HOSPADM

## 2024-08-07 RX ORDER — HEPARIN SODIUM 1000 [USP'U]/ML
INJECTION, SOLUTION INTRAVENOUS; SUBCUTANEOUS PRN
Status: DISCONTINUED | OUTPATIENT
Start: 2024-08-07 | End: 2024-08-07 | Stop reason: SDUPTHER

## 2024-08-07 RX ORDER — ROCURONIUM BROMIDE 10 MG/ML
INJECTION, SOLUTION INTRAVENOUS PRN
Status: DISCONTINUED | OUTPATIENT
Start: 2024-08-07 | End: 2024-08-07 | Stop reason: SDUPTHER

## 2024-08-07 RX ORDER — POTASSIUM CHLORIDE 1500 MG/1
20 TABLET, EXTENDED RELEASE ORAL DAILY
Status: DISCONTINUED | OUTPATIENT
Start: 2024-08-08 | End: 2024-08-08 | Stop reason: HOSPADM

## 2024-08-07 RX ORDER — PROPRANOLOL HCL 60 MG
60 CAPSULE, EXTENDED RELEASE 24HR ORAL DAILY
Status: DISCONTINUED | OUTPATIENT
Start: 2024-08-08 | End: 2024-08-08 | Stop reason: HOSPADM

## 2024-08-07 RX ORDER — ATORVASTATIN CALCIUM 20 MG/1
20 TABLET, FILM COATED ORAL NIGHTLY
Status: DISCONTINUED | OUTPATIENT
Start: 2024-08-07 | End: 2024-08-08 | Stop reason: HOSPADM

## 2024-08-07 RX ORDER — GLUCAGON 1 MG/ML
1 KIT INJECTION PRN
Status: DISCONTINUED | OUTPATIENT
Start: 2024-08-07 | End: 2024-08-08

## 2024-08-07 RX ORDER — SODIUM CHLORIDE 0.9 % (FLUSH) 0.9 %
5-40 SYRINGE (ML) INJECTION PRN
Status: DISCONTINUED | OUTPATIENT
Start: 2024-08-07 | End: 2024-08-07 | Stop reason: HOSPADM

## 2024-08-07 RX ORDER — DOCUSATE SODIUM 100 MG/1
100 CAPSULE, LIQUID FILLED ORAL 2 TIMES DAILY
Status: DISCONTINUED | OUTPATIENT
Start: 2024-08-07 | End: 2024-08-08 | Stop reason: HOSPADM

## 2024-08-07 RX ORDER — SODIUM CHLORIDE 9 MG/ML
INJECTION, SOLUTION INTRAVENOUS CONTINUOUS
Status: DISCONTINUED | OUTPATIENT
Start: 2024-08-07 | End: 2024-08-07 | Stop reason: HOSPADM

## 2024-08-07 RX ORDER — MIDAZOLAM HYDROCHLORIDE 2 MG/2ML
2 INJECTION, SOLUTION INTRAMUSCULAR; INTRAVENOUS
Status: ACTIVE | OUTPATIENT
Start: 2024-08-07 | End: 2024-08-08

## 2024-08-07 RX ORDER — GABAPENTIN 400 MG/1
400 CAPSULE ORAL 4 TIMES DAILY
Status: DISCONTINUED | OUTPATIENT
Start: 2024-08-07 | End: 2024-08-08 | Stop reason: HOSPADM

## 2024-08-07 RX ORDER — ASPIRIN 325 MG
325 TABLET ORAL ONCE
Status: COMPLETED | OUTPATIENT
Start: 2024-08-07 | End: 2024-08-07

## 2024-08-07 RX ORDER — ACETAMINOPHEN 325 MG/1
650 TABLET ORAL
Status: ACTIVE | OUTPATIENT
Start: 2024-08-07 | End: 2024-08-08

## 2024-08-07 RX ORDER — NALOXONE HYDROCHLORIDE 0.4 MG/ML
INJECTION, SOLUTION INTRAMUSCULAR; INTRAVENOUS; SUBCUTANEOUS PRN
Status: DISCONTINUED | OUTPATIENT
Start: 2024-08-07 | End: 2024-08-08 | Stop reason: HOSPADM

## 2024-08-07 RX ORDER — ONDANSETRON 2 MG/ML
INJECTION INTRAMUSCULAR; INTRAVENOUS PRN
Status: DISCONTINUED | OUTPATIENT
Start: 2024-08-07 | End: 2024-08-07 | Stop reason: SDUPTHER

## 2024-08-07 RX ADMIN — ATORVASTATIN CALCIUM 20 MG: 20 TABLET, FILM COATED ORAL at 21:28

## 2024-08-07 RX ADMIN — ROCURONIUM BROMIDE 10 MG: 10 INJECTION, SOLUTION INTRAVENOUS at 13:07

## 2024-08-07 RX ADMIN — FUROSEMIDE 40 MG: 40 TABLET ORAL at 18:24

## 2024-08-07 RX ADMIN — SUGAMMADEX 200 MG: 100 INJECTION, SOLUTION INTRAVENOUS at 13:36

## 2024-08-07 RX ADMIN — FENTANYL CITRATE 100 MCG: 50 INJECTION, SOLUTION INTRAMUSCULAR; INTRAVENOUS at 11:17

## 2024-08-07 RX ADMIN — POTASSIUM CHLORIDE 40 MEQ: 1500 TABLET, EXTENDED RELEASE ORAL at 18:30

## 2024-08-07 RX ADMIN — Medication 100 MCG: at 13:07

## 2024-08-07 RX ADMIN — INSULIN LISPRO 2 UNITS: 100 INJECTION, SOLUTION INTRAVENOUS; SUBCUTANEOUS at 21:35

## 2024-08-07 RX ADMIN — LIDOCAINE HYDROCHLORIDE 100 MG: 20 INJECTION, SOLUTION INTRAVENOUS at 11:17

## 2024-08-07 RX ADMIN — SODIUM CHLORIDE: 9 INJECTION, SOLUTION INTRAVENOUS at 07:49

## 2024-08-07 RX ADMIN — HEPARIN SODIUM 2000 UNITS: 1000 INJECTION INTRAVENOUS; SUBCUTANEOUS at 12:41

## 2024-08-07 RX ADMIN — Medication 100 MCG: at 11:27

## 2024-08-07 RX ADMIN — GABAPENTIN 400 MG: 400 CAPSULE ORAL at 21:25

## 2024-08-07 RX ADMIN — DEXAMETHASONE SODIUM PHOSPHATE 10 MG: 10 INJECTION INTRAMUSCULAR; INTRAVENOUS at 11:17

## 2024-08-07 RX ADMIN — PROPOFOL 150 MG: 10 INJECTION, EMULSION INTRAVENOUS at 11:17

## 2024-08-07 RX ADMIN — ROCURONIUM BROMIDE 50 MG: 10 INJECTION, SOLUTION INTRAVENOUS at 11:17

## 2024-08-07 RX ADMIN — CHLORHEXIDINE GLUCONATE, 0.12% ORAL RINSE 15 ML: 1.2 SOLUTION DENTAL at 21:28

## 2024-08-07 RX ADMIN — LEVOTHYROXINE SODIUM 25 MCG: 0.03 TABLET ORAL at 21:25

## 2024-08-07 RX ADMIN — MUPIROCIN: 20 OINTMENT TOPICAL at 07:51

## 2024-08-07 RX ADMIN — WATER 2000 MG: 1 INJECTION INTRAMUSCULAR; INTRAVENOUS; SUBCUTANEOUS at 18:25

## 2024-08-07 RX ADMIN — Medication 100 MCG: at 12:13

## 2024-08-07 RX ADMIN — Medication 100 MCG: at 12:54

## 2024-08-07 RX ADMIN — Medication 100 MCG: at 12:28

## 2024-08-07 RX ADMIN — Medication 100 MCG: at 13:27

## 2024-08-07 RX ADMIN — ROCURONIUM BROMIDE 20 MG: 10 INJECTION, SOLUTION INTRAVENOUS at 12:07

## 2024-08-07 RX ADMIN — ONDANSETRON 4 MG: 2 INJECTION INTRAMUSCULAR; INTRAVENOUS at 13:33

## 2024-08-07 RX ADMIN — SODIUM CHLORIDE, PRESERVATIVE FREE 10 ML: 5 INJECTION INTRAVENOUS at 21:28

## 2024-08-07 RX ADMIN — Medication 500 MG: at 18:25

## 2024-08-07 RX ADMIN — Medication 100 MCG: at 11:46

## 2024-08-07 RX ADMIN — DOCUSATE SODIUM 100 MG: 100 CAPSULE, LIQUID FILLED ORAL at 18:24

## 2024-08-07 RX ADMIN — ASPIRIN 325 MG: 325 TABLET ORAL at 07:50

## 2024-08-07 RX ADMIN — SODIUM CHLORIDE, SODIUM LACTATE, POTASSIUM CHLORIDE, CALCIUM CHLORIDE: 600; 310; 30; 20 INJECTION, SOLUTION INTRAVENOUS at 10:52

## 2024-08-07 RX ADMIN — Medication 200 MCG: at 11:38

## 2024-08-07 RX ADMIN — PROTAMINE SULFATE 100 MG: 10 INJECTION, SOLUTION INTRAVENOUS at 13:25

## 2024-08-07 RX ADMIN — CHLORHEXIDINE GLUCONATE, 0.12% ORAL RINSE 15 ML: 1.2 SOLUTION DENTAL at 18:25

## 2024-08-07 RX ADMIN — CEFAZOLIN 2000 MG: 2 INJECTION, POWDER, FOR SOLUTION INTRAMUSCULAR; INTRAVENOUS at 11:22

## 2024-08-07 RX ADMIN — SODIUM CHLORIDE, POTASSIUM CHLORIDE, SODIUM LACTATE AND CALCIUM CHLORIDE: 600; 310; 30; 20 INJECTION, SOLUTION INTRAVENOUS at 11:03

## 2024-08-07 RX ADMIN — SUGAMMADEX 200 MG: 100 INJECTION, SOLUTION INTRAVENOUS at 13:46

## 2024-08-07 RX ADMIN — Medication 100 MCG: at 12:42

## 2024-08-07 RX ADMIN — GABAPENTIN 400 MG: 400 CAPSULE ORAL at 18:24

## 2024-08-07 RX ADMIN — SODIUM CHLORIDE, PRESERVATIVE FREE 10 ML: 5 INJECTION INTRAVENOUS at 21:29

## 2024-08-07 ASSESSMENT — ENCOUNTER SYMPTOMS
VOMITING: 0
BACK PAIN: 0
SHORTNESS OF BREATH: 1
BLOOD IN STOOL: 0
CHEST TIGHTNESS: 0
ABDOMINAL PAIN: 0
COUGH: 0
NAUSEA: 0

## 2024-08-07 ASSESSMENT — LIFESTYLE VARIABLES: SMOKING_STATUS: 0

## 2024-08-07 NOTE — PROGRESS NOTES
Patient admitted to the CVIC bed 3817 post op. Patient vital signs stable. Pt on 6 liters simple mask. Lethargic, oriented x4.

## 2024-08-07 NOTE — PROGRESS NOTES
Patient admitted to PACU with the following belongings:  Cell Phone, Pants, Shirt, Socks, Shoes, Jacket, Hat, and Other toiletries, toiletry bag, license. Undergarmentsx2 . The following belongings admitted with the patient, None, were sent home with the patient's family.

## 2024-08-07 NOTE — PLAN OF CARE
Problem: Safety - Adult  Goal: Free from fall injury  Outcome: Progressing     Problem: ABCDS Injury Assessment  Goal: Absence of physical injury  Outcome: Progressing     Problem: Neurosensory - Adult  Goal: Achieves stable or improved neurological status  Outcome: Progressing     Problem: Cardiovascular - Adult  Goal: Maintains optimal cardiac output and hemodynamic stability  Outcome: Progressing  Goal: Absence of cardiac dysrhythmias or at baseline  Outcome: Progressing     Problem: Respiratory - Adult  Goal: Achieves optimal ventilation and oxygenation  Outcome: Progressing     Problem: Gastrointestinal - Adult  Goal: Minimal or absence of nausea and vomiting  Outcome: Progressing     Problem: Genitourinary - Adult  Goal: Absence of urinary retention  Outcome: Progressing     Problem: Metabolic/Fluid and Electrolytes - Adult  Goal: Electrolytes maintained within normal limits  Outcome: Progressing     Problem: Skin/Tissue Integrity - Adult  Goal: Incisions, wounds, or drain sites healing without S/S of infection  Outcome: Progressing

## 2024-08-07 NOTE — ANESTHESIA PROCEDURE NOTES
Arterial Line:    An arterial line was placed using ultrasound guidance and surface landmarks, in the pre-op for the following indication(s): continuous blood pressure monitoring and blood sampling needed.    A 20 gauge (size), 1 and 3/4 inch (length), Arrow (type) catheter was placed, Seldinger technique used, into the left radial artery, secured by tape.  Anesthesia type: Local  Local infiltration: Injection    Events:  patient tolerated procedure well with no complications.    Additional notes:  Well tolerated.  Anesthesiologist: Kimberlyn Winston MD  Performed: Anesthesiologist   Preanesthetic Checklist  Completed: patient identified, IV checked, site marked, risks and benefits discussed, surgical/procedural consents, equipment checked, pre-op evaluation, timeout performed, anesthesia consent given, oxygen available, monitors applied/VS acknowledged, fire risk safety assessment completed and verbalized and blood product R/B/A discussed and consented

## 2024-08-07 NOTE — ANESTHESIA PRE PROCEDURE
Department of Anesthesiology  Preprocedure Note       Name:  Jordon Olmstead   Age:  89 y.o.  :  1935                                          MRN:  [id number]         Date:  2024      Surgeon: Surgeon(s):  Juanito Ruiz MD Henn, Lucas W, MD    Procedure: Procedure(s):  TRANSCATHETER MITRAL VALVE REPAIR  TRANSCATHETER MITRAL VALVE REPAIR    Medications prior to admission:   Prior to Admission medications    Medication Sig Start Date End Date Taking? Authorizing Provider   chlorhexidine (PERIDEX) 0.12 % solution Swish and spit 15 mLs 2 times daily 24   Twin Darby MD   gabapentin (NEURONTIN) 400 MG capsule Take 1 capsule by mouth 4 times daily. 24   Twin Darby MD   furosemide (LASIX) 40 MG tablet Take 1 tablet by mouth daily 24   Twin Darby MD   propranolol (INDERAL LA) 60 MG extended release capsule Take 1 capsule by mouth daily    Twin Darby MD   magnesium Oxide 500 MG TABS Take 500 mg by mouth daily    Twin Darby MD   potassium chloride (KLOR-CON M) 20 MEQ extended release tablet Take 1 tablet by mouth daily    Twin Darby MD   apixaban (ELIQUIS) 5 MG TABS tablet Take 1 tablet by mouth 2 times daily    Twin Darby MD   empagliflozin (JARDIANCE) 10 MG tablet Take 1 tablet by mouth daily    Twin Darby MD   docusate sodium (COLACE) 100 MG capsule Take 1 capsule by mouth 2 times daily    Twin Darby MD   atorvastatin (LIPITOR) 20 MG tablet Take 1 tablet by mouth at bedtime    Twin Darby MD   escitalopram (LEXAPRO) 20 MG tablet Take 1 tablet by mouth daily    Twin Darby MD   pantoprazole (PROTONIX) 40 MG tablet Take 1 tablet by mouth daily    Twin Darby MD   levothyroxine (SYNTHROID) 25 MCG tablet Take 1 tablet by mouth nightly 5/10/16   Twin Darby MD       Current medications:    Current Facility-Administered Medications   Medication Dose Route Frequency

## 2024-08-07 NOTE — DISCHARGE INSTRUCTIONS
We wish you a speedy recovery. Here is some important information about caring for yourself when you go home.      Traveling Home    For your safety, a responsible adult must drive you home the day of discharge.?      How Will I Feel?   You may feel discomfort at the incision site during the first week after the procedure. The incision site has been closed with a closure device and you may feel a pea size lump at the site however this will lessen over time. The doctor will tell you what medications you can take for pain relief.?   Please tell your doctor or nurse if your symptoms are prolonged or severe.      Restrictions   No driving for 3 days   Lifting, push, pull restrictions max of 10? lbs. for 1 week   Do not have any dental cleanings (or dental work) for 8 weeks after procedure. You will need to take an antibiotic one hour before any dental cleaning for life.   How do I care for the wound site?   You may shower after 24 hours   Keep the incision area clean and dry (Do not scrub the area.)?   You need use a Band-Aid over the incision if you wish to, however, change it every day   Do not use creams, lotions, or ointments on the incision site?   Look at the area daily to make sure it is healing properly   If you notice any of the signs of infection please call your doctor   When to Call:   Please call us right away if you have any of these signs of infection:   Increased drainage, bleeding, or oozing from the incision site (If this develops please apply direct pressure to site.)   Increased opening of the incision   Redness, swelling, or warmth around the incision site   Increased body temperature (greater than 101° F or 38.4° C)   Fever, shaking or chills, and/or dizziness   Heart palpitations (sensation of fast or hard beating from your heart)   If you gain more than 2 lbs. overnight or 4 pounds in a week   Also call your provider if you have:   Chest pain, shortness of breath, weight gain, lower extremity  swelling or any other concerns   Follow Up   9/3/2024 - 10:00 am echocardiogram; 11:00 am follow up with Riri  If you have any questions please call our valve clinic coordinator(Riri Mendoza PA-C) at [phone]   If you need to reschedule, please call [phone]   If You Have Questions   Please call the Valve Clinic Coordinator ([phone]), Monday through Friday, from 8 a.m. to 4 p.m. We prefer that you call during these hours, if at all possible.   After hours, please call [phone], and ask for the on-call Interventional Cardiologist to be paged. ?         Cardiac Rehabilitation: Discharge instructions        Cardiac rehabilitation is a program for people who have a heart problem, such as a heart attack, coronary stent placed, heart failure, or a heart valve disease. The program includes exercise, lifestyle changes, education, and emotional support. Cardiac rehab can help you improve the quality of your life through better overall health. It can help you lose weight and feel better about yourself.    On your cardiac rehab team, you may have your doctor, a nurse specialist, an exercise physiologist, and a dietitian. They will design your cardiac rehab program specifically for you. You will learn how to reduce your risk for heart problems, how to manage stress, and how to eat a heart-healthy diet. By the end of the program, you will be ready to maintain a healthier lifestyle on your own.    Follow-up care is a key part of your treatment and safety. Be sure to make and go to all appointments, and call your doctor if you are having problems. It's also a good idea to know your test results and keep a list of the medicines you take.    Please call to schedule your first appointment once you have been cleared by your cardiologist to attend Phase II Outpatient Cardiac Rehabilitation.       Cardiac Rehabilitation Options:  [facility] Cardiac Rehab             [facility]  [facility]  [address]                                                                                          Cardiology Services  [place], [place] [address]                                                                              [address]  P- [phone]                                                                                         [place] [address]  Hours: M/W/F 7am-7pm & T/Th 7am-12pm                                                  [phone]                                                                                                                          [phone]  [facility]  Cardiac Rehab  [address], [place]                                                                                                 [facility]  P- [phone]                                                                                         Cardiac Rehabilitation  Hours: M/W/F 7am-6pm                                                                                [address] [place] [address]  [facility]                                                          [phone]  Cardiac Rehabilitation                                                                                   [phone]  [address] [place] [address]                                                                                        [facility]  [phone]                                                                                          Cardiac Rehabilitation  [phone]

## 2024-08-07 NOTE — OP NOTE
Operative Note      Patient: Jordon Olmstead  YOB: 1935  MRN: [id number]    Date of Procedure: 8/7/2024    Pre-Op Diagnosis Codes:     * Mitral regurgitation [I34.0]    Post-Op Diagnosis: Same     Anesthesia: General    Specimens:   * No specimens in log *    Implants:  Implant Name Type Inv. Item Serial No.  Lot No. LRB No. Used Action   SYSTEM VLV REP MITRACLIP G4 BUNDLE USE W/ [id number] STEERABLE GUIDE CATH - [id number] Mitral valves SYSTEM VLV REP MITRACLIP G4 BUNDLE USE W/ [id number] STEERABLE GUIDE CATH  [employer] [id number] N/A 1 Implanted   SYSTEM VLV DEL XT MITRACLIP - [id number] Mitral valves SYSTEM VLV DEL XT MITRACLIP  [employer] [id number] N/A 1 Implanted         Drains:   Urinary Catheter 08/07/24 Peter-Temperature (Active)   Catheter Indications Need for fluid volume management of the critically ill patient in a critical care setting 08/07/24 1600   Site Assessment No urethral drainage 08/07/24 1600   Urine Color Yellow 08/07/24 1600   Urine Appearance Clear 08/07/24 1600   Urine Odor Malodorous 08/07/24 1600   Collection Container Standard 08/07/24 1600   Securement Method Securing device (Describe) 08/07/24 1600   Catheter Best Practices  Drainage tube clipped to bed;Catheter secured to thigh;Lack of dependent loop in tubing;Drainage bag less than half full;Tamper seal intact;Bag below bladder;Bag not on floor 08/07/24 1600   Status Draining 08/07/24 1600   Output (mL) 40 mL 08/07/24 1600       Findings:  Infection Present At Time Of Surgery (PATOS) (choose all levels that have infection present):  No infection present  Other Findings: none    Detailed Description of Procedure:   Reason for MitraClip: Prohibitive risk for open MVR due to age and frailty      Procedure date: 12/20/2023      Procedure: [id number] percutaneous mitral valve repair      Serial Number:    Clip #1 LOT [id number] REF [id number]   Steerable guide LOT [id number] REF [id number]       Primary IC: Juanito Ruiz MD  CT surgeon: Raad Mcconnell MD     Anesthesia: Dr. Winston   Echo: Dr. Jones      Procedures Performed:   1. RFV access (24F)   2. Transeptal puncture   3. MitraClip XT implant   4. REZA   5. [facility]      Procedure:    Dr. Winston brought patient into the room and sedated (general anesthesia)/intubated and placed radial a-line.    Baseline REZA demonstrated Severe MR with flail segment of P3 and anteriorly directed eccentric jet      Patient prepped and draped in usual sterile fashion. Right femoral venous access obtained and 6F sheath inserted. I then performed \"Pre-Close\" of the vein with two perclose devices. The 8F [place] [employer] transeptal sheath delivered to the SVC over standard J-wire. Under REZA guidance we slowly pulled the sheath back to a superior/posterior position and measured 4-5 cm above the mitral valve. After positioning the sheath, the needle was charged and we ablated across the septum as confirmed by LA pressure and sats. The sheath was advanced without difficulty and LA access confirmed with fluoro and hemodynamics.      The baseline LA pressure mean was 25 mmHg. 45 mmHg v wave.     Serial increment of weight based Heparin were administered and ACT kept >250 sec throughout the case.      Next, we flushed the G4 MitraClip steerable guide catheter and clip delivery system. We then advanced the G4 MitraClip steerable guiding sheath across the septum into the LA. Next, we advanced the XT Clip Delivery System to the LA and using M knob to flex the Clip perpendicular to the MV. Using 3D and 2D guidance we then advanced the clip across the MV along A3P3.      We released the clip with mean gradient of 3 mmHg.          The final LA pressure: Mean LA pressure 20 mmHg with 33 mmHg V wave.      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      We removed the sheath and completed the Pre-close with excellent hemostasis.       Final REZA notable for reduction of MR from 4+ to trace and mean  MV gradient 3 mmHg. Significant improvement from baseline.      EBL: 25 cc    Fluoro time: 21.8 min   DAP: 336, 85218.62 uGy/m2   Drains: None    Specimens: n/a    Total Contrast: 0 mL Visipaque    Complications: None         REZA scan confirmed stable device position and no signs of pericardial effusion.       No immediate complications.     Electronically signed by Raad Mcconnell MD on 8/7/2024 at 5:00 PM

## 2024-08-08 ENCOUNTER — APPOINTMENT (OUTPATIENT)
Age: 89
End: 2024-08-08
Attending: INTERNAL MEDICINE
Payer: MEDICARE

## 2024-08-08 VITALS
HEIGHT: 72 IN | SYSTOLIC BLOOD PRESSURE: 107 MMHG | WEIGHT: 242.6 LBS | OXYGEN SATURATION: 95 % | HEART RATE: 75 BPM | DIASTOLIC BLOOD PRESSURE: 55 MMHG | TEMPERATURE: 97.3 F | BODY MASS INDEX: 32.86 KG/M2 | RESPIRATION RATE: 18 BRPM

## 2024-08-08 LAB
ACTIVATED CLOTTING TIME, LOW RANGE: 163 SEC
ACTIVATED CLOTTING TIME, LOW RANGE: 259 SEC
ACTIVATED CLOTTING TIME, LOW RANGE: 265 SEC
ACTIVATED CLOTTING TIME, LOW RANGE: 307 SEC
ACTIVATED CLOTTING TIME, LOW RANGE: 339 SEC
ACTIVATED CLOTTING TIME, LOW RANGE: 379 SEC
ECHO BSA: 2.41 M2
ECHO EST RA PRESSURE: 3 MMHG
ECHO LV FRACTIONAL SHORTENING: 30 % (ref 28–44)
ECHO LV INTERNAL DIMENSION DIASTOLE INDEX: 2.34 CM/M2
ECHO LV INTERNAL DIMENSION DIASTOLIC: 5.4 CM (ref 4.2–5.9)
ECHO LV INTERNAL DIMENSION SYSTOLIC INDEX: 1.65 CM/M2
ECHO LV INTERNAL DIMENSION SYSTOLIC: 3.8 CM
ECHO LV IVSD: 1.2 CM (ref 0.6–1)
ECHO LV IVSS: 1.6 CM
ECHO LV MASS 2D: 264.4 G (ref 88–224)
ECHO LV MASS INDEX 2D: 114.5 G/M2 (ref 49–115)
ECHO LV POSTERIOR WALL DIASTOLIC: 1.2 CM (ref 0.6–1)
ECHO LV POSTERIOR WALL SYSTOLIC: 1.2 CM
ECHO LV RELATIVE WALL THICKNESS RATIO: 0.44
ECHO LVOT MEAN GRADIENT: 1 MMHG
ECHO LVOT PEAK GRADIENT: 1 MMHG
ECHO LVOT PEAK VELOCITY: 0.6 M/S
ECHO LVOT VTI: 13.4 CM
ECHO MV AREA PHT: 3.2 CM2
ECHO MV LVOT VTI INDEX: 2.88
ECHO MV MAX VELOCITY: 1.5 M/S
ECHO MV MAX VELOCITY: 1.6 M/S
ECHO MV MEAN GRADIENT: 3 MMHG
ECHO MV MEAN GRADIENT: 4 MMHG
ECHO MV MEAN VELOCITY: 0.8 M/S
ECHO MV MEAN VELOCITY: 0.9 M/S
ECHO MV PEAK GRADIENT: 10 MMHG
ECHO MV PEAK GRADIENT: 10 MMHG
ECHO MV PRESSURE HALF TIME (PHT): 69.5 MS
ECHO MV REGURGITANT VELOCITY PISA: 4.8 M/S
ECHO MV REGURGITANT VTIA: 189.9 CM
ECHO MV VTI: 38.6 CM
ECHO MV VTI: 40.6 CM
ECHO PVEIN PEAK D VELOCITY: 0.6 M/S
ECHO PVEIN PEAK S VELOCITY: 0.4 M/S
ECHO PVEIN S/D RATIO: 0.7
ECHO RIGHT VENTRICULAR SYSTOLIC PRESSURE (RVSP): 34 MMHG
ECHO RV INTERNAL DIMENSION: 3.5 CM
ECHO TV REGURGITANT MAX VELOCITY: 2.79 M/S
ECHO TV REGURGITANT PEAK GRADIENT: 31 MMHG
EKG ATRIAL RATE: 68 BPM
EKG ATRIAL RATE: 70 BPM
EKG P AXIS: 14 DEGREES
EKG P-R INTERVAL: 394 MS
EKG P-R INTERVAL: 418 MS
EKG Q-T INTERVAL: 460 MS
EKG Q-T INTERVAL: 462 MS
EKG QRS DURATION: 110 MS
EKG QRS DURATION: 114 MS
EKG QTC CALCULATION (BAZETT): 491 MS
EKG QTC CALCULATION (BAZETT): 496 MS
EKG R AXIS: 25 DEGREES
EKG R AXIS: 40 DEGREES
EKG T AXIS: 15 DEGREES
EKG T AXIS: 25 DEGREES
EKG VENTRICULAR RATE: 68 BPM
EKG VENTRICULAR RATE: 70 BPM
POTASSIUM SERPL-SCNC: 3.5 MMOL/L (ref 3.5–5)

## 2024-08-08 PROCEDURE — 93321 DOPPLER ECHO F-UP/LMTD STD: CPT

## 2024-08-08 PROCEDURE — 6370000000 HC RX 637 (ALT 250 FOR IP): Performed by: PHYSICIAN ASSISTANT

## 2024-08-08 PROCEDURE — 93010 ELECTROCARDIOGRAM REPORT: CPT | Performed by: INTERNAL MEDICINE

## 2024-08-08 PROCEDURE — 6360000002 HC RX W HCPCS: Performed by: PHYSICIAN ASSISTANT

## 2024-08-08 PROCEDURE — 2700000000 HC OXYGEN THERAPY PER DAY

## 2024-08-08 PROCEDURE — 84132 ASSAY OF SERUM POTASSIUM: CPT

## 2024-08-08 PROCEDURE — 93325 DOPPLER ECHO COLOR FLOW MAPG: CPT | Performed by: INTERNAL MEDICINE

## 2024-08-08 PROCEDURE — 93308 TTE F-UP OR LMTD: CPT | Performed by: INTERNAL MEDICINE

## 2024-08-08 PROCEDURE — 93005 ELECTROCARDIOGRAM TRACING: CPT | Performed by: PHYSICIAN ASSISTANT

## 2024-08-08 PROCEDURE — 2580000003 HC RX 258: Performed by: PHYSICIAN ASSISTANT

## 2024-08-08 PROCEDURE — 93321 DOPPLER ECHO F-UP/LMTD STD: CPT | Performed by: INTERNAL MEDICINE

## 2024-08-08 PROCEDURE — 99239 HOSP IP/OBS DSCHRG MGMT >30: CPT | Performed by: INTERNAL MEDICINE

## 2024-08-08 RX ADMIN — FUROSEMIDE 40 MG: 40 TABLET ORAL at 08:30

## 2024-08-08 RX ADMIN — ESCITALOPRAM OXALATE 20 MG: 10 TABLET ORAL at 08:26

## 2024-08-08 RX ADMIN — Medication 500 MG: at 08:26

## 2024-08-08 RX ADMIN — POTASSIUM CHLORIDE 20 MEQ: 1500 TABLET, EXTENDED RELEASE ORAL at 08:30

## 2024-08-08 RX ADMIN — CHLORHEXIDINE GLUCONATE, 0.12% ORAL RINSE 15 ML: 1.2 SOLUTION DENTAL at 08:27

## 2024-08-08 RX ADMIN — EMPAGLIFLOZIN 10 MG: 10 TABLET, FILM COATED ORAL at 08:29

## 2024-08-08 RX ADMIN — DOCUSATE SODIUM 100 MG: 100 CAPSULE, LIQUID FILLED ORAL at 08:26

## 2024-08-08 RX ADMIN — PANTOPRAZOLE SODIUM 40 MG: 40 TABLET, DELAYED RELEASE ORAL at 08:26

## 2024-08-08 RX ADMIN — WATER 2000 MG: 1 INJECTION INTRAMUSCULAR; INTRAVENOUS; SUBCUTANEOUS at 15:35

## 2024-08-08 RX ADMIN — GABAPENTIN 400 MG: 400 CAPSULE ORAL at 08:27

## 2024-08-08 RX ADMIN — WATER 2000 MG: 1 INJECTION INTRAMUSCULAR; INTRAVENOUS; SUBCUTANEOUS at 04:46

## 2024-08-08 RX ADMIN — PROPRANOLOL HYDROCHLORIDE 60 MG: 60 CAPSULE, EXTENDED RELEASE ORAL at 08:31

## 2024-08-08 RX ADMIN — GABAPENTIN 400 MG: 400 CAPSULE ORAL at 15:35

## 2024-08-08 RX ADMIN — APIXABAN 5 MG: 5 TABLET, FILM COATED ORAL at 08:28

## 2024-08-08 RX ADMIN — SODIUM CHLORIDE, PRESERVATIVE FREE 10 ML: 5 INJECTION INTRAVENOUS at 08:27

## 2024-08-08 NOTE — ANESTHESIA POSTPROCEDURE EVALUATION
Department of Anesthesiology  Postprocedure Note    Patient: Jordon Olmstead  MRN: [id number]  YOB: 1935  Date of evaluation: 8/8/2024    Procedure Summary       Date: 08/07/24 Room / Location: [address]    Anesthesia Start: 1107 Anesthesia Stop: 1402    Procedures:       TRANSCATHETER MITRAL VALVE REPAIR      Transcatheter mitral valve repair      TRANSCATHETER MITRAL VALVE REPAIR Diagnosis:       Mitral regurgitation      (Mitral regurgitation)    Surgeons: Juanito Ruiz MD; Raad Mcconnell MD Responsible Provider: Kimberlyn Winston MD    Anesthesia Type: General ASA Status: 4            Anesthesia Type: General    Molly Phase I: Molly Score: 8    Molly Phase II:      Anesthesia Post Evaluation    Patient location during evaluation: PACU  Patient participation: complete - patient participated  Level of consciousness: awake and alert  Airway patency: patent  Nausea & Vomiting: no nausea and no vomiting  Cardiovascular status: blood pressure returned to baseline and hemodynamically stable  Respiratory status: acceptable and spontaneous ventilation  Hydration status: euvolemic  Multimodal analgesia pain management approach  Pain management: adequate    There were no known notable events for this encounter.

## 2024-08-08 NOTE — CARE COORDINATION
Chart reviewed for transition of care at discharge. Admitted with Mitral valve regurgitation. S/P Transcatheter mitral valve repair. Met with patient who stated that he lives with his wife in a ranch home. He stated that he is independent and uses no assistive devices. He has a shower chair and walker at home if needed. He has no history of SNF or HHC. He lives in [place], and travels to [facility] for his physicians. His PCP is Dr Abadie and his pharmacy is [facility] in [place]. His discharge plan is home when medically stable with no needs voiced. His daughter and wife will transport home. CM/SW will follow.  Electronically signed by Brady Guaman RN on 8/8/2024 at 2:54 PM    Case Management Assessment  Initial Evaluation    Date/Time of Evaluation: 8/8/2024 2:55 PM  Assessment Completed by: Brady Guaman RN    If patient is discharged prior to next notation, then this note serves as note for discharge by case management.    Patient Name: Jordon Olmstead                   YOB: 1935  Diagnosis: Mitral regurgitation [I34.0]  Severe mitral regurgitation by prior echocardiogram [I34.0]                   Date / Time: 8/7/2024  6:55 AM    Patient Admission Status: Inpatient   Readmission Risk (Low < 19, Mod (19-27), High > 27): Readmission Risk Score: 11.3    Current PCP: Abadie, Katarina, MD  PCP verified by CM? Yes    Chart Reviewed: Yes      History Provided by: Patient  Patient Orientation: Alert and Oriented    Patient Cognition: Alert    Hospitalization in the last 30 days (Readmission):  No    If yes, Readmission Assessment in  Navigator will be completed.    Advance Directives:      Code Status: Full Code   Patient's Primary Decision Maker is: Legal Next of Kin    Primary Decision Maker: Raquel Olmstead - Spouse - [phone]    Discharge Planning:    Patient lives with: Spouse/Significant Other Type of Home: House  Primary Care Giver: Self  Patient Support Systems include: Spouse/Significant

## 2024-08-08 NOTE — PROGRESS NOTES
Patient arterial line removed with no complications  Pressure dressing applied at the site    Patient venous sheath removed with no complications  Pressure dressing applied to right femoral area

## 2024-08-08 NOTE — ACP (ADVANCE CARE PLANNING)
Advance Care Planning   The patient has the following advanced directives on file:  Advance Directives       Power of  Living Will ACP-Advance Directive ACP-Power of     Not on File Filed on 07/06/13 Filed Not on File            The patient has appointed the following active healthcare agents:    Primary Decision Maker: Raquel Olmstead - Spouse - [phone]    The Patient has the following current code status:    Code Status: Full Code    Brady Guaman RN  8/8/2024

## 2024-08-08 NOTE — PLAN OF CARE
Problem: Discharge Planning  Goal: Discharge to home or other facility with appropriate resources  Outcome: Progressing  Flowsheets (Taken 8/7/2024 2000)  Discharge to home or other facility with appropriate resources: Identify barriers to discharge with patient and caregiver     Problem: Safety - Adult  Goal: Free from fall injury  8/7/2024 2158 by Adriana Arshad RN  Outcome: Progressing  Flowsheets (Taken 8/7/2024 2000)  Free From Fall Injury: Instruct family/caregiver on patient safety  8/7/2024 0833 by Oscar Calabrese RN  Outcome: Progressing     Problem: ABCDS Injury Assessment  Goal: Absence of physical injury  8/7/2024 2158 by Adriana Arshad RN  Outcome: Progressing  Flowsheets (Taken 8/7/2024 2000)  Absence of Physical Injury: Implement safety measures based on patient assessment  8/7/2024 0833 by Oscar Calabrese RN  Outcome: Progressing     Problem: Neurosensory - Adult  Goal: Achieves stable or improved neurological status  8/7/2024 2158 by Adriana Arshad RN  Outcome: Progressing  Flowsheets (Taken 8/7/2024 2000)  Achieves stable or improved neurological status:   Assess for and report changes in neurological status   Initiate measures to prevent increased intracranial pressure  8/7/2024 0833 by Oscar Calabrese RN  Outcome: Progressing     Problem: Cardiovascular - Adult  Goal: Maintains optimal cardiac output and hemodynamic stability  8/7/2024 2158 by Adriana Arshad RN  Outcome: Progressing  Flowsheets (Taken 8/7/2024 2000)  Maintains optimal cardiac output and hemodynamic stability:   Monitor blood pressure and heart rate   Monitor urine output and notify Licensed Independent Practitioner for values outside of normal range  8/7/2024 0833 by Oscar Calabrese RN  Outcome: Progressing  Goal: Absence of cardiac dysrhythmias or at baseline  8/7/2024 2158 by Adriana Arshad RN  Outcome: Progressing  Flowsheets (Taken 8/7/2024 2000)  Absence of cardiac dysrhythmias or at baseline: Monitor cardiac rate and  imbalances  8/7/2024 0833 by Oscar Calabrese, RN  Outcome: Progressing  Goal: Hemodynamic stability and optimal renal function maintained  Outcome: Progressing  Flowsheets (Taken 8/7/2024 2000)  Hemodynamic stability and optimal renal function maintained: Monitor labs and assess for signs and symptoms of volume excess or deficit     Problem: Skin/Tissue Integrity - Adult  Goal: Skin integrity remains intact  Outcome: Progressing  Flowsheets (Taken 8/7/2024 2000)  Skin Integrity Remains Intact:   Monitor for areas of redness and/or skin breakdown   Assess vascular access sites hourly  Goal: Incisions, wounds, or drain sites healing without S/S of infection  8/7/2024 2158 by Adriana Arshad RN  Outcome: Progressing  Flowsheets (Taken 8/7/2024 2000)  Incisions, Wounds, or Drain Sites Healing Without Sign and Symptoms of Infection:   ADMISSION and DAILY: Assess and document risk factors for pressure ulcer development   TWICE DAILY: Assess and document skin integrity   TWICE DAILY: Assess and document dressing/incision, wound bed, drain sites and surrounding tissue  8/7/2024 0833 by Oscar Calabrese, RN  Outcome: Progressing     Problem: Hematologic - Adult  Goal: Maintains hematologic stability  Outcome: Progressing  Flowsheets (Taken 8/7/2024 2000)  Maintains hematologic stability: Assess for signs and symptoms of bleeding or hemorrhage     Problem: Musculoskeletal - Adult  Goal: Return mobility to safest level of function  Outcome: Progressing  Flowsheets (Taken 8/7/2024 2000)  Return Mobility to Safest Level of Function:   Assess patient stability and activity tolerance for standing, transferring and ambulating with or without assistive devices   Assist with transfers and ambulation using safe patient handling equipment as needed   Ensure adequate protection for wounds/incisions during mobilization  Goal: Maintain proper alignment of affected body part  Outcome: Progressing  Flowsheets (Taken 8/7/2024 2000)  Maintain  proper alignment of affected body part: Support and protect limb and body alignment per provider's orders

## 2024-08-08 NOTE — PROGRESS NOTES
4 Eyes Skin Assessment     NAME:  Jordon Olmstead  YOB: 1935  MEDICAL RECORD NUMBER:  [id number]    The patient is being assessed for  Transfer to New Unit    I agree that at least one RN has performed a thorough Head to Toe Skin Assessment on the patient. ALL assessment sites listed below have been assessed.      Areas assessed by both nurses:    Head, Face, Ears, Shoulders, Back, Chest, Arms, Elbows, Hands, Sacrum. Buttock, Coccyx, Ischium, and Legs. Feet and Heels        Does the Patient have a Wound? No noted wound(s)       Vincent Prevention initiated by RN: No  Wound Care Orders initiated by RN: No    Pressure Injury (Stage 3,4, Unstageable, DTI, NWPT, and Complex wounds) if present, place Wound referral order by RN under : No    New Ostomies, if present place, Ostomy referral order under : No     Nurse 1 eSignature: Electronically signed by Susan Amaya RN on 8/8/24 at 4:41 AM EDT    **SHARE this note so that the co-signing nurse can place an eSignature**    Nurse 2 eSignature: Electronically signed by Elvira Foster RN on 8/8/24 at 5:53 AM EDT

## 2024-08-08 NOTE — CONSULTS
Met with patient and discussed that their physician has ordered a referral to our outpatient Phase II Cardiac Rehabilitation program. Reviewed the benefits of cardiac rehabilitation based on their diagnosis and personal risk factors. Patient demonstrates strong interest in Cardiac Rehabilitation at this time.  Cardiac Rehabilitation brochure provided to patient/family. The Cardiac Rehabilitation Program has been provided the patient's referral information and pertinent patient details and history. The patient may call [facility] Cardiac Rehabilitation at [phone] for additional information or questions. Contact information for [facility] Cardiac Rehabilitation and other choices close to the patient's residence have been provided in the discharge instructions so that the patient may call and schedule an appointment when cleared by their physician. Thank you for the referral.

## 2024-08-08 NOTE — PROGRESS NOTES
Patient dunn catheter removed with no difficulty  Patient education on post-dunn voiding  Patient provided urinal  Patient able to verbalize understanding  Patient due to void at [phone]

## 2024-08-09 ENCOUNTER — CARE COORDINATION (OUTPATIENT)
Dept: CARE COORDINATION | Age: 89
End: 2024-08-09

## 2024-08-09 NOTE — DISCHARGE SUMMARY
[facility]    DISCHARGE SUMMARY    DEMOGRAPHICS Jordon Olmstead / 1935 (89 y.o.) / MRN [id number]   ADMIT / DC DATE 8/7/2024 - 8/9/2024 (LOS : 1)   ATTENDING MD No att. providers found   PRIMARY CARDIOLOGY Dr. Garcia    PRIMARY CARE Abadie, Katarina, MD      REASON FOR ADMIT Mitral regurgitation s/p MitraClip     DISPOSITION Home, lives with family   PLANNED READMIT no         PRIMARY DISCHARGE DIAGNOSIS   1. Severe MR s/p MitraClip       SECONDARY DISCHARGE DIAGNOSIS    Need for lifelong endocarditis prophylaxis             ** Implanted  is MRI compatible **     ** Prophylactic antibiotics required before dental work and avoid cleanings for 8 weeks post-procedure **         CONSULTANTS : none         SUMMARY OF PROCEDURES/STUDIES (see individual procedure notes for more details)    1.  Transcatheter mitral valve repair with NICOLE (MitraClip) 8/7/2024  2.  Limited TTE 8/8/2024        FOLLOW UP AND PENDING PROCEDURES/STUDIES     1.  Repeat transthoracic echo in 1 month with follow-up with structural clinic      FOLLOW UP APPOINTMENTS   Future Appointments   Date Time Provider Department Center   8/23/2024  2:40 PM Justin Garcia MD [facility]   9/3/2024 10:00 AM ALEC YDILLON ECHO 1 [facility]   9/3/2024 11:00 AM Riri Mendoza PA [place]           DISCHARGE MEDICATIONS   Discharge Medication List as of 8/8/2024  5:31 PM        CONTINUE these medications which have NOT CHANGED    Details   chlorhexidine (PERIDEX) 0.12 % solution Swish and spit 15 mLs 2 times dailyHistorical Med      gabapentin (NEURONTIN) 400 MG capsule Take 1 capsule by mouth 4 times daily.Historical Med      furosemide (LASIX) 40 MG tablet Take 1 tablet by mouth dailyHistorical Med      propranolol (INDERAL LA) 60 MG extended release capsule Take 1 capsule by mouth dailyHistorical Med      magnesium Oxide 500 MG TABS Take 500 mg by mouth dailyHistorical Med      potassium  and hemostasis was confirmed, the patient was discharged from the hospital in stable condition. Appropriate follow up appointments arranged, medication changes explained and all questions answered to the best of our abilities with the information available from this admission.               DISCHARGE EXAM     Vital Signs: BP (!) 107/55   Pulse 75   Temp 97.3 °F (36.3 °C) (Temporal)   Resp 18   Ht 1.829 m (6')   Wt 110 kg (242 lb 9.6 oz)   SpO2 95%   BMI 32.90 kg/m²     GEN : Well developed, well nourished, alert and cooperative, NAD   NECK : no evidence of JVD   CHEST WALL : No evidence of pectus deformities   CV: =regular rate and rhythm, no M,R,G  VASC : Access site without bleeding, hematoma and good pulses noted, no bruit over site   LUNGS : Lungs clear to auscultation bilaterally   ABD : Soft and non-tender    : deferred at this time    SKIN : Skin normal color, texture and turgor with no lesions or eruptions       DISCHARGE LABS   Lab Results   Component Value Date/Time     08/07/2024 02:01 PM    K 3.5 08/08/2024 01:07 AM     08/07/2024 02:01 PM    CO2 31 08/07/2024 02:01 PM    BUN 22 08/07/2024 02:01 PM    CREATININE 0.9 08/07/2024 02:01 PM      Lab Results   Component Value Date/Time    WBC 3.9 08/07/2024 02:01 PM    HGB 10.0 08/07/2024 02:01 PM     08/07/2024 02:01 PM      Lab Results   Component Value Date/Time    INR 1.3 08/07/2024 02:01 PM        ______________________________________________________________________________________     AFTER HOSPITAL CARE PLAN for Jordon Olmstead:     For any questions or problems please call the Cardiology / Interventional Fellow or Attending Physician listed in the above section of this discharge summary. Cardiology services has on-call personnel available 24-7 to answer any and all questions that you may have regarding patient care. Patients or physicians can reach a provider directly through the hospital       OTHER COUNSELING  PROVIDED THIS ADMISSION   - The patient was advised to return to ED or call a physician if :    --> Sustained fevers > 101 unresponsive to Tylenol / Ibuprofen (if tolerated)    --> Worsening abdominal pain / distention    --> Intractable nausea, vomiting or diarrhea    --> Inability to tolerate adequate oral intake of food    --> Neurologic changes, chest pain or shortness of breath   --> Chest pain or worsening shortness of breath         Juanito Ruiz MD   8/9/2024 4:19 PM

## 2024-08-09 NOTE — CARE COORDINATION
Care Transitions Note    Initial Call - Call within 2 business days of discharge: Yes    Patient Current Location:  Home: [address] [place] [id number]    Care Transition Nurse contacted the patient by telephone to perform post hospital discharge assessment, verified name and  as identifiers. Provided introduction to self, and explanation of the Care Transition Nurse role.     Patient: Jordon Olmstead    Patient : 1935   MRN: [id number]    Reason for Admission: Status post implantation of mitral valve leaflet clip   Discharge Date: 24  RURS: Readmission Risk Score: 11.3      Last Discharge Facility       Date Complaint Diagnosis Description Type Department Provider    24  Status post implantation of mitral valve leaflet clip ... Admission (Discharged) [id number] Juanito Ruiz MD     Care Summary Note:     Spoke with Jordon for [facility] care transition call post hospital discharge. He reports that he is feeling \"fantastic\". He denies any concerns with his right femoral site. He denies questions about his medications or post op instructions.   Jordon denies any needs, questions, or concerns at this time.   He stated he was just about to leave to go show an apartment and ended the call.   Will sign off as Jordon follows with [facility].       Care Transition Nurse reviewed discharge instructions, medical action plan, and red flags with patient. The patient was given an opportunity to ask questions; no further questions or concerns at this time.. The patient demonstrated understanding using teach back method.   Were discharge instructions available to patient? Yes.   Reviewed appropriate site of care based on symptoms and resources available to patient including: PCP  Specialist. The patient agrees to contact the primary care provider and/or specialist office for questions related to their healthcare.      Assessments:  Care Transitions 24 Hour Call    Do you have a copy of your

## 2024-08-11 LAB
ABO/RH: NORMAL
ANTIBODY SCREEN: NEGATIVE
ARM BAND NUMBER: NORMAL
BLOOD BANK DISPENSE STATUS: NORMAL
BLOOD BANK SAMPLE EXPIRATION: NORMAL
BPU ID: NORMAL
COMPONENT: NORMAL
CROSSMATCH RESULT: NORMAL
TRANSFUSION STATUS: NORMAL
UNIT DIVISION: 0

## 2024-08-23 ENCOUNTER — OFFICE VISIT (OUTPATIENT)
Dept: CARDIOLOGY CLINIC | Age: 89
End: 2024-08-23
Payer: MEDICARE

## 2024-08-23 VITALS
WEIGHT: 240.2 LBS | DIASTOLIC BLOOD PRESSURE: 62 MMHG | SYSTOLIC BLOOD PRESSURE: 124 MMHG | RESPIRATION RATE: 18 BRPM | HEART RATE: 70 BPM | HEIGHT: 72 IN | BODY MASS INDEX: 32.53 KG/M2

## 2024-08-23 DIAGNOSIS — I34.0 MITRAL VALVE INSUFFICIENCY, UNSPECIFIED ETIOLOGY: Primary | ICD-10-CM

## 2024-08-23 PROCEDURE — 93000 ELECTROCARDIOGRAM COMPLETE: CPT | Performed by: INTERNAL MEDICINE

## 2024-08-23 PROCEDURE — 99214 OFFICE O/P EST MOD 30 MIN: CPT | Performed by: INTERNAL MEDICINE

## 2024-08-23 PROCEDURE — 1123F ACP DISCUSS/DSCN MKR DOCD: CPT | Performed by: INTERNAL MEDICINE

## 2024-08-23 NOTE — PROGRESS NOTES
OUTPATIENT CARDIOLOGY FOLLOW-UP    Name: Jordon Olmstead    Age: 89 y.o.    Primary Care Physician: Abadie, Katarina, MD    Date of Service: 8/23/2024    Chief Complaint:   Chief Complaint   Patient presents with    Cardiac Valve Problem     3 month office visit       Interim History:   Mr. Olmstead is a 89-year-old gentleman with history of severe aortic stenosis underwent bioprosthetic AVR on 12/10/2013, also had a maze and left atrial clipping, hypertension and hypothyroidism on hormonal replacement therapy. He had a symptomatic sick sinus syndrome with a history of tachybradycardia syndrome and received [employer]onic dual-chamber pacemaker, history of proximal atrial fibrillation with rapid ventricular response and a bilateral pulmonary emboli in July 2012, history of L1 fracture secondary to fall status post kyphoplasty is here for follow-up visit.     He was last seen in the office was in to 9/2.22, since then he was seen in the cardiology office by Moy REYNOSO on 3/26/2024 following recent admission [facility] on 2/26/2024 for CHF and was found to be severely anemic.  He did receive blood transfusion.  He was seen by. Since his last visit, he was hospitalized in April 2024 with cellulitis of the right lower extremity.  He was initiated on Keflex.  He was also noted to have a anemia secondary to GI bleed and was evaluated at [facility] underwent an EGD and colonoscopy and EGD was normal colonoscopy showed diverticulosis and nonbleeding external hemorrhoids.  He was also diagnosed with a left superficial thrombophlebitis involving left upper extremity.  He was also hospitalized in March 2024 with GI bleed hemoglobin was 7.6.  He denies any more bloody stools or black stools.  He still has some swelling of the legs and taking Lasix every day.  He had an echocardiogram at [facility] on 2/28/2024 which showed an EF of 50±5% severely dilated left atrium moderate to severe 3+ mitral regurgitation secondary to prolapse of the 
fraction is visually estimated at 60%.   Mild left ventricular concentric hypertrophy noted.   No wall motion abnormalities   The left atrium is severely dilated.   Severely enlarged right atrium.   Mild mitral regurgitation is present.   Well seated bio prosthetic aortic valve # 29   Pacer wire visualized in right ventricle.    TTE-5/15/2021:  Normal left ventricle size and systolic function.   Ejection fraction is visually estimated at 60-65%.   No regional wall motion abnormalities seen.   Mild concentric left ventricular hypertrophy.   Stage III diastolic dysfunction.   The left atrium is severely dilated.   Mildly dilated right ventricle.   Right ventricle global systolic function is normal . TAPSE 21 mm.   Moderate-to-severe mitral regurgitation with anteriorly directed jet.   Normally functioning bioprosthetic valve in aortic position.   Aortic chaitanya peak velocity 2 m/s, mean gradient 9 mmHg. DVI 0.35. ACT 90   ms.   No evidence of aortic valve regurgitation.   Mild to moderate tricuspid regurgitation.   RVSP is 37 mmHg. Normal estimated PA systolic pressure.   No evidence for hemodynamically significant pericardial effusion.   Consider REZA for further evaluation of mitral regurgitation.         REZA-5/6/2021    Normal left ventricle size and systolic function.   Ejection fraction is visually estimated at 60-65%.   No regional wall motion abnormalities seen.   Normal left ventricle wall thickness.   Mildly dilated left atrium.   Normal right ventricular size and function.   Moderate mitral regurgitation with anteriorly directed jet. VC 0.6 cm, ERO   0.6 cm2, no PV flow reversal.   Normally functioning bioprosthetic valve in aortic position.   No hemodynamically significant aortic stenosis is present. TAYLOR by direct   planimetry is 2.5 cm2.   Mild tricuspid regurgitation.   RVSP is 26 mmHg. Normal estimated PA systolic pressure.   No evidence for hemodynamically significant pericardial effusion.    Limited TTE

## 2024-08-23 NOTE — PATIENT INSTRUCTIONS
Recent hospital records were reviewed, as above underwent successful [id number] repair of the mitral valve and posterior MitraClip he had only mild MR.  Continue Eliquis 5 mg p.o. twice daily for anticoagulation  Continue atorvastatin 20 mg p.o. daily  Continue dapagliflozin 10 mg p.o. daily.  Follow up with pcp for INR monitoring.   Follow-up with EP service for pacemaker evaluation.  Continue Protonix 40 mg p.o. daily  Continue furosemide 40 mg p.o. daily and potassium 20 mg p.o. daily.  Rest as per primary service   Follow-up with me in 6 months

## 2024-08-25 NOTE — PROGRESS NOTES
Physician Progress Note      PATIENT:               EMMETT LILLY  CSN #:                  [id number]  :                       1935  ADMIT DATE:       2024 6:55 AM  DISCH DATE:        2024 6:41 PM  RESPONDING  PROVIDER #:        Juanito Ruiz MD          QUERY TEXT:    Patient admitted with Mitral regurgitation. Noted to have Atrial fibrillation   and is maintained on Eliquis. If possible, please document in progress notes   and discharge summary if you are evaluating and/or treating any of the   following:?    ?The medical record reflects the following:  Risk Factors: Age, HTN, CHF  Clinical Indicators: H&P-PMHx of Atrial fibrillation  Treatment: Eliquis        Thank you,  Jaclyn Mckeon, [facility] CDS  Options provided:  -- Secondary hypercoagulable state in a patient with atrial fibrillation  -- Other - I will add my own diagnosis  -- Disagree - Not applicable / Not valid  -- Disagree - Clinically unable to determine / Unknown  -- Refer to Clinical Documentation Reviewer    PROVIDER RESPONSE TEXT:    This patient has secondary hypercoagulable state in a patient with atrial   fibrillation.    Query created by: Jaclyn Mckeon on 2024 5:59 AM      Electronically signed by:  Juanito Ruiz MD 2024 1:36 PM

## 2024-08-30 ENCOUNTER — TELEPHONE (OUTPATIENT)
Dept: CARDIOLOGY | Age: 89
End: 2024-08-30

## 2024-08-30 RX ORDER — AMOXICILLIN 500 MG/1
CAPSULE ORAL
Qty: 4 CAPSULE | Refills: 1 | Status: SHIPPED | OUTPATIENT
Start: 2024-08-30

## 2024-09-02 DIAGNOSIS — Z95.818 STATUS POST IMPLANTATION OF MITRAL VALVE LEAFLET CLIP: Primary | ICD-10-CM

## 2024-09-02 DIAGNOSIS — Z98.890 STATUS POST IMPLANTATION OF MITRAL VALVE LEAFLET CLIP: Primary | ICD-10-CM

## 2024-09-03 ENCOUNTER — HOSPITAL ENCOUNTER (OUTPATIENT)
Dept: CARDIOLOGY | Age: 89
Discharge: HOME | End: 2024-09-05
Payer: MEDICARE

## 2024-09-03 ENCOUNTER — OFFICE VISIT (OUTPATIENT)
Dept: CARDIOLOGY CLINIC | Age: 89
End: 2024-09-03

## 2024-09-03 VITALS
WEIGHT: 244 LBS | RESPIRATION RATE: 16 BRPM | HEART RATE: 80 BPM | SYSTOLIC BLOOD PRESSURE: 160 MMHG | DIASTOLIC BLOOD PRESSURE: 80 MMHG | BODY MASS INDEX: 33.05 KG/M2 | HEIGHT: 72 IN

## 2024-09-03 DIAGNOSIS — Z98.890 STATUS POST IMPLANTATION OF MITRAL VALVE LEAFLET CLIP: ICD-10-CM

## 2024-09-03 DIAGNOSIS — Z98.890 STATUS POST IMPLANTATION OF MITRAL VALVE LEAFLET CLIP: Primary | ICD-10-CM

## 2024-09-03 DIAGNOSIS — Z95.818 STATUS POST IMPLANTATION OF MITRAL VALVE LEAFLET CLIP: Primary | ICD-10-CM

## 2024-09-03 DIAGNOSIS — Z95.818 STATUS POST IMPLANTATION OF MITRAL VALVE LEAFLET CLIP: ICD-10-CM

## 2024-09-03 PROCEDURE — 93306 TTE W/DOPPLER COMPLETE: CPT

## 2024-09-03 ASSESSMENT — KANSAS CITY CARDIOMYOPATHY QUESTIONNAIRE (KCCQ12)
1C. OVER THE PAST 2 WEEKS, HOW MUCH WERE YOU LIMITED BY HEART FAILURE SYMPTOMS (SHORTNESS OF BREATH OR FATIGUE) WHEN HURRYING OR JOGGING (AS IF TO CATCH A BUS): LIMITED FOR OTHER REASONS OR DID NOT DO THE ACTIVITY
6. OVER THE PAST 2 WEEKS, HOW MUCH HAS YOUR HEART FAILURE LIMITED YOUR ENJOYMENT OF LIFE: IT HAS MODERATELY LIMITED MY ENJOYMENT OF LIFE
7. IF YOU HAD TO SPEND THE REST OF YOUR LIFE WITH YOUR HEART FAILURE THE WAY IT IS RIGHT NOW, HOW WOULD YOU FEEL ABOUT THIS?: COMPLETELY SATISFIED
8C. OVER THE PAST 2 WEEKS, ON AVERAGE, HOW HAS HEART FAILURE LIMITED YOU ABILITY TO VISIT FAMILY AND FRIENDS OUR OF YOUR HOME: DID NOT LIMIT AT ALL
8B. OVER THE PAST 2 WEEKS, ON AVERAGE, HOW HAS HEART FAILURE LIMITED YOU ABILITY TO WORK OR DO HOUSEHOLD CHORES: DID NOT LIMIT AT ALL
5. OVER THE PAST 2 WEEKS, ON AVERAGE, HOW MANY TIMES HAVE YOU BEEN FORCED TO SLEEP SITTING UP IN A CHAIR OR WITH AT LEAST 3 PILLOWS TO PROP YOU UP BECAUSE OF SHORTNESS OF BREATH?: NEVER OVER THE PAST 2 WEEKS
4. OVER THE PAST 2 WEEKS, ON AVERAGE, HOW MANY TIMES HAS SHORTNESS OF BREATH LIMITED YOUR ABILITY TO DO WHAT YOU WANTED: AT LEAST ONCE A DAY
1A. OVER THE PAST 2 WEEKS, HOW MUCH WERE YOU LIMITED BY HEART FAILURE SYMPTOMS (SHORTNESS OF BREATH OR FATIGUE) WHEN SHOWERING OR BATHING: NOT AT ALL LIMITED
8A. OVER THE PAST 2 WEEKS, ON AVERAGE, HOW HAS HEART FAILURE LIMITED YOU ABILITY TO DO HOBBIES OR RECREATIONAL ACTIVITIES: DID NOT LIMIT AT ALL
1B. OVER THE PAST 2 WEEKS, HOW MUCH WERE YOU LIMITED BY HEART FAILURE SYMPTOMS (SHORTNESS OF BREATH OR FATIGUE) WHEN WALKING 1 BLOCK ON LEVEL GROUND: LIMITED FOR OTHER REASONS OR DID NOT DO THE ACTIVITY
2. OVER THE PAST 2 WEEKS, HOW MANY TIMES DID YOU HAVE SWELLING IN YOUR FEET, ANKLES OR LEGS WHEN YOU WOKE UP IN THE MORNING: EVERY MORNING
3. OVER THE PAST 2 WEEKS, ON AVERAGE, HOW MANY TIMES HAS FATIGUE LIMITED YOUR ABILITY TO DO WHAT YOU WANTED: NEVER OVER THE PAST 2 WEEKS

## 2024-09-03 NOTE — PROGRESS NOTES
[facility]  Visit Note      Patient name: Jordon Olmstead    Reason for visit: Mitraclip follow up    Referring Physician: Dr. Garcia    Primary Care Physician: Abadie, Katarina, MD    Date of service: 9/3/2024    Chief Complaint: mitraclip follow up - 30 day    HPI: Mr. Olmstead presents for follow up s/p NICOLE using Mitraclip on 8/7/24. He is doing well since the procedure. He notes improvement in his breathing with activity. He denies chest pain, sob at rest, orthopnea, PND, palpitations or near syncope.      Allergies:   Allergies   Allergen Reactions    Pollen Extract      Other reaction(s): Other: See Comments denied respiratory problems  sneezing       Home medications:    Current Outpatient Medications   Medication Sig Dispense Refill    amoxicillin (AMOXIL) 500 MG capsule Take 4 capsules (2000 mg) by mouth 60 minutes prior to dental appointments 4 capsule 1    chlorhexidine (PERIDEX) 0.12 % solution Swish and spit 15 mLs 2 times daily      gabapentin (NEURONTIN) 400 MG capsule Take 1 capsule by mouth 4 times daily.      furosemide (LASIX) 40 MG tablet Take 1 tablet by mouth daily      propranolol (INDERAL LA) 60 MG extended release capsule Take 1 capsule by mouth daily      magnesium Oxide 500 MG TABS Take 500 mg by mouth daily      potassium chloride (KLOR-CON M) 20 MEQ extended release tablet Take 1 tablet by mouth daily      apixaban (ELIQUIS) 5 MG TABS tablet Take 1 tablet by mouth 2 times daily      empagliflozin (JARDIANCE) 10 MG tablet Take 1 tablet by mouth daily      docusate sodium (COLACE) 100 MG capsule Take 1 capsule by mouth 2 times daily      atorvastatin (LIPITOR) 20 MG tablet Take 1 tablet by mouth at bedtime      escitalopram (LEXAPRO) 20 MG tablet Take 1 tablet by mouth daily      pantoprazole (PROTONIX) 40 MG tablet Take 1 tablet by mouth daily      levothyroxine (SYNTHROID) 25 MCG tablet Take 1 tablet by mouth nightly       No current facility-administered medications

## 2024-09-03 NOTE — PATIENT INSTRUCTIONS
Follow up on 3/4/2025 at 10:00 am for echocardiogram; call sooner if concerns arise in the meantime    Follow up with Drs. Abadie and Jose as scheduled    Reminder: antibiotic required prior to dental appointments

## 2024-09-03 NOTE — TELEPHONE ENCOUNTER
Patient called regarding prescription for antibiotic prior to dental appointment. Amoxicillin prescribed to pharmacy. Patient aware.

## 2024-09-04 LAB
ECHO AO ASC DIAM: 3.8 CM
ECHO AO ASCENDING AORTA INDEX: 1.64 CM/M2
ECHO AO SINUS VALSALVA DIAM: 3.6 CM
ECHO AO SINUS VALSALVA INDEX: 1.55 CM/M2
ECHO AV ACCELERATION TIME: 94.58 MS
ECHO AV AREA PEAK VELOCITY: 2.4 CM2
ECHO AV AREA VTI: 2.2 CM2
ECHO AV AREA/BSA PEAK VELOCITY: 1 CM2/M2
ECHO AV AREA/BSA VTI: 0.9 CM2/M2
ECHO AV CUSP MM: 1.8 CM
ECHO AV MEAN GRADIENT: 6 MMHG
ECHO AV MEAN VELOCITY: 1.2 M/S
ECHO AV PEAK GRADIENT: 13 MMHG
ECHO AV PEAK VELOCITY: 1.8 M/S
ECHO AV VELOCITY RATIO: 0.56
ECHO AV VTI: 50 CM
ECHO BSA: 2.37 M2
ECHO EST RA PRESSURE: 8 MMHG
ECHO LA DIAMETER INDEX: 2.46 CM/M2
ECHO LA DIAMETER: 5.7 CM
ECHO LA VOL A-L A2C: 149 ML (ref 18–58)
ECHO LA VOL A-L A4C: 183 ML (ref 18–58)
ECHO LA VOL MOD A2C: 143 ML (ref 18–58)
ECHO LA VOL MOD A4C: 177 ML (ref 18–58)
ECHO LA VOLUME AREA LENGTH: 171 ML
ECHO LA VOLUME INDEX A-L A2C: 64 ML/M2 (ref 16–34)
ECHO LA VOLUME INDEX A-L A4C: 79 ML/M2 (ref 16–34)
ECHO LA VOLUME INDEX AREA LENGTH: 74 ML/M2 (ref 16–34)
ECHO LA VOLUME INDEX MOD A2C: 62 ML/M2 (ref 16–34)
ECHO LA VOLUME INDEX MOD A4C: 76 ML/M2 (ref 16–34)
ECHO LV EF PHYSICIAN: 60 %
ECHO LV FRACTIONAL SHORTENING: 28 % (ref 28–44)
ECHO LV INTERNAL DIMENSION DIASTOLE INDEX: 2.33 CM/M2
ECHO LV INTERNAL DIMENSION DIASTOLIC: 5.4 CM (ref 4.2–5.9)
ECHO LV INTERNAL DIMENSION SYSTOLIC INDEX: 1.68 CM/M2
ECHO LV INTERNAL DIMENSION SYSTOLIC: 3.9 CM
ECHO LV IVSD: 1.4 CM (ref 0.6–1)
ECHO LV IVSS: 1.8 CM
ECHO LV MASS 2D: 345.3 G (ref 88–224)
ECHO LV MASS INDEX 2D: 148.8 G/M2 (ref 49–115)
ECHO LV POSTERIOR WALL DIASTOLIC: 1.5 CM (ref 0.6–1)
ECHO LV POSTERIOR WALL SYSTOLIC: 1.7 CM
ECHO LV RELATIVE WALL THICKNESS RATIO: 0.56
ECHO LVOT AREA: 4.5 CM2
ECHO LVOT AV VTI INDEX: 0.51
ECHO LVOT DIAM: 2.4 CM
ECHO LVOT MEAN GRADIENT: 2 MMHG
ECHO LVOT PEAK GRADIENT: 4 MMHG
ECHO LVOT PEAK VELOCITY: 1 M/S
ECHO LVOT STROKE VOLUME INDEX: 49.3 ML/M2
ECHO LVOT SV: 114.4 ML
ECHO LVOT VTI: 25.3 CM
ECHO MV "A" WAVE DURATION: 198.4 MSEC
ECHO MV A VELOCITY: 1 M/S
ECHO MV AREA PHT: 2.7 CM2
ECHO MV AREA VTI: 2.8 CM2
ECHO MV E DECELERATION TIME (DT): 226 MS
ECHO MV E VELOCITY: 1.46 M/S
ECHO MV E/A RATIO: 1.46
ECHO MV LVOT VTI INDEX: 1.59
ECHO MV MAX VELOCITY: 1.6 M/S
ECHO MV MEAN GRADIENT: 4 MMHG
ECHO MV MEAN VELOCITY: 0.9 M/S
ECHO MV PEAK GRADIENT: 10 MMHG
ECHO MV PRESSURE HALF TIME (PHT): 80.3 MS
ECHO MV REGURGITANT VELOCITY PISA: 6.2 M/S
ECHO MV REGURGITANT VTIA: 244 CM
ECHO MV VTI: 40.3 CM
ECHO PULMONARY ARTERY END DIASTOLIC PRESSURE: 8 MMHG
ECHO PV MAX VELOCITY: 0.9 M/S
ECHO PV MEAN GRADIENT: 2 MMHG
ECHO PV MEAN VELOCITY: 0.6 M/S
ECHO PV PEAK GRADIENT: 3 MMHG
ECHO PV REGURGITANT MAX VELOCITY: 1.4 M/S
ECHO PV VTI: 20.6 CM
ECHO PVEIN PEAK D VELOCITY: 0.7 M/S
ECHO PVEIN PEAK S VELOCITY: 0.4 M/S
ECHO PVEIN S/D RATIO: 0.6
ECHO RIGHT VENTRICULAR SYSTOLIC PRESSURE (RVSP): 53 MMHG
ECHO RV INTERNAL DIMENSION: 3.9 CM
ECHO RV TAPSE: 1.9 CM (ref 1.7–?)
ECHO TV REGURGITANT MAX VELOCITY: 3.36 M/S
ECHO TV REGURGITANT PEAK GRADIENT: 45 MMHG

## 2024-09-04 PROCEDURE — 93306 TTE W/DOPPLER COMPLETE: CPT | Performed by: INTERNAL MEDICINE

## 2025-02-27 ENCOUNTER — TELEPHONE (OUTPATIENT)
Age: 89
End: 2025-02-27

## 2025-02-27 NOTE — TELEPHONE ENCOUNTER
Left voicemail on wife's cell phone reminding patient's appointment on 3/5/[facility].  Echocardiogram at 1000, visit to follow with Riri Mendoza.

## 2025-03-03 DIAGNOSIS — I34.0 NONRHEUMATIC MITRAL VALVE REGURGITATION: Primary | ICD-10-CM

## 2025-03-04 ENCOUNTER — TELEPHONE (OUTPATIENT)
Dept: CARDIOLOGY | Age: 89
End: 2025-03-04

## 2025-03-04 NOTE — TELEPHONE ENCOUNTER
No show echo.  I LM to reschedule.    Electronically signed by Belgica Wood on 3/4/2025 at 10:31 AM

## 2025-04-25 ENCOUNTER — OFFICE VISIT (OUTPATIENT)
Dept: URGENT CARE | Age: 89
End: 2025-04-25
Payer: MEDICARE

## 2025-04-25 VITALS
SYSTOLIC BLOOD PRESSURE: 125 MMHG | HEART RATE: 72 BPM | TEMPERATURE: 96.2 F | OXYGEN SATURATION: 92 % | DIASTOLIC BLOOD PRESSURE: 62 MMHG

## 2025-04-25 DIAGNOSIS — L08.9 INFECTED WOUND: Primary | ICD-10-CM

## 2025-04-25 DIAGNOSIS — T14.8XXA INFECTED WOUND: Primary | ICD-10-CM

## 2025-04-25 RX ORDER — SULFAMETHOXAZOLE AND TRIMETHOPRIM 800; 160 MG/1; MG/1
1 TABLET ORAL 2 TIMES DAILY
Qty: 10 TABLET | Refills: 0 | Status: SHIPPED | OUTPATIENT
Start: 2025-04-25 | End: 2025-04-30

## 2025-04-25 NOTE — PROGRESS NOTES
"Subjective   Patient ID: Medardo Hernandez is a 89 y.o. male. They present today with a chief complaint of Other (Pt stated, left leg wound, is \"leaking oil\" for the past three days. Wound is the size of silver dollar. Rt leg has pimples that popped up today).    History of Present Illness  89 yo male coming in for a wound to the left lower leg that is draining. He states he has noticed some red bumps showing up on his legs over the last day also. He denies any pain. He states his legs are swollen and drain a clear fluid. He states he is supposed to take lasix but he doesn't take it like he is supposed to.     Past Medical History  Allergies as of 04/25/2025    (No Known Allergies)       Prescriptions Prior to Admission[1]     Medical History[2]    Surgical History[3]         Review of Systems  Review of Systems:  General: No weight loss, fatigue, anorexia, insomnia, fever, chills.  Cardiac: No chest pain, palpitations, syncope, near syncope.  Pulmonary:  No shortness of breath, cough, hemoptysis  Heme/lymph: No swollen glands, fever, bleeding  Musculoskeletal: No limb pain, joint pain, joint swelling.  Skin: Positive wound to the left lower leg.  Neuro: No numbness, tingling, headaches                                 Objective    Vitals:    04/25/25 1852   BP: 125/62   BP Location: Left arm   Patient Position: Sitting   Pulse: 72   Temp: 35.7 °C (96.2 °F)   TempSrc: Oral   SpO2: 92%     No LMP for male patient.    Physical Exam  Physical Exam:  General: Vital noted, no distress. Afebrile  Cardiac: Regular rate and rhythm, no murmur  Pulmonary: Lungs clear bilaterally with good aeration. No adventitious breath sounds.  Musculoskeletal: Bilateral lower legs with edema noted.  Skin: Raised papules noted to the right lower leg with clear fluid draining from them. Open wound noted to the left lower leg with yellow eschar noted and yellow puss drainage. No tenderness on palpation is reported by the patient. "     Procedures    Point of Care Test & Imaging Results from this visit  No results found for this visit on 04/25/25.   Imaging  No results found.    Cardiology, Vascular, and Other Imaging  No other imaging results found for the past 2 days      Diagnostic study results (if any) were reviewed by JAYLAN Fung.    Assessment/Plan   Allergies, medications, history, and pertinent labs/EKGs/Imaging reviewed by JAYLAN Fung.     Medical Decision Making  Testing: Wound culture  Treatment: Bactrim DS prescribed. Referral to wound care given  Differential: 1) infected wound , 2) peripheral edema, 3) cellulitis  Plan: Patient will follow up with the PCP in the next 2-3 days. Return for any worsening symptoms or go to the ER for further evaluation. Patient understands return precautions and discharge insturctions.  Impression:   1) infected wound of left lower leg.      Orders and Diagnoses  Diagnoses and all orders for this visit:  Infected wound  -     QUEST CULTURE, AEROBIC AND ANAEROBIC W/GRAM STAIN  -     sulfamethoxazole-trimethoprim (Bactrim DS) 800-160 mg tablet; Take 1 tablet by mouth 2 times a day for 5 days.  -     sulfamethoxazole-trimethoprim (Bactrim DS) 800-160 mg tablet; Take 1 tablet by mouth 2 times a day for 5 days.  -     Referral to Wound Clinic; Future      Medical Admin Record      Patient disposition: Home    Electronically signed by JAYLAN Fung  7:25 PM           [1] (Not in a hospital admission)   [2]   Past Medical History:  Diagnosis Date    Encounter for adjustment and management of other part of cardiac pacemaker     Pacemaker reprogramming/check    Other conditions influencing health status     Hospital patient    Personal history of other medical treatment     History of EKG    Personal history of pulmonary embolism     Personal history of pulmonary embolism   [3]   Past Surgical History:  Procedure Laterality Date    AORTIC VALVE REPLACEMENT  09/25/2014     Aortic Valve Replacement    OTHER SURGICAL HISTORY  09/25/2014    Permanent Pacemaker Implantation Date    TOTAL KNEE ARTHROPLASTY  09/25/2014    Knee Replacement

## 2025-04-27 LAB
BACTERIA SPEC AEROBE CULT: ABNORMAL
BACTERIA SPEC ANAEROBE CULT: ABNORMAL

## 2025-05-01 ENCOUNTER — OFFICE VISIT (OUTPATIENT)
Dept: WOUND CARE | Facility: CLINIC | Age: 89
End: 2025-05-01
Payer: MEDICARE

## 2025-05-01 LAB
BACTERIA SPEC AEROBE CULT: ABNORMAL
BACTERIA SPEC ANAEROBE CULT: ABNORMAL

## 2025-05-01 PROCEDURE — 11042 DBRDMT SUBQ TIS 1ST 20SQCM/<: CPT

## 2025-05-02 ENCOUNTER — OFFICE VISIT (OUTPATIENT)
Dept: CARDIOLOGY CLINIC | Age: 89
End: 2025-05-02
Payer: MEDICARE

## 2025-05-02 ENCOUNTER — HOSPITAL ENCOUNTER (OUTPATIENT)
Dept: CARDIOLOGY | Age: 89
Discharge: HOME | End: 2025-05-02
Payer: MEDICARE

## 2025-05-02 VITALS
DIASTOLIC BLOOD PRESSURE: 54 MMHG | WEIGHT: 255 LBS | HEIGHT: 72 IN | SYSTOLIC BLOOD PRESSURE: 98 MMHG | BODY MASS INDEX: 34.54 KG/M2

## 2025-05-02 VITALS
SYSTOLIC BLOOD PRESSURE: 98 MMHG | WEIGHT: 255.4 LBS | TEMPERATURE: 97.3 F | HEART RATE: 63 BPM | RESPIRATION RATE: 18 BRPM | DIASTOLIC BLOOD PRESSURE: 54 MMHG | BODY MASS INDEX: 34.59 KG/M2 | HEIGHT: 72 IN

## 2025-05-02 DIAGNOSIS — I34.0 MITRAL VALVE INSUFFICIENCY, UNSPECIFIED ETIOLOGY: Primary | ICD-10-CM

## 2025-05-02 DIAGNOSIS — Z98.890 S/P MITRAL VALVE CLIP IMPLANTATION: ICD-10-CM

## 2025-05-02 DIAGNOSIS — I34.0 NONRHEUMATIC MITRAL VALVE REGURGITATION: ICD-10-CM

## 2025-05-02 DIAGNOSIS — Z95.818 S/P MITRAL VALVE CLIP IMPLANTATION: ICD-10-CM

## 2025-05-02 DIAGNOSIS — I95.9 HYPOTENSION, UNSPECIFIED HYPOTENSION TYPE: ICD-10-CM

## 2025-05-02 DIAGNOSIS — I50.32 CHRONIC HEART FAILURE WITH PRESERVED EJECTION FRACTION (HCC): ICD-10-CM

## 2025-05-02 LAB
ECHO BSA: 2.42 M2
ECHO EST RA PRESSURE: 8 MMHG
ECHO LA DIAMETER INDEX: 2.46 CM/M2
ECHO LA DIAMETER: 5.8 CM
ECHO LV EF PHYSICIAN: 43 %
ECHO LV EJECTION FRACTION A2C: 43 %
ECHO LV EJECTION FRACTION A4C: 44 %
ECHO LV FRACTIONAL SHORTENING: 22 % (ref 28–44)
ECHO LV INTERNAL DIMENSION DIASTOLE INDEX: 2.33 CM/M2
ECHO LV INTERNAL DIMENSION DIASTOLIC: 5.5 CM (ref 4.2–5.9)
ECHO LV INTERNAL DIMENSION SYSTOLIC INDEX: 1.82 CM/M2
ECHO LV INTERNAL DIMENSION SYSTOLIC: 4.3 CM
ECHO LV IVSD: 1.3 CM (ref 0.6–1)
ECHO LV IVSS: 1.8 CM
ECHO LV MASS 2D: 288.2 G (ref 88–224)
ECHO LV MASS INDEX 2D: 122.1 G/M2 (ref 49–115)
ECHO LV POSTERIOR WALL DIASTOLIC: 1.2 CM (ref 0.6–1)
ECHO LV POSTERIOR WALL SYSTOLIC: 1.3 CM
ECHO LV RELATIVE WALL THICKNESS RATIO: 0.44
ECHO MV AREA PHT: 3 CM2
ECHO MV E DECELERATION TIME (DT): 320 MS
ECHO MV MAX VELOCITY: 2 M/S
ECHO MV MEAN GRADIENT: 5 MMHG
ECHO MV MEAN VELOCITY: 1 M/S
ECHO MV PEAK GRADIENT: 15 MMHG
ECHO MV PRESSURE HALF TIME (PHT): 73.4 MS
ECHO MV REGURGITANT VELOCITY PISA: 4.1 M/S
ECHO MV REGURGITANT VTIA: 143.2 CM
ECHO MV VTI: 34 CM
ECHO RIGHT VENTRICULAR SYSTOLIC PRESSURE (RVSP): 49 MMHG
ECHO RV INTERNAL DIMENSION: 3.4 CM
ECHO TV REGURGITANT MAX VELOCITY: 3.19 M/S
ECHO TV REGURGITANT PEAK GRADIENT: 41 MMHG

## 2025-05-02 PROCEDURE — 93000 ELECTROCARDIOGRAM COMPLETE: CPT | Performed by: INTERNAL MEDICINE

## 2025-05-02 PROCEDURE — G2211 COMPLEX E/M VISIT ADD ON: HCPCS | Performed by: INTERNAL MEDICINE

## 2025-05-02 PROCEDURE — 93306 TTE W/DOPPLER COMPLETE: CPT | Performed by: INTERNAL MEDICINE

## 2025-05-02 PROCEDURE — 1123F ACP DISCUSS/DSCN MKR DOCD: CPT | Performed by: INTERNAL MEDICINE

## 2025-05-02 PROCEDURE — 1159F MED LIST DOCD IN RCRD: CPT | Performed by: INTERNAL MEDICINE

## 2025-05-02 PROCEDURE — 1160F RVW MEDS BY RX/DR IN RCRD: CPT | Performed by: INTERNAL MEDICINE

## 2025-05-02 PROCEDURE — 99214 OFFICE O/P EST MOD 30 MIN: CPT | Performed by: INTERNAL MEDICINE

## 2025-05-02 PROCEDURE — 93308 TTE F-UP OR LMTD: CPT

## 2025-05-02 RX ORDER — MIDODRINE HYDROCHLORIDE 2.5 MG/1
2.5 TABLET ORAL 2 TIMES DAILY
Qty: 60 TABLET | Refills: 3 | Status: SHIPPED | OUTPATIENT
Start: 2025-05-02

## 2025-05-02 RX ORDER — DOXYCYCLINE HYCLATE 100 MG/1
TABLET, DELAYED RELEASE ORAL
COMMUNITY
Start: 2025-04-29

## 2025-05-02 NOTE — PROGRESS NOTES
ms.   No evidence of aortic valve regurgitation.   Mild to moderate tricuspid regurgitation.   RVSP is 37 mmHg. Normal estimated PA systolic pressure.   No evidence for hemodynamically significant pericardial effusion.   Consider REZA for further evaluation of mitral regurgitation.         REZA-5/6/2021    Normal left ventricle size and systolic function.   Ejection fraction is visually estimated at 60-65%.   No regional wall motion abnormalities seen.   Normal left ventricle wall thickness.   Mildly dilated left atrium.   Normal right ventricular size and function.   Moderate mitral regurgitation with anteriorly directed jet. VC 0.6 cm, ERO   0.6 cm2, no PV flow reversal.   Normally functioning bioprosthetic valve in aortic position.   No hemodynamically significant aortic stenosis is present. TAYLOR by direct   planimetry is 2.5 cm2.   Mild tricuspid regurgitation.   RVSP is 26 mmHg. Normal estimated PA systolic pressure.   No evidence for hemodynamically significant pericardial effusion.    Limited TTE 7/2023: EF 60 to 65%.  NWMA.  Moderate concentric LVH.  Indeterminate diastolic function.  Normal RV size and function.  LA severely dilated.  Mildly enlarged RA.  Mild mitral valve calcification with mild-moderate MR.  Mild TR RVSP 44 mmHg pulmonary hypertension mild.  Individual AV leaflets not clearly visualized.    TTE 2/28/2024 [facility]: EF 50±5%.  Anterior wall and anterior lateral wall are mildly hypokinetic.  RV size and function normal.  LA severely dilated.  RA dilated.  Moderately severe 3+ MR due to prolapse.  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  Regurgitant orifice 0.24 cm².  MR may be underestimated due to eccentric regurgitant jet.  Bioprosthetic AVR with no regurg.  Peak gradient 15 mmHg, mean 9 mmHg, dimensionless valve index 0.38.    Lexiscan nuclear stress test-8/3/2021  Impression:    ECG during the infusion did not change.   The myocardial perfusion imaging was normal with attenuation

## 2025-05-02 NOTE — PATIENT INSTRUCTIONS
BP is low advised to start on midodrine 2.5 mg po twice a day. Take evening dose at least 2 hours before going to bed.  Monitor BP daily and call me on Monday.  Recent hospital records were reviewed, as above underwent successful [id number] repair of the mitral valve and posterior MitraClip he had only mild MR.  Continue Eliquis 5 mg p.o. twice daily for anticoagulation  Continue atorvastatin 20 mg p.o. daily  Continue dapagliflozin 10 mg p.o. daily.  Follow up with pcp for INR monitoring.   Follow-up with EP service for pacemaker evaluation.  Continue Protonix 40 mg p.o. daily  Continue furosemide 20 mg p.o. daily and potassium 20 mg p.o. daily.  Rest as per primary service   Follow up with dentist for dental pain.  Follow-up with me in 3 months

## 2025-05-05 ENCOUNTER — TELEPHONE (OUTPATIENT)
Dept: CARDIOLOGY CLINIC | Age: 89
End: 2025-05-05

## 2025-05-05 NOTE — TELEPHONE ENCOUNTER
Patient's daughter (Martha) notified of Dr. Garcia's assessment/recommendations.  Med list amended.

## 2025-05-05 NOTE — TELEPHONE ENCOUNTER
Now, he is blood pressure is elevated.  Please ask him to hold midodrine, stay well-hydrated, monitor blood pressure daily and call us in 1 week.

## 2025-05-05 NOTE — TELEPHONE ENCOUNTER
Patient's daughter (Martha) called with BP readings since adding Midodrine 2.5 mg BID on 5/2/25.    146/78 Saturday  140/80 Sunday

## 2025-05-08 ENCOUNTER — OFFICE VISIT (OUTPATIENT)
Dept: WOUND CARE | Facility: CLINIC | Age: 89
End: 2025-05-08
Payer: MEDICARE

## 2025-05-08 PROCEDURE — 11042 DBRDMT SUBQ TIS 1ST 20SQCM/<: CPT

## 2025-05-15 ENCOUNTER — OFFICE VISIT (OUTPATIENT)
Dept: WOUND CARE | Facility: CLINIC | Age: 89
End: 2025-05-15
Payer: MEDICARE

## 2025-05-15 PROCEDURE — 11042 DBRDMT SUBQ TIS 1ST 20SQCM/<: CPT

## 2025-05-22 ENCOUNTER — OFFICE VISIT (OUTPATIENT)
Dept: WOUND CARE | Facility: CLINIC | Age: 89
End: 2025-05-22
Payer: MEDICARE

## 2025-05-22 PROCEDURE — 11042 DBRDMT SUBQ TIS 1ST 20SQCM/<: CPT

## 2025-05-27 ENCOUNTER — TELEPHONE (OUTPATIENT)
Dept: CARDIOLOGY CLINIC | Age: 89
End: 2025-05-27

## 2025-05-28 ENCOUNTER — TELEPHONE (OUTPATIENT)
Dept: CARDIOLOGY CLINIC | Age: 89
End: 2025-05-28

## 2025-05-28 NOTE — TELEPHONE ENCOUNTER
Patient scheduled for fillings, crowns and SRP by [facility].  Patient on Eliquis.    1)  Okay to proceed?  2)  Does patient need antibiotic prophylaxis?  2)  Contraindications to local anesthetic w/epinephrine?  3)  Any need to withhold or alter any medications prior to treatment?

## 2025-05-28 NOTE — TELEPHONE ENCOUNTER
Yes, he needs antibiotic prophylaxis prior to dental procedure, no contraindications to local anesthetic with epinephrine.  Patient can continue Eliquis if no contraindications from the dental procedure.

## 2025-05-29 ENCOUNTER — APPOINTMENT (OUTPATIENT)
Dept: WOUND CARE | Facility: CLINIC | Age: 89
End: 2025-05-29
Payer: MEDICARE

## 2025-06-05 ENCOUNTER — OFFICE VISIT (OUTPATIENT)
Dept: WOUND CARE | Facility: CLINIC | Age: 89
End: 2025-06-05
Payer: MEDICARE

## 2025-06-05 PROCEDURE — 11042 DBRDMT SUBQ TIS 1ST 20SQCM/<: CPT

## 2025-06-12 ENCOUNTER — APPOINTMENT (OUTPATIENT)
Dept: WOUND CARE | Facility: CLINIC | Age: 89
End: 2025-06-12
Payer: MEDICARE

## 2025-06-16 ENCOUNTER — OFFICE VISIT (OUTPATIENT)
Dept: WOUND CARE | Facility: CLINIC | Age: 89
End: 2025-06-16
Payer: MEDICARE

## 2025-06-16 DIAGNOSIS — S91.104A UNSPECIFIED OPEN WOUND OF RIGHT LESSER TOE(S) WITHOUT DAMAGE TO NAIL, INITIAL ENCOUNTER: Primary | ICD-10-CM

## 2025-06-16 PROCEDURE — 10061 I&D ABSCESS COMP/MULTIPLE: CPT

## 2025-06-16 RX ORDER — DOXYCYCLINE 100 MG/1
100 CAPSULE ORAL 2 TIMES DAILY
Qty: 14 CAPSULE | Refills: 0 | Status: SHIPPED | OUTPATIENT
Start: 2025-06-16 | End: 2025-06-23

## 2025-06-19 ENCOUNTER — APPOINTMENT (OUTPATIENT)
Dept: WOUND CARE | Facility: CLINIC | Age: 89
End: 2025-06-19
Payer: MEDICARE

## 2025-06-22 LAB
BACTERIA SPEC AEROBE CULT: NORMAL
BACTERIA SPEC ANAEROBE CULT: NORMAL

## 2025-06-26 ENCOUNTER — OFFICE VISIT (OUTPATIENT)
Dept: WOUND CARE | Facility: CLINIC | Age: 89
End: 2025-06-26
Payer: MEDICARE

## 2025-06-26 PROCEDURE — 11042 DBRDMT SUBQ TIS 1ST 20SQCM/<: CPT

## 2025-07-03 ENCOUNTER — APPOINTMENT (OUTPATIENT)
Dept: WOUND CARE | Facility: CLINIC | Age: 89
End: 2025-07-03
Payer: MEDICARE

## 2025-07-10 ENCOUNTER — APPOINTMENT (OUTPATIENT)
Dept: WOUND CARE | Facility: CLINIC | Age: 89
End: 2025-07-10
Payer: MEDICARE

## 2025-08-15 ENCOUNTER — TELEPHONE (OUTPATIENT)
Dept: ADMINISTRATIVE | Age: 89
End: 2025-08-15

## (undated) DEVICE — ANGIOPLASTY ADD-ON PACK: Brand: MEDLINE INDUSTRIES, INC.

## (undated) DEVICE — GUIDEWIRE VASCULAR L145CM 0.035IN J TIP L3MM PTFE FIX COR NAMIC

## (undated) DEVICE — 3M™ IOBAN™ 2 ANTIMICROBIAL INCISE DRAPE 6650EZ: Brand: IOBAN™ 2

## (undated) DEVICE — BLADE,STAINLESS-STEEL,15,STRL,DISPOSABLE: Brand: MEDLINE

## (undated) DEVICE — BLADE,STAINLESS-STEEL,10,STRL,DISPOSABLE: Brand: MEDLINE

## (undated) DEVICE — BAND COMPR L24CM REG CLR PLAS HEMSTAT EXT HK AND LOOP RETEN

## (undated) DEVICE — SYRINGE MED 50ML LUERLOCK TIP

## (undated) DEVICE — GLOVE ORANGE PI 7   MSG9070

## (undated) DEVICE — DRAPE SHEET: Brand: UNBRANDED

## (undated) DEVICE — COVER PRB W14XL147CM TELESCOPICALLY FLD EXT LEN CIV-FLEX

## (undated) DEVICE — WIPES SKIN CLOTH READYPREP 9 X 10.5 IN 2% CHLORHEX GLUCONATE CHG PREOP

## (undated) DEVICE — GOWN,SIRUS,FABRNF,XL,20/CS: Brand: MEDLINE

## (undated) DEVICE — GUIDEWIRE VASC L260CM 0.035IN J TIP L3MM PTFE FIX COR NAMIC

## (undated) DEVICE — GLOVE SURG SZ 7.5 L11.73IN FNGR THK9.8MIL STRW LTX POLYMER

## (undated) DEVICE — ELECTRODE ES AD PED L2.5IN TEF INSUL MOD NONCORDED BLDE TIP

## (undated) DEVICE — ANGIOGRAPHIC CATHETER: Brand: EXPO™

## (undated) DEVICE — OPTIFOAM GENTLE EX, SACRUM, 9X9: Brand: MEDLINE

## (undated) DEVICE — ELECTRODE PT RET AD L9FT HI MOIST COND ADH HYDRGEL CORDED

## (undated) DEVICE — COVER,LIGHT HANDLE,FLX,2/PK: Brand: MEDLINE INDUSTRIES, INC.

## (undated) DEVICE — DRAPE,REIN 53X77,STERILE: Brand: MEDLINE

## (undated) DEVICE — SOLUTION IV 1000ML 0.9% SOD CHL PH 5 INJ USP VIAFLX PLAS

## (undated) DEVICE — PERCLOSE™ PROSTYLE™ SUTURE-MEDIATED CLOSURE AND REPAIR SYSTEM: Brand: PERCLOSE™ PROSTYLE™

## (undated) DEVICE — GLIDESHEATH SLENDER ACCESS KIT: Brand: GLIDESHEATH SLENDER

## (undated) DEVICE — SOLUTION IV 1000ML 140MEQ/L SOD 5MEQ/L K 3MEQ/L MG 27MEQ/L

## (undated) DEVICE — Device

## (undated) DEVICE — AMPLATZ EXTRA STIFF WIRE GUIDE: Brand: AMPLATZ

## (undated) DEVICE — TAPE ADH W2INXL10YD PLAS TRNSPAR H2O RESIST HYPOALRG CURAD

## (undated) DEVICE — SC 3W HP RA OFF NB - PG: Brand: NAMIC

## (undated) DEVICE — TOWEL,OR,DSP,ST,BLUE,STD,6/PK,12PK/CS: Brand: MEDLINE

## (undated) DEVICE — INTRODUCER SHTH 6FR L12CM DIA0.038IN HEMSTAS CLOSE TOL

## (undated) DEVICE — NDLHOLDER F/P WEBSTER FN8064 S: Brand: CENTURION MEDICAL PRODUCTS CORP

## (undated) DEVICE — KIT MFLD ISOLATN NACL CNTRST PRT TBNG SPIK W/ PRSS TRNSDUC

## (undated) DEVICE — TOWEL,OR,DSP,ST,WHITE,DLX,4/PK,20PK/CS: Brand: MEDLINE

## (undated) DEVICE — 1 X VERSACROSS TRANSSEPTAL SHEATH (INCLUDING  1 X J-TIP GUIDEWIRE); 1 X VERSACROSS RF WIRE (INCLUDING 1 X CONNECTOR CABLE (SINGLE USE)); 1 X DISPERSIVE ELECTRODE: Brand: VERSACROSS ACCESS SOLUTION

## (undated) DEVICE — KIT MED IMAG CNTRST AGNT W/ IOPAMIDOL REUSE

## (undated) DEVICE — KIT ANGIO W/ AT P65 PREM HND CTRL FOR CNTRST DEL ANGIOTOUCH

## (undated) DEVICE — SHORT WHOLEY WIRE 145CM

## (undated) DEVICE — GAUZE,SPONGE,4"X4",8PLY,STRL,LF,10/TRAY: Brand: MEDLINE

## (undated) DEVICE — SET INTRO SHTH 5FR L45CM GRY INTEGR SIDE PRT HAEMOSTASIS

## (undated) DEVICE — KIT HND CTRL 3 W STPCOCK ROT END 54IN PREM HI PRSS TBNG AT

## (undated) DEVICE — BASIC: Brand: MEDLINE INDUSTRIES, INC.

## (undated) DEVICE — GLIDESHEATH NITINOL HYDROPHILIC COATED INTRODUCER SHEATH: Brand: GLIDESHEATH

## (undated) DEVICE — COVER,TABLE,60X90,STERILE: Brand: MEDLINE

## (undated) DEVICE — CATHETER DIAG 5FR L110CM ID0.045IN VENT VASC PGTL 145 EXPO

## (undated) DEVICE — SHEATH INTRO 6FR L11CM 0.038IN SIL TRICSP VLV W/ GWIRE

## (undated) DEVICE — GLOVE SURG 7 PF POLYMER COAT WHT STRL SIGN LTX ESSENTIAL LTX

## (undated) DEVICE — BOWL MED L 32OZ PLAS W/ MOLD GRAD EZ OPN PEEL PCH

## (undated) DEVICE — GLOVE ORANGE PI 7 1/2   MSG9075

## (undated) DEVICE — GUIDEWIRE VASC L260CM DIA0.035IN TAPR L11CM FLPY TIP L4CM

## (undated) DEVICE — STRAP POS MP 30X3 IN HK LOOP CLOSURE FOAM DISP

## (undated) DEVICE — BLADE,STAINLESS-STEEL,20,STRL,DISPOSABLE: Brand: MEDLINE

## (undated) DEVICE — PAD, DEFIB, ADULT, RADIOTRAN, PHYSIO, LO: Brand: MEDLINE

## (undated) DEVICE — CANNULA NSL CANN NSL L25FT TBNG AD O2 SUP SFT UC

## (undated) DEVICE — MEDTRONIC JR4.0 DIAGNOSTIC CATHETER

## (undated) DEVICE — SET TRNQT W/ STD 7IN 12FR 5 TB 1 SNR DLP

## (undated) DEVICE — PACK SURG CARDIAC CATH

## (undated) DEVICE — CATHETER ANGIO 5FR L100CM ID0.045IN AL1 CRV ROBUST SHFT

## (undated) DEVICE — GOWN,SIRUS,FABRNF,L,20/CS: Brand: MEDLINE

## (undated) DEVICE — SHEATH INTRO 5FR L11CM 0.038IN SIL TRICSP VLV W/ GWIRE

## (undated) DEVICE — CATHETER PACE 5FR L110CM 6FR INTRO D10CM 1MM SPACE POLYUR

## (undated) DEVICE — PERCUTANEOUS ENTRY THINWALL NEEDLE  ONE-PART: Brand: COOK

## (undated) DEVICE — MEDIA CONTRAST ISOVUE 300 100ML

## (undated) DEVICE — GLOVE SURG SZ 65 THK91MIL LTX FREE SYN POLYISOPRENE

## (undated) DEVICE — BLADE,STAINLESS-STEEL,11,STRL,DISPOSABLE: Brand: MEDLINE

## (undated) DEVICE — TUBING PRSS MON L12IN PVC RIG NONEXPANDING M TO FEM CONN

## (undated) DEVICE — DRESSING TRNSPAR W4XL55IN ACRYL SUP FLM W ADH WTRPRF OPSITE

## (undated) DEVICE — BLADE CLIPPER GEN PURP NS

## (undated) DEVICE — LOOP VES W25MM THK1MM MAXI RED SIL FLD REPELLENT 100 PER

## (undated) DEVICE — CATHETER 5FR CORDIS MPA 2 100CM

## (undated) DEVICE — RADIFOCUS GLIDEWIRE: Brand: GLIDEWIRE

## (undated) DEVICE — DRAPE EQUIP BANDED BG 36X28 IN W/ROUNDED CORNER SNAPKOVER

## (undated) DEVICE — PICO 7 10CM X 30CM: Brand: PICO™ 7

## (undated) DEVICE — NEEDLE SPNL 20GA L3.5IN YEL HUB S STL REG WALL FIT STYL

## (undated) DEVICE — DOUBLE BASIN SET: Brand: MEDLINE INDUSTRIES, INC.

## (undated) DEVICE — SYRINGE MED 10ML LUERLOCK TIP W/O SFTY DISP

## (undated) DEVICE — MEDI-TRACE CADENCE ADULT, PRE-CONNECT  DEFIBRILLATION ELECTRODE (10 PR/PK) - PHYSIO-CONTROL: Brand: MEDI-TRACE CADENCE

## (undated) DEVICE — LABEL MED CARD SURG 4 IN PANEL STRL

## (undated) DEVICE — APPLICATOR MEDICATED 26 CC SOLUTION HI LT ORNG CHLORAPREP

## (undated) DEVICE — SYRINGE MED 20ML STD CLR PLAS LUERLOCK TIP N CTRL DISP

## (undated) DEVICE — LARGE BORE STOPCOCK WITH ROTATING MALE LUER LOCK

## (undated) DEVICE — SWAN-GANZ TRUE SIZE THERMODULTION CATHETER, 5F: Brand: SWAN-GANZ TRUE SIZE

## (undated) DEVICE — GUIDEWIRE ANGIO L150CM OD0.035IN STR FIX PTFE SLD SUPP

## (undated) DEVICE — GUIDEWIRE VASC L260CM DIA0.035IN BRECKER FOR COREVLV

## (undated) DEVICE — DRAPE, MULTI FENESTRATED, HYBRID OR, STE: Brand: MEDLINE